# Patient Record
Sex: FEMALE | Race: WHITE | Employment: OTHER | URBAN - METROPOLITAN AREA
[De-identification: names, ages, dates, MRNs, and addresses within clinical notes are randomized per-mention and may not be internally consistent; named-entity substitution may affect disease eponyms.]

---

## 2018-06-21 ENCOUNTER — HOSPITAL ENCOUNTER (INPATIENT)
Age: 83
LOS: 3 days | Discharge: NURSING, REHAB OR LONG-TERM CARE | DRG: 556 | End: 2018-06-26
Attending: EMERGENCY MEDICINE | Admitting: HOSPITALIST
Payer: MEDICARE

## 2018-06-21 ENCOUNTER — APPOINTMENT (OUTPATIENT)
Dept: CT IMAGING | Age: 83
DRG: 556 | End: 2018-06-21
Attending: EMERGENCY MEDICINE
Payer: MEDICARE

## 2018-06-21 ENCOUNTER — APPOINTMENT (OUTPATIENT)
Dept: GENERAL RADIOLOGY | Age: 83
DRG: 556 | End: 2018-06-21
Attending: FAMILY MEDICINE
Payer: MEDICARE

## 2018-06-21 ENCOUNTER — APPOINTMENT (OUTPATIENT)
Dept: GENERAL RADIOLOGY | Age: 83
DRG: 556 | End: 2018-06-21
Attending: EMERGENCY MEDICINE
Payer: MEDICARE

## 2018-06-21 DIAGNOSIS — T84.9XXA COMPLICATION OF INTERNAL LEFT HIP PROSTHESIS, UNSPECIFIED COMPLICATION, INITIAL ENCOUNTER (HCC): Primary | ICD-10-CM
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
## 2018-06-21 DIAGNOSIS — Z96.642 COMPLICATION OF INTERNAL LEFT HIP PROSTHESIS, UNSPECIFIED COMPLICATION, INITIAL ENCOUNTER (HCC): Primary | ICD-10-CM

## 2018-06-21 DIAGNOSIS — R42 DIZZINESS: ICD-10-CM

## 2018-06-21 LAB
ALBUMIN SERPL-MCNC: 3.7 G/DL (ref 3.5–5)
ALBUMIN/GLOB SERPL: 1.1 {RATIO} (ref 1.1–2.2)
ALP SERPL-CCNC: 71 U/L (ref 45–117)
ALT SERPL-CCNC: 27 U/L (ref 12–78)
ANION GAP SERPL CALC-SCNC: 8 MMOL/L (ref 5–15)
APPEARANCE UR: ABNORMAL
AST SERPL-CCNC: 18 U/L (ref 15–37)
ATRIAL RATE: 77 BPM
BACTERIA URNS QL MICRO: ABNORMAL /HPF
BASOPHILS # BLD: 0.1 K/UL (ref 0–0.1)
BASOPHILS NFR BLD: 1 % (ref 0–1)
BILIRUB SERPL-MCNC: 0.3 MG/DL (ref 0.2–1)
BILIRUB UR QL: NEGATIVE
BNP SERPL-MCNC: 594 PG/ML (ref 0–450)
BUN SERPL-MCNC: 28 MG/DL (ref 6–20)
BUN/CREAT SERPL: 35 (ref 12–20)
CALCIUM SERPL-MCNC: 8.9 MG/DL (ref 8.5–10.1)
CALCULATED P AXIS, ECG09: 69 DEGREES
CALCULATED R AXIS, ECG10: 22 DEGREES
CALCULATED T AXIS, ECG11: 85 DEGREES
CHLORIDE SERPL-SCNC: 105 MMOL/L (ref 97–108)
CK MB CFR SERPL CALC: 2.9 % (ref 0–2.5)
CK MB SERPL-MCNC: 2.4 NG/ML (ref 5–25)
CK SERPL-CCNC: 83 U/L (ref 26–192)
CO2 SERPL-SCNC: 28 MMOL/L (ref 21–32)
COLOR UR: ABNORMAL
CREAT SERPL-MCNC: 0.79 MG/DL (ref 0.55–1.02)
DIAGNOSIS, 93000: NORMAL
DIFFERENTIAL METHOD BLD: ABNORMAL
EOSINOPHIL # BLD: 0.2 K/UL (ref 0–0.4)
EOSINOPHIL NFR BLD: 3 % (ref 0–7)
EPITH CASTS URNS QL MICRO: ABNORMAL /LPF
ERYTHROCYTE [DISTWIDTH] IN BLOOD BY AUTOMATED COUNT: 13.6 % (ref 11.5–14.5)
GLOBULIN SER CALC-MCNC: 3.4 G/DL (ref 2–4)
GLUCOSE SERPL-MCNC: 95 MG/DL (ref 65–100)
GLUCOSE UR STRIP.AUTO-MCNC: NEGATIVE MG/DL
HCT VFR BLD AUTO: 41.9 % (ref 35–47)
HGB BLD-MCNC: 13.4 G/DL (ref 11.5–16)
HGB UR QL STRIP: NEGATIVE
IMM GRANULOCYTES # BLD: 0 K/UL (ref 0–0.04)
IMM GRANULOCYTES NFR BLD AUTO: 1 % (ref 0–0.5)
INR PPP: 1 (ref 0.9–1.1)
KETONES UR QL STRIP.AUTO: NEGATIVE MG/DL
LACTATE SERPL-SCNC: 0.7 MMOL/L (ref 0.4–2)
LEUKOCYTE ESTERASE UR QL STRIP.AUTO: ABNORMAL
LYMPHOCYTES # BLD: 2.3 K/UL (ref 0.8–3.5)
LYMPHOCYTES NFR BLD: 31 % (ref 12–49)
MCH RBC QN AUTO: 31.2 PG (ref 26–34)
MCHC RBC AUTO-ENTMCNC: 32 G/DL (ref 30–36.5)
MCV RBC AUTO: 97.7 FL (ref 80–99)
MONOCYTES # BLD: 0.8 K/UL (ref 0–1)
MONOCYTES NFR BLD: 11 % (ref 5–13)
NEUTS SEG # BLD: 4 K/UL (ref 1.8–8)
NEUTS SEG NFR BLD: 54 % (ref 32–75)
NITRITE UR QL STRIP.AUTO: POSITIVE
NRBC # BLD: 0 K/UL (ref 0–0.01)
NRBC BLD-RTO: 0 PER 100 WBC
P-R INTERVAL, ECG05: 142 MS
PH UR STRIP: 8 [PH] (ref 5–8)
PLATELET # BLD AUTO: 283 K/UL (ref 150–400)
PMV BLD AUTO: 8.8 FL (ref 8.9–12.9)
POTASSIUM SERPL-SCNC: 3.9 MMOL/L (ref 3.5–5.1)
PROT SERPL-MCNC: 7.1 G/DL (ref 6.4–8.2)
PROT UR STRIP-MCNC: NEGATIVE MG/DL
PROTHROMBIN TIME: 10.1 SEC (ref 9–11.1)
Q-T INTERVAL, ECG07: 398 MS
QRS DURATION, ECG06: 68 MS
QTC CALCULATION (BEZET), ECG08: 450 MS
RBC # BLD AUTO: 4.29 M/UL (ref 3.8–5.2)
RBC #/AREA URNS HPF: ABNORMAL /HPF (ref 0–5)
SODIUM SERPL-SCNC: 141 MMOL/L (ref 136–145)
SP GR UR REFRACTOMETRY: 1.01 (ref 1–1.03)
TROPONIN I SERPL-MCNC: <0.05 NG/ML
UR CULT HOLD, URHOLD: NORMAL
UROBILINOGEN UR QL STRIP.AUTO: 0.2 EU/DL (ref 0.2–1)
VENTRICULAR RATE, ECG03: 77 BPM
WBC # BLD AUTO: 7.4 K/UL (ref 3.6–11)
WBC URNS QL MICRO: ABNORMAL /HPF (ref 0–4)

## 2018-06-21 PROCEDURE — 87186 SC STD MICRODIL/AGAR DIL: CPT | Performed by: FAMILY MEDICINE

## 2018-06-21 PROCEDURE — 84484 ASSAY OF TROPONIN QUANT: CPT | Performed by: EMERGENCY MEDICINE

## 2018-06-21 PROCEDURE — 73700 CT LOWER EXTREMITY W/O DYE: CPT

## 2018-06-21 PROCEDURE — 80053 COMPREHEN METABOLIC PANEL: CPT | Performed by: EMERGENCY MEDICINE

## 2018-06-21 PROCEDURE — 74011250637 HC RX REV CODE- 250/637: Performed by: FAMILY MEDICINE

## 2018-06-21 PROCEDURE — 87077 CULTURE AEROBIC IDENTIFY: CPT | Performed by: FAMILY MEDICINE

## 2018-06-21 PROCEDURE — 82550 ASSAY OF CK (CPK): CPT | Performed by: EMERGENCY MEDICINE

## 2018-06-21 PROCEDURE — 99285 EMERGENCY DEPT VISIT HI MDM: CPT

## 2018-06-21 PROCEDURE — 87040 BLOOD CULTURE FOR BACTERIA: CPT | Performed by: EMERGENCY MEDICINE

## 2018-06-21 PROCEDURE — 70450 CT HEAD/BRAIN W/O DYE: CPT

## 2018-06-21 PROCEDURE — 83880 ASSAY OF NATRIURETIC PEPTIDE: CPT | Performed by: EMERGENCY MEDICINE

## 2018-06-21 PROCEDURE — 73590 X-RAY EXAM OF LOWER LEG: CPT

## 2018-06-21 PROCEDURE — 81001 URINALYSIS AUTO W/SCOPE: CPT | Performed by: EMERGENCY MEDICINE

## 2018-06-21 PROCEDURE — 99218 HC RM OBSERVATION: CPT

## 2018-06-21 PROCEDURE — 96374 THER/PROPH/DIAG INJ IV PUSH: CPT

## 2018-06-21 PROCEDURE — 36415 COLL VENOUS BLD VENIPUNCTURE: CPT | Performed by: EMERGENCY MEDICINE

## 2018-06-21 PROCEDURE — 74011250636 HC RX REV CODE- 250/636: Performed by: EMERGENCY MEDICINE

## 2018-06-21 PROCEDURE — 72125 CT NECK SPINE W/O DYE: CPT

## 2018-06-21 PROCEDURE — 85025 COMPLETE CBC W/AUTO DIFF WBC: CPT | Performed by: EMERGENCY MEDICINE

## 2018-06-21 PROCEDURE — 71045 X-RAY EXAM CHEST 1 VIEW: CPT

## 2018-06-21 PROCEDURE — 85610 PROTHROMBIN TIME: CPT | Performed by: EMERGENCY MEDICINE

## 2018-06-21 PROCEDURE — 94762 N-INVAS EAR/PLS OXIMTRY CONT: CPT

## 2018-06-21 PROCEDURE — 93005 ELECTROCARDIOGRAM TRACING: CPT

## 2018-06-21 PROCEDURE — 87086 URINE CULTURE/COLONY COUNT: CPT | Performed by: FAMILY MEDICINE

## 2018-06-21 PROCEDURE — 83605 ASSAY OF LACTIC ACID: CPT | Performed by: EMERGENCY MEDICINE

## 2018-06-21 PROCEDURE — 74011250636 HC RX REV CODE- 250/636: Performed by: FAMILY MEDICINE

## 2018-06-21 PROCEDURE — 74176 CT ABD & PELVIS W/O CONTRAST: CPT

## 2018-06-21 PROCEDURE — 73502 X-RAY EXAM HIP UNI 2-3 VIEWS: CPT

## 2018-06-21 RX ORDER — SODIUM CHLORIDE 9 MG/ML
75 INJECTION, SOLUTION INTRAVENOUS CONTINUOUS
Status: DISCONTINUED | OUTPATIENT
Start: 2018-06-21 | End: 2018-06-26 | Stop reason: HOSPADM

## 2018-06-21 RX ORDER — ESTRADIOL 0.1 MG/G
2 CREAM VAGINAL
Status: ON HOLD | COMMUNITY
End: 2022-03-09

## 2018-06-21 RX ORDER — ACETAMINOPHEN 500 MG
500 TABLET ORAL 2 TIMES DAILY
Status: ON HOLD | COMMUNITY
End: 2022-03-09

## 2018-06-21 RX ORDER — ACETAMINOPHEN 325 MG/1
650 TABLET ORAL
Status: ON HOLD | COMMUNITY
End: 2022-03-12 | Stop reason: SDUPTHER

## 2018-06-21 RX ORDER — SODIUM CHLORIDE 0.9 % (FLUSH) 0.9 %
5-10 SYRINGE (ML) INJECTION EVERY 8 HOURS
Status: DISCONTINUED | OUTPATIENT
Start: 2018-06-21 | End: 2018-06-26 | Stop reason: HOSPADM

## 2018-06-21 RX ORDER — ASPIRIN 81 MG/1
81 TABLET ORAL DAILY
Status: DISCONTINUED | OUTPATIENT
Start: 2018-06-22 | End: 2018-06-26 | Stop reason: HOSPADM

## 2018-06-21 RX ORDER — HYDROCORTISONE 1 %
CREAM (GRAM) TOPICAL
Status: ON HOLD | COMMUNITY
End: 2022-03-09

## 2018-06-21 RX ORDER — MENTHOL AND ZINC OXIDE .44; 20.625 G/100G; G/100G
OINTMENT TOPICAL 3 TIMES DAILY
COMMUNITY

## 2018-06-21 RX ORDER — ASPIRIN 81 MG/1
81 TABLET ORAL DAILY
COMMUNITY
End: 2021-04-08

## 2018-06-21 RX ORDER — ONDANSETRON 2 MG/ML
4 INJECTION INTRAMUSCULAR; INTRAVENOUS
Status: DISCONTINUED | OUTPATIENT
Start: 2018-06-21 | End: 2018-06-26 | Stop reason: HOSPADM

## 2018-06-21 RX ORDER — LACTULOSE 10 G/15ML
10 SOLUTION ORAL; RECTAL
Status: DISCONTINUED | OUTPATIENT
Start: 2018-06-21 | End: 2018-06-26 | Stop reason: HOSPADM

## 2018-06-21 RX ORDER — FENTANYL CITRATE 50 UG/ML
25 INJECTION, SOLUTION INTRAMUSCULAR; INTRAVENOUS
Status: DISCONTINUED | OUTPATIENT
Start: 2018-06-21 | End: 2018-06-26 | Stop reason: HOSPADM

## 2018-06-21 RX ORDER — FUROSEMIDE 20 MG/1
20 TABLET ORAL DAILY
COMMUNITY
End: 2018-06-26

## 2018-06-21 RX ORDER — SODIUM CHLORIDE 0.9 % (FLUSH) 0.9 %
5-10 SYRINGE (ML) INJECTION AS NEEDED
Status: DISCONTINUED | OUTPATIENT
Start: 2018-06-21 | End: 2018-06-26 | Stop reason: HOSPADM

## 2018-06-21 RX ORDER — FENTANYL CITRATE 50 UG/ML
50 INJECTION, SOLUTION INTRAMUSCULAR; INTRAVENOUS
Status: COMPLETED | OUTPATIENT
Start: 2018-06-21 | End: 2018-06-21

## 2018-06-21 RX ORDER — SERTRALINE HYDROCHLORIDE 50 MG/1
50 TABLET, FILM COATED ORAL DAILY
COMMUNITY

## 2018-06-21 RX ORDER — POLYETHYLENE GLYCOL 3350 17 G/17G
17 POWDER, FOR SOLUTION ORAL
Status: ON HOLD | COMMUNITY
End: 2022-03-09

## 2018-06-21 RX ORDER — SERTRALINE HYDROCHLORIDE 50 MG/1
50 TABLET, FILM COATED ORAL DAILY
Status: DISCONTINUED | OUTPATIENT
Start: 2018-06-22 | End: 2018-06-26 | Stop reason: HOSPADM

## 2018-06-21 RX ORDER — ACETAMINOPHEN 325 MG/1
650 TABLET ORAL
Status: DISCONTINUED | OUTPATIENT
Start: 2018-06-21 | End: 2018-06-26 | Stop reason: HOSPADM

## 2018-06-21 RX ORDER — LACTULOSE 10 G/15ML
10 SOLUTION ORAL; RECTAL
COMMUNITY

## 2018-06-21 RX ORDER — FENTANYL CITRATE 50 UG/ML
10 INJECTION, SOLUTION INTRAMUSCULAR; INTRAVENOUS ONCE
Status: COMPLETED | OUTPATIENT
Start: 2018-06-21 | End: 2018-06-21

## 2018-06-21 RX ORDER — POLYVINYL ALCOHOL 14 MG/ML
1 SOLUTION/ DROPS OPHTHALMIC
COMMUNITY

## 2018-06-21 RX ORDER — MELATONIN
1000 DAILY
COMMUNITY

## 2018-06-21 RX ORDER — POLYVINYL ALCOHOL 14 MG/ML
1 SOLUTION/ DROPS OPHTHALMIC
Status: DISCONTINUED | OUTPATIENT
Start: 2018-06-21 | End: 2018-06-26 | Stop reason: HOSPADM

## 2018-06-21 RX ADMIN — Medication 10 ML: at 17:24

## 2018-06-21 RX ADMIN — SODIUM CHLORIDE 75 ML/HR: 900 INJECTION, SOLUTION INTRAVENOUS at 14:57

## 2018-06-21 RX ADMIN — FENTANYL CITRATE 50 MCG: 50 INJECTION, SOLUTION INTRAMUSCULAR; INTRAVENOUS at 12:57

## 2018-06-21 RX ADMIN — FENTANYL CITRATE 25 MCG: 50 INJECTION, SOLUTION INTRAMUSCULAR; INTRAVENOUS at 19:08

## 2018-06-21 RX ADMIN — ACETAMINOPHEN 650 MG: 325 TABLET ORAL at 17:23

## 2018-06-21 RX ADMIN — FENTANYL CITRATE 25 MCG: 50 INJECTION, SOLUTION INTRAMUSCULAR; INTRAVENOUS at 23:41

## 2018-06-21 RX ADMIN — FENTANYL CITRATE 10 MCG: 50 INJECTION, SOLUTION INTRAMUSCULAR; INTRAVENOUS at 22:14

## 2018-06-21 NOTE — ED PROVIDER NOTES
HPI Comments: 80 y.o. female with past medical history significant for macular degeneration who presents from [facility] via EMS for evaluation s/p GLF. Pt states that she was standing, not holding onto her walker, when she became dizzy and fell. She states that her walker hit her forehead at that time and she was unable to sit up off the floor. She is now c/o L-sided lower back and hip pain as well as bilateral leg pain. Pt denies neck pain or HA. There are no other acute medical concerns at this time. Social hx: no tobacco use; no EtOH use    Note written by Alessandra Hargrove, as dictated by Chano Grover MD 10:14 AM    The history is provided by the patient. No  was used. Past Medical History:   Diagnosis Date    Macular degeneration        History reviewed. No pertinent surgical history. History reviewed. No pertinent family history. Social History     Social History    Marital status: N/A     Spouse name: N/A    Number of children: N/A    Years of education: N/A     Occupational History    Not on file. Social History Main Topics    Smoking status: Not on file    Smokeless tobacco: Not on file    Alcohol use Not on file    Drug use: Not on file    Sexual activity: Not on file     Other Topics Concern    Not on file     Social History Narrative         ALLERGIES: Review of patient's allergies indicates no known allergies. Review of Systems   Constitutional: Negative for chills and fever. Respiratory: Negative for shortness of breath. Cardiovascular: Negative for chest pain. Gastrointestinal: Negative for abdominal pain. Musculoskeletal: Positive for arthralgias (L-hip), back pain and myalgias (bilateral LE). All other systems reviewed and are negative.       Vitals:    06/21/18 1014   BP: 167/55   Pulse: 77   Resp: 22   Temp: 97.7 °F (36.5 °C)   SpO2: 98%   Weight: 49.4 kg (109 lb)   Height: 5' 2\" (1.575 m)            Physical Exam Constitutional: She is oriented to person, place, and time. She appears well-developed and well-nourished. NAD, AxOx4, speaking in complete sentences, GCS = 15     HENT:   Head: Normocephalic and atraumatic. Nose: Nose normal.   Mouth/Throat: Oropharynx is clear and moist. No oropharyngeal exudate. Cn intact    Skin intact   Eyes: Conjunctivae and EOM are normal. Pupils are equal, round, and reactive to light. Right eye exhibits no discharge. Left eye exhibits no discharge. No scleral icterus. Neck: Normal range of motion. Neck supple. No JVD present. No tracheal deviation present. Cardiovascular: Normal rate, regular rhythm, normal heart sounds and intact distal pulses. Exam reveals no gallop and no friction rub. No murmur heard. Pulmonary/Chest: Effort normal and breath sounds normal. No respiratory distress. She has no wheezes. She has no rales. She exhibits no tenderness. Abdominal: Soft. Bowel sounds are normal. There is no tenderness. There is no rebound and no guarding. nttp     Genitourinary: No vaginal discharge found. Genitourinary Comments: Pt denies urinary/ vaginal complaints   Musculoskeletal: Normal range of motion. She exhibits tenderness. She exhibits no edema or deformity. L hip - diffuse ttp/ no leg shortening/ decreased ROM 2ary to pain/ pt has distal motor/ CV/ Sensation grossly intact L foot; Pt had central/ paraspinal C/T/L spines, Upper/Lower ext long bones, Abdomen,  Pelvis, hands /feet and all joints palpated and tolerated well (except as above) ; Pt has motor/ CV / Sensation grossly intact to all extremities; Neurological: She is alert and oriented to person, place, and time. No cranial nerve deficit. She exhibits normal muscle tone. Coordination normal.   Skin: Skin is warm and dry. No rash noted. No erythema. No pallor. Psychiatric: She has a normal mood and affect. Her behavior is normal. Thought content normal.   Nursing note and vitals reviewed. [facility]      ED Course       Procedures     Chief Complaint   Patient presents with   Remberto Erickson       11:47 AM  The patients presenting problems have been discussed, and they are in agreement with the care plan formulated and outlined with them. I have encouraged them to ask questions as they arise throughout their visit. MEDICATIONS GIVEN:  Medications   fentaNYL citrate (PF) injection 50 mcg (not administered)       LABS REVIEWED:  Labs Reviewed   CBC WITH AUTOMATED DIFF - Abnormal; Notable for the following:        Result Value    MPV 8.8 (*)     IMMATURE GRANULOCYTES 1 (*)     All other components within normal limits   METABOLIC PANEL, COMPREHENSIVE - Abnormal; Notable for the following:     BUN 28 (*)     BUN/Creatinine ratio 35 (*)     All other components within normal limits   URINALYSIS W/MICROSCOPIC - Abnormal; Notable for the following:     Appearance CLOUDY (*)     Nitrites POSITIVE (*)     Leukocyte Esterase SMALL (*)     Bacteria 3+ (*)     All other components within normal limits   CK W/ CKMB & INDEX - Abnormal; Notable for the following:     CK-MB Index 2.9 (*)     All other components within normal limits   NT-PRO BNP - Abnormal; Notable for the following:     NT pro- (*)     All other components within normal limits   URINE CULTURE HOLD SAMPLE   CULTURE, BLOOD   TROPONIN I   LACTIC ACID   PROTHROMBIN TIME + INR   SAMPLES BEING HELD       RADIOLOGY RESULTS:  The following have been ordered and reviewed:  _____________________________________________________________________  _____________________________________________________________________    EKG interpretation:   Rhythm: normal sinus rhythm; and regular . Rate (approx.): 76; Axis: normal; P wave: normal; QRS interval: normal ; ST/T wave: normal; Negative acute significant segmental elevations/ no old    PROCEDURES:        CONSULTATIONS:       PROGRESS NOTES:      DIAGNOSIS:    1.  Complication of internal left hip prosthesis, unspecified complication, initial encounter ([facility] [address]    2. Fall, initial encounter    3. Dizziness        PLAN:  1- L hip pain/ TTP and decreased ROM; neg xray/ CT - will have pt evaluated by ortho/ admit for pain contriol/ MRI  2 Fall  3 dizziness - chronic;       ED COURSE: The patients hospital course has been uncomplicated. PROGRESS NOTE:  11:24 AM  No fracture seen on x-ray. Pt is still in pain, will order CT scan of LE, abd, pelvis, spine, and head. PROGRESS NOTE:  11:32 AM  Updated the pt's son on results and plan. 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 He reports that if she takes hands off walker she immediately becomes dizzy and falls over. CONSULT NOTE:  1:00 PM Johnny Vela MD communicated with Dr. Leo Rothman, Consult for Hospitalist via [facility] Text. Discussed available diagnostic tests and clinical findings. He will see and admit the pt.

## 2018-06-21 NOTE — PROGRESS NOTES
Problem: Falls - Risk of  Goal: *Absence of Falls  Document Cesar Fall Risk and appropriate interventions in the flowsheet. Outcome: Progressing Towards Goal  Fall Risk Interventions:            Medication Interventions: Bed/chair exit alarm, Patient to call before getting OOB, Teach patient to arise slowly    Elimination Interventions: Bed/chair exit alarm, Call light in reach, Toileting schedule/hourly rounds    History of Falls Interventions: Bed/chair exit alarm, Door open when patient unattended        Problem: Pressure Injury - Risk of  Goal: *Prevention of pressure injury  Document Franklyn Scale and appropriate interventions in the flowsheet.    Outcome: Progressing Towards Goal  Pressure Injury Interventions:       Moisture Interventions: Absorbent underpads, Apply protective barrier, creams and emollients, Internal/External urinary devices, Limit adult briefs, Minimize layers, Moisture barrier    Activity Interventions: Pressure redistribution bed/mattress(bed type), PT/OT evaluation    Mobility Interventions: Assess need for specialty bed, Pressure redistribution bed/mattress (bed type), PT/OT evaluation    Nutrition Interventions: Document food/fluid/supplement intake    Friction and Shear Interventions: Lift sheet, Lift team/patient mobility team, Minimize layers

## 2018-06-21 NOTE — H&P
[address] [facility]  HISTORY AND PHYSICAL      Josesito Antoine  MR#: [id number]  : 08/15/1925  ACCOUNT #: [de-identified]   ADMIT DATE: 2018    CHIEF COMPLAINT:  Fall. 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 The patient apparently had a fall today. She was standing, not holding onto her walker when she became dizzy and had a fall. The patient states that her walker hit her forehead and at that point of time she was unable to get up from the floor. The patient came to the ER complaining of severe left-sided lower back pain extending down to her legs. The patient was given some fentanyl in the ER, has some history of underlying dementia and is somewhat sleepy and lethargic right now and thus history was suboptimal.  History was primarily obtained from the son over the phone. I spoke to the patient's son, Jose Martin, who reports that about 10 years back patient was diagnosed with an intracranial mass. He said the diagnosis was made that it was either an astrocytoma or a meningioma. The surgeon went in but \"could not take all of it out and told him that this will grow slowly. \"  The son reports that since then, the patient has had chronic dizziness and every time she leaves the walker she falls and gets dizzy. The son reports that the patient has not had any other complaints or problems that he is aware of and is usually able to get to her basic ADLs. Reports that every time she does not use a walker it results in a fall. Son denies any other complaints or problems that she is aware of. Son reports that the patient is a DNR and he has a living will indicating the same.   No further history could be obtained from the patient or the son. PAST MEDICAL HISTORY:  See above. MEDICATIONS:  Currently the patient is on aspirin 81 mg every day, sertraline 50 mg every day, cholecalciferol, MiraLax 17 grams every day, furosemide 20 mg every day,  acetaminophen as needed, lactulose p.r.n. for constipation, furosemide 20 mg p.o. daily, Preparation-H and estradiol. SOCIAL HISTORY:  No history of tobacco abuse, alcohol use, or IV drug abuse per son. ALLERGIES:  HYDROMORPHONE AND MORPHINE. REVIEW OF SYSTEMS:  Could not be obtained from the patient due to her underlying dementia. FAMILY HISTORY:  Could not be obtained from the patient due to her underlying dementia. PHYSICAL EXAMINATION:  VITAL SIGNS:  Temperature 97.7, pulse 72, respiratory rate 27, blood pressure 148/40, pulse ox 100% on room air. GENERAL:  Alert x 2,  awake, pleasantly confused female, appears to be stated age, slightly sleepy and lethargic. HEENT:  Pupils equal, reactive to light. Dry mucous membrane. NECK:  Supple. No meningeal signs, full range of motion. CHEST:  Decreased breath sounds. HEART:  S1, S2 heard. ABDOMEN:  Soft, nontender, nondistended. Bowel sounds are physiologic. EXTREMITIES:  The patient has significant pain in the left lower extremity all over her lower extremity, significantly decreased range of motion at the hip and the knee joint. No obvious bruising was noted. No clubbing, no cyanosis, no edema. NEUROPSYCHIATRIC:  Very limited exam.  DTRs 1+/4. The patient is able to move all 4 but strength. Cranial nerves could not be tested. MUSCULOSKELETAL:  Significant tenderness all the way from the left paraspinal area down to the left hip and down to the left shin. SKIN:  Warm. LABORATORY DATA:  White count 7.4, hemoglobin 13.4, hematocrit 41.9, platelets 834. Urine shows no signs of infection.   Sodium 141, potassium 3.9, chloride 105, bicarbonate 28, anion gap 8, glucose 95, BUN 28, creatinine 0.79, calcium 8.9, bilirubin total 0.3, ALT 27, AST 18, alkaline phosphatase 71. Lactic acid 0.7, CK-MB 2.4, troponin less than 0.05. . CT of the abdomen and pelvis shows no acute abnormality. CT of the head shows no evidence of acute intracranial injury, mild white matter hypodensity likely due to chronic small vessel ischemic changes, previous right mastoidectomy, probable meningioma in the right petrous apex. CT of the lower extremity shows chronic deformity of left sacral ala, left superior pubic ramus and left inferior pubic ramus. No acute fracture or dislocation, status post left hip bipolar hemiarthroplasty and ORIF left femur. 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 No stenosis. X-ray of the chest shows no acute process. X-ray of the hip shows no acute fracture identified. ASSESSMENT AND PLAN:  1. Left hip pain, unclear etiology. The patient had a fall today. CT of the hip does not show any acute bony injury. MRI has been ordered. Ortho consult has been ordered. We will provide pain control with oral medications as well as IV fentanyl. We will provide PT, OT consult. We will put patient on fall precautions and continue to closely monitor. Await Ortho input and further intervention will be per hospital course. Continue to monitor. The patient will ambulate with assistance. Further intervention will be per hospital course. Continue to monitor and reassess as needed. 2.  Dehydration. The patient appears to be mildly dehydrated. We will continue the patient on IV hydration. We will continue to closely monitor. Repeat labs in the morning. 3.  Dizziness appears to be chronic. The patient will be on fall precautions and PT consult has been ordered. 4.  Abnormal head CT. The patient has history of meningioma which is chronic. I suspect that is the abnormality on the CT scan.   May consider getting neurosurgery consult in the morning. If symptoms persist or if any concerns continue to closely monitor. Reassess as needed. Neurovascular checks have been ordered. 5.  Gastrointestinal and deep venous thrombosis prophylaxis. The patient will be on sequential compressive devices.       Chanda Belcher MD MM/GUILLERMO  D: 06/21/2018 14:36     T: 06/21/2018 15:29  JOB #: [id number]

## 2018-06-21 NOTE — IP AVS SNAPSHOT
[address] 
[phone] Patient: Inna Genao MRN: [id number] :8/15/1925 About your hospitalization You were admitted on:  2018 You last received care in the:  [facility] 6S NEURO-SCI TELE You were discharged on:  2018 Why you were hospitalized Your primary diagnosis was:  Dizziness Your diagnoses also included:  Back Pain Follow-up Information Follow up With Details Comments Contact Info Ione Spatz, DO In 3 weeks As needed for continued left leg pain 100 [facility] [address] 
[phone] [facility]  Snf for rehab [address] [facility] [phone] 
[phone] Discharge Orders None A check tameka indicates which time of day the medication should be taken. My Medications START taking these medications Instructions Each Dose to Equal  
 Morning Noon Evening Bedtime  
 cyclobenzaprine 10 mg tablet Commonly known as:  FLEXERIL Your last dose was: Your next dose is: Take 0.5 Tabs by mouth two (2) times daily as needed for Muscle Spasm(s). 5 mg  
    
   
   
   
  
 ibuprofen 600 mg tablet Commonly known as:  MOTRIN Your last dose was: Your next dose is: Take 1 Tab by mouth every six (6) hours as needed. 600 mg  
    
   
   
   
  
 lidocaine 5 % Commonly known as:  Daryle Garibay Start taking on:  2018 Your last dose was: Your next dose is:    
   
   
 Apply patch to the affected area for 12 hours a day and remove for 12 hours a day. nitrofurantoin (macrocrystal-monohydrate) 100 mg capsule Commonly known as:  MACROBID Your last dose was: Your next dose is: Take 1 Cap by mouth every twelve (12) hours for 5 days.   
 100 mg  
    
   
   
   
  
  
 CONTINUE taking these medications Instructions Each Dose to Equal  
 Morning Noon Evening Bedtime * acetaminophen 500 mg tablet Commonly known as:  TYLENOL Your last dose was: Your next dose is: Take 500 mg by mouth two (2) times a day. 500 mg  
    
   
   
   
  
 * acetaminophen 325 mg tablet Commonly known as:  TYLENOL Your last dose was: Your next dose is: Take 650 mg by mouth every four (4) hours as needed for Pain. 650 mg  
    
   
   
   
  
 aspirin delayed-release 81 mg tablet Your last dose was: Your next dose is: Take 81 mg by mouth daily. 81 mg  
    
   
   
   
  
 cholecalciferol 1,000 unit tablet Commonly known as:  VITAMIN D3 Your last dose was: Your next dose is: Take 1,000 Units by mouth daily. 1000 Units ENULOSE 10 gram/15 mL solution Generic drug:  lactulose Your last dose was: Your next dose is: Take 10 g by mouth daily as needed. Indications: constipation 10 g ESTRACE 0.01 % (0.1 mg/gram) vaginal cream  
Generic drug:  estradiol Your last dose was: Your next dose is: Insert 2 g into vagina every Monday, Wednesday, Friday. UTI ppx 2 g Menthol-Zinc Oxide 0.44-20.6 % Oint Commonly known as:  Calmoseptine Your last dose was: Your next dose is:    
   
   
 Apply  to affected area two (2) times daily as needed (red buttocks/barrier cream). MIRALAX 17 gram packet Generic drug:  polyethylene glycol Your last dose was: Your next dose is: Take 17 g by mouth daily as needed. Indications: constipation 17 g  
    
   
   
   
  
 neomycin-bacitracnZn-polymyxnB 3.5-400-5,000 mg-unit-unit Oipk ointment Commonly known as:  NEOSPORIN Your last dose was: Your next dose is:    
   
   
 Apply 1 Packet to affected area as needed (in the event of skin tears or abrasions). 1 Packet  
    
   
   
   
  
 polyvinyl alcohol 1.4 % ophthalmic solution Commonly known as:  Greg Hull Your last dose was: Your next dose is:    
   
   
 Administer 1 Drop to both eyes every four (4) hours as needed. Indications: Dry Eye  
 1 Drop PREPARATION H HYDROCORTISONE 1 % topical cream  
Generic drug:  hydrocortisone Your last dose was: Your next dose is:    
   
   
 Apply  to affected area daily as needed for Skin Irritation or Itching. use thin layer  
     
   
   
   
  
 sertraline 50 mg tablet Commonly known as:  ZOLOFT Your last dose was: Your next dose is: Take 50 mg by mouth daily. 50 mg  
    
   
   
   
  
 * Notice: This list has 2 medication(s) that are the same as other medications prescribed for you. Read the directions carefully, and ask your doctor or other care provider to review them with you. STOP taking these medications LASIX 20 mg tablet Generic drug:  furosemide Where to Get Your Medications Information on where to get these meds will be given to you by the nurse or doctor. ! Ask your nurse or doctor about these medications  
  cyclobenzaprine 10 mg tablet  
 ibuprofen 600 mg tablet  
 lidocaine 5 %  
 nitrofurantoin (macrocrystal-monohydrate) 100 mg capsule Discharge Instructions Discharge Instructions PATIENT ID: Nii Argueta MRN: [id number] YOB: 1925 DATE OF ADMISSION: 6/21/2018 10:04 AM   
DATE OF DISCHARGE: 6/26/2018 PRIMARY CARE PROVIDER: None ATTENDING PHYSICIAN: Florentino Carballo MD 
DISCHARGING PROVIDER: Florentino Carballo MD   
To contact this individual call [phone] and ask the  to page. If unavailable ask to be transferred the Adult Hospitalist Department. DISCHARGE DIAGNOSES left back and flank pain due to fall CONSULTATIONS: IP CONSULT TO ORTHOPEDIC SURGERY 
IP CONSULT TO HOSPITALIST 
ED CONSULT TO SENIOR SERVICES CASE MANAGEMENT 
 
PROCEDURES/SURGERIES: * No surgery found * PENDING TEST RESULTS:  
At the time of discharge the following test results are still pending: n/a FOLLOW UP APPOINTMENTS:  
Follow-up Information Follow up With Details Comments Contact Lizzy Madison, DO In 3 weeks As needed for continued left leg pain [address] [facility] [address] 
[phone] [facility]  Snf for rehab [address] [facility] 7 [phone] 
[phone] ADDITIONAL CARE RECOMMENDATIONS:  
Pain control as needed, mobilize as much as possible with physical therapy. Complete the antibiotic treatment course of urinary tract infection. Furosemide has been stopped as the patient was dehydrated on presentation. The need for restarting this should be reassessed on follow-up. DIET: Regular Diet ACTIVITY: Activity as tolerated WOUND CARE: n/a 
 
EQUIPMENT needed: n/a DISCHARGE MEDICATIONS: 
 See Medication Reconciliation Form · It is important that you take the medication exactly as they are prescribed. · Keep your medication in the bottles provided by the pharmacist and keep a list of the medication names, dosages, and times to be taken in your wallet. · Do not take other medications without consulting your doctor. NOTIFY YOUR PHYSICIAN FOR ANY OF THE FOLLOWING:  
Fever over 101 degrees for 24 hours. Chest pain, shortness of breath, fever, chills, nausea, vomiting, diarrhea, change in mentation, falling, weakness, bleeding. Severe pain or pain not relieved by medications. Or, any other signs or symptoms that you may have questions about. DISPOSITION: 
  Home With: 
 OT  PT  HH  RN  
  
x SNF/Inpatient Rehab/LTAC Independent/assisted living Hospice Other: CDMP Checked:  
Yes x PROBLEM LIST Updated: 
Yes x Signed:  
Savannah Elizabeth MD 
6/26/2018 10:34 AM 
 
 
  
  
  
Snip.ly Announcement We are excited to announce that we are making your provider's discharge notes available to you in Snip.ly. You will see these notes when they are completed and signed by the physician that discharged you from your recent hospital stay. If you have any questions or concerns about any information you see in [employer] System[employer], please call the Health Information Department where you were seen or reach out to your Primary Care Provider for more information about your plan of care. Introducing [facility]! [facility] introduces Snip.ly patient portal. Now you can access parts of your medical record, email your doctor's office, and request medication refills online. 1. In your internet browser, go to [email or web address] 2. Click on the First Time User? Click Here link in the Sign In box. You will see the New Member Sign Up page. 3. Enter your Snip.ly Access Code exactly as it appears below. You will not need to use this code after youve completed the sign-up process. If you do not sign up before the expiration date, you must request a new code. · Snip.ly Access Code: [email or web address] Expires: 9/24/2018 10:53 AM 
 
4. Enter the last four digits of your Social Security Number (xxxx) and Date of Birth (mm/dd/yyyy) as indicated and click Submit. You will be taken to the next sign-up page. 5. Create a Snip.ly ID. This will be your Snip.ly login ID and cannot be changed, so think of one that is secure and easy to remember. 6. Create a Snip.ly password. You can change your password at any time. 7. Enter your Password Reset Question and Answer. This can be used at a later time if you forget your password. 8. Enter your e-mail address. You will receive e-mail notification when new information is available in [address] 9. Click Sign Up. You can now view and download portions of your medical record. 10. Click the Download Summary menu link to download a portable copy of your medical information. If you have questions, please visit the Frequently Asked Questions section of the [employer] website. Remember, [employer] is NOT to be used for urgent needs. For medical emergencies, dial 911. Now available from your iPhone and Android! Introducing Renato Senior As a [employer] patient, I wanted to make you aware of our electronic visit tool called Renato Senior. [employer] 24/7 allows you to connect within minutes with a medical provider 24 hours a day, seven days a week via a mobile device or tablet or logging into a secure website from your computer. You can access Renato Senior from anywhere in the [place]. A virtual visit might be right for you when you have a simple condition and feel like you just dont want to get out of bed, or cant get away from work for an appointment, when your regular [employer] provider is not available (evenings, weekends or holidays), or when youre out of town and need minor care. Electronic visits cost only $49 and if the [employer] 24/7 provider determines a prescription is needed to treat your condition, one can be electronically transmitted to a nearby pharmacy*. Please take a moment to enroll today if you have not already done so. The enrollment process is free and takes just a few minutes. To enroll, please download the [employer] 24/7 seun to your tablet or phone, or visit [email or web address] to enroll on your computer. And, as an [address] [place] [address] patient with a skyrockit account, the results of your visits will be scanned into your electronic medical record and your primary care provider will be able to view the scanned results.    
We urge you to continue to see your regular [employer] provider for your ongoing medical care. And while your primary care provider may not be the one available when you seek a Renato Senior virtual visit, the peace of mind you get from getting a real diagnosis real time can be priceless. For more information on Renato Senior, view our Frequently Asked Questions (FAQs) at [email or web address]. Sincerely, 
 
Brenton Farrell MD 
Chief Medical Officer Iliana Agudelo *:  certain medications cannot be prescribed via Renato Senior Unresulted tests-please follow up with your PCP on these results Procedure/Test Authorizing Provider CBC W/O LUANNE Cat MD  
 CBC W/O DIFF Viridiana Cat MD  
 CBC W/O DIFF Marla Godoy MD  
 CBC WITH AUTOMATED DIFF Nina Mejía MD  
 CK W/ CKMB & INDEX Nina Mejía MD  
 CT ABD PELV WO CONT Nina Mejía MD  
 CT HEAD WO CONT Nina Mejía MD  
 CT LOW EXT LT WO CONT Nina Mejía MD  
 CT SPINE Karen Carranza MD  
 CULTURE, BLOOD Nina Mejía MD  
 CULTURE, Perla Hairston MD  
 EKG, 12 LEAD, INITIAL Nina Mejía MD  
 EKG, 12 LEAD, INITIAL Nina Mejía MD  
 LACTIC ACID MD Yordan Yu MD Macky Pickup, MD  
 METABOLIC PANEL, Hayden Yee MD  
 METABOLIC PANEL, Hayden Yee MD  
 METABOLIC PANEL, Angelia Hankins MD  
 METABOLIC PANEL, Jaylyn Marks MD  
 NT-PRO BNP Nina Mejía MD  
 PROTHROMBIN TIME + INR Nina Mejía MD  
 SAMPLES BEING HELD Nina Mejía MD  
 TROPONIN I Nina Mejía MD  
 URINALYSIS W/MICROSCOPIC Nina Mejía MD  
 URINE CULTURE HOLD SAMPLE Nina Mejía MD  
 XR Ford Hyde MD  
 XR HIP LT W OR WO PELV 2-3 VWS Nina Mejía MD  
 XR TIB/FIB LT Marla Godoy MD  
  
Providers Seen During Your Hospitalization Provider Specialty Primary office phone Nina Mejía MD Emergency Medicine [phone] Latonia Villalta MD Hospitalist [phone] Isabel Malin MD Internal Medicine [phone] General Record, MD Hospitalist [phone] Your Primary Care Physician (PCP) Primary Care Physician Office Phone Office Fax NONE ** None ** ** None ** You are allergic to the following Allergen Reactions Hydromorphone Unknown (comments) Morphine-Naltrexone Unknown (comments) Recent Documentation Height Weight BMI Smoking Status 1.575 m 56.1 kg 22.63 kg/m2 Never Smoker Emergency Contacts Name Discharge Info Relation Home Work Mobile 1 [facility] CAREGIVER [3] Child [2] [phone] Patient Belongings The following personal items are in your possession at time of discharge: 
  Dental Appliances: None  Visual Aid: None      Home Medications: None   Jewelry: None  Clothing: At bedside    Other Valuables: None Please provide this summary of care documentation to your next provider. Signatures-by signing, you are acknowledging that this After Visit Summary has been reviewed with you and you have received a copy. Patient Signature:  ____________________________________________________________ Date:  ____________________________________________________________  
  
Amee Shields Provider Signature:  ____________________________________________________________ Date:  ____________________________________________________________

## 2018-06-21 NOTE — ED TRIAGE NOTES
Pt arrives from [facility] via EMS for a GLF and left hip pain. Pt stated that she was going to the bathroom and became dizzy and fell. Pt hit head on walker, but complains of left hip pain from \"staying on the floor for so long. \"     1450: Pt remains alert and oriented x4. Pt continues to have left hip pain during movement, left pedal pulses +2. VSS. 1549: TRANSFER - OUT REPORT:    Verbal report given to KENNY Hooper (name) on Zollie Soda  being transferred to NSTU (unit) for routine progression of care       Report consisted of patients Situation, Background, Assessment and   Recommendations(SBAR). Information from the following report(s) SBAR, Kardex, ED Summary, MAR, Recent Results and Cardiac Rhythm NSR was reviewed with the receiving nurse. Lines:   Peripheral IV 06/21/18 Left Antecubital (Active)   Site Assessment Clean, dry, & intact 6/21/2018 10:20 AM   Phlebitis Assessment 0 6/21/2018 10:20 AM   Infiltration Assessment 0 6/21/2018 10:20 AM   Dressing Status Clean, dry, & intact 6/21/2018 10:20 AM   Dressing Type Tape;Transparent 6/21/2018 10:20 AM   Hub Color/Line Status Pink;Capped;Flushed;Patent 6/21/2018 10:20 AM   Action Taken Blood drawn 6/21/2018 10:20 AM        Opportunity for questions and clarification was provided.       Patient transported with:  Hospital transport

## 2018-06-22 ENCOUNTER — APPOINTMENT (OUTPATIENT)
Dept: MRI IMAGING | Age: 83
DRG: 556 | End: 2018-06-22
Attending: EMERGENCY MEDICINE
Payer: MEDICARE

## 2018-06-22 LAB
ANION GAP SERPL CALC-SCNC: 10 MMOL/L (ref 5–15)
BUN SERPL-MCNC: 17 MG/DL (ref 6–20)
BUN/CREAT SERPL: 25 (ref 12–20)
CALCIUM SERPL-MCNC: 8.4 MG/DL (ref 8.5–10.1)
CHLORIDE SERPL-SCNC: 108 MMOL/L (ref 97–108)
CO2 SERPL-SCNC: 27 MMOL/L (ref 21–32)
CREAT SERPL-MCNC: 0.68 MG/DL (ref 0.55–1.02)
ERYTHROCYTE [DISTWIDTH] IN BLOOD BY AUTOMATED COUNT: 13.3 % (ref 11.5–14.5)
GLUCOSE SERPL-MCNC: 110 MG/DL (ref 65–100)
HCT VFR BLD AUTO: 46.4 % (ref 35–47)
HGB BLD-MCNC: 14.8 G/DL (ref 11.5–16)
MCH RBC QN AUTO: 30.8 PG (ref 26–34)
MCHC RBC AUTO-ENTMCNC: 31.9 G/DL (ref 30–36.5)
MCV RBC AUTO: 96.7 FL (ref 80–99)
NRBC # BLD: 0 K/UL (ref 0–0.01)
NRBC BLD-RTO: 0 PER 100 WBC
PLATELET # BLD AUTO: 297 K/UL (ref 150–400)
PMV BLD AUTO: 8.8 FL (ref 8.9–12.9)
POTASSIUM SERPL-SCNC: 3.6 MMOL/L (ref 3.5–5.1)
RBC # BLD AUTO: 4.8 M/UL (ref 3.8–5.2)
SODIUM SERPL-SCNC: 145 MMOL/L (ref 136–145)
WBC # BLD AUTO: 10.2 K/UL (ref 3.6–11)

## 2018-06-22 PROCEDURE — 36415 COLL VENOUS BLD VENIPUNCTURE: CPT | Performed by: FAMILY MEDICINE

## 2018-06-22 PROCEDURE — 74011250636 HC RX REV CODE- 250/636: Performed by: FAMILY MEDICINE

## 2018-06-22 PROCEDURE — 74011000258 HC RX REV CODE- 258: Performed by: HOSPITALIST

## 2018-06-22 PROCEDURE — 85027 COMPLETE CBC AUTOMATED: CPT | Performed by: FAMILY MEDICINE

## 2018-06-22 PROCEDURE — 99218 HC RM OBSERVATION: CPT

## 2018-06-22 PROCEDURE — G8979 MOBILITY GOAL STATUS: HCPCS

## 2018-06-22 PROCEDURE — 74011250636 HC RX REV CODE- 250/636: Performed by: HOSPITALIST

## 2018-06-22 PROCEDURE — 80048 BASIC METABOLIC PNL TOTAL CA: CPT | Performed by: FAMILY MEDICINE

## 2018-06-22 PROCEDURE — 74011250637 HC RX REV CODE- 250/637: Performed by: FAMILY MEDICINE

## 2018-06-22 PROCEDURE — G8978 MOBILITY CURRENT STATUS: HCPCS

## 2018-06-22 PROCEDURE — 97530 THERAPEUTIC ACTIVITIES: CPT

## 2018-06-22 PROCEDURE — 97161 PT EVAL LOW COMPLEX 20 MIN: CPT

## 2018-06-22 PROCEDURE — 74011000250 HC RX REV CODE- 250: Performed by: FAMILY MEDICINE

## 2018-06-22 PROCEDURE — 74011250637 HC RX REV CODE- 250/637: Performed by: HOSPITALIST

## 2018-06-22 RX ORDER — LIDOCAINE 50 MG/G
1 PATCH TOPICAL EVERY 24 HOURS
Status: DISCONTINUED | OUTPATIENT
Start: 2018-06-22 | End: 2018-06-26 | Stop reason: HOSPADM

## 2018-06-22 RX ORDER — CYCLOBENZAPRINE HCL 10 MG
10 TABLET ORAL
Status: DISCONTINUED | OUTPATIENT
Start: 2018-06-22 | End: 2018-06-25

## 2018-06-22 RX ORDER — ENOXAPARIN SODIUM 100 MG/ML
40 INJECTION SUBCUTANEOUS EVERY 24 HOURS
Status: DISCONTINUED | OUTPATIENT
Start: 2018-06-22 | End: 2018-06-26 | Stop reason: HOSPADM

## 2018-06-22 RX ADMIN — CYCLOBENZAPRINE HYDROCHLORIDE 10 MG: 10 TABLET, FILM COATED ORAL at 14:44

## 2018-06-22 RX ADMIN — Medication 10 ML: at 14:41

## 2018-06-22 RX ADMIN — POLYVINYL ALCOHOL 1 DROP: 14 SOLUTION/ DROPS OPHTHALMIC at 11:51

## 2018-06-22 RX ADMIN — IBUPROFEN 600 MG: 400 TABLET ORAL at 14:44

## 2018-06-22 RX ADMIN — Medication 10 ML: at 22:00

## 2018-06-22 RX ADMIN — ACETAMINOPHEN 650 MG: 325 TABLET ORAL at 20:30

## 2018-06-22 RX ADMIN — ASPIRIN 81 MG: 81 TABLET, COATED ORAL at 11:44

## 2018-06-22 RX ADMIN — SERTRALINE HYDROCHLORIDE 50 MG: 50 TABLET ORAL at 11:44

## 2018-06-22 RX ADMIN — ENOXAPARIN SODIUM 40 MG: 100 INJECTION SUBCUTANEOUS at 11:49

## 2018-06-22 RX ADMIN — ACETAMINOPHEN 650 MG: 325 TABLET ORAL at 11:44

## 2018-06-22 RX ADMIN — CEFTRIAXONE SODIUM 2 G: 2 INJECTION, POWDER, FOR SOLUTION INTRAMUSCULAR; INTRAVENOUS at 16:36

## 2018-06-22 NOTE — PROGRESS NOTES
Physical Therapy Note  6/22/18    Order acknowledged and chart reviewed. S: Received pt completely flat in bed, soundly sleeping - family present and able to provide PLOF information. Pt lives in [facility] apartment alone and was indep with BADLs and mobility using a RW. Staff assists with showers. Hx of 4+ falls recently. O:  Pt admitted with GLF and L hip pain - all imaging thus far negative for acute process. Pt unable to tolerate slightest movement at this time - including elevating HOB >5degrees before she begins to scream and cry out in pain. Further mobility attempt aborted at this time. Ortho consult pending and pt scheduled for hip MRI this AM.     A/P: Will continue to follow for PT mobility assessment, pt may need SNF level rehab pending further work up and improvements in pain levels.      Bee Calles, PT, DPT

## 2018-06-22 NOTE — PROGRESS NOTES
Problem: Falls - Risk of  Goal: *Absence of Falls  Document Cesar Fall Risk and appropriate interventions in the flowsheet. Outcome: Progressing Towards Goal  Fall Risk Interventions:            Medication Interventions: Assess postural VS orthostatic hypotension, Evaluate medications/consider consulting pharmacy, Patient to call before getting OOB, Teach patient to arise slowly, Utilize gait belt for transfers/ambulation    Elimination Interventions: Call light in reach, Patient to call for help with toileting needs, Toilet paper/wipes in reach, Toileting schedule/hourly rounds    History of Falls Interventions: Consult care management for discharge planning, Door open when patient unattended, Evaluate medications/consider consulting pharmacy, Investigate reason for fall, Room close to nurse's station, Utilize gait belt for transfer/ambulation        Problem: Pressure Injury - Risk of  Goal: *Prevention of pressure injury  Document Franklyn Scale and appropriate interventions in the flowsheet. Outcome: Progressing Towards Goal  Pressure Injury Interventions:       Moisture Interventions: Apply protective barrier, creams and emollients, Assess need for specialty bed, Check for incontinence Q2 hours and as needed, Internal/External urinary devices, Limit adult briefs, Maintain skin hydration (lotion/cream), Minimize layers, Moisture barrier, Offer toileting Q_hr    Activity Interventions: Assess need for specialty bed, Increase time out of bed, Pressure redistribution bed/mattress(bed type), PT/OT evaluation    Mobility Interventions: Assess need for specialty bed, Float heels, HOB 30 degrees or less, Pressure redistribution bed/mattress (bed type), PT/OT evaluation, Turn and reposition approx.  every two hours(pillow and wedges)    Nutrition Interventions: Document food/fluid/supplement intake, Discuss nutritional consult with provider, Offer support with meals,snacks and hydration    Friction and Shear Interventions: Apply protective barrier, creams and emollients, HOB 30 degrees or less, Lift sheet, Minimize layers

## 2018-06-22 NOTE — INTERDISCIPLINARY ROUNDS
IDR Summary          Patient: Haley Moncada MRN: [id number]    Age: 80 y.o. YOB: 1925 Room/Bed: [facility]   Admit Diagnosis: Dizziness  Principal Diagnosis: Dizziness   Triad: Attending: Archie Fountain   Care Manager: Isis Palm  RN: Shanel Lopez  PCP: None    Readmission: NO          Testing due for pt today?  YES    Discharge plan for the day: Pain Control       Discharge plan for the stay: Pain Control; PT eval    Discharge plan for the way: [facility] [place]    Signed:     Shanel Lopez  6/22/2018  2:21 PM

## 2018-06-22 NOTE — PROGRESS NOTES
Problem: Mobility Impaired (Adult and Pediatric)  Goal: *Acute Goals and Plan of Care (Insert Text)  Physical Therapy Goals  Initiated 6/22/2018  1. Patient will move from supine to sit and sit to supine  and scoot up and down in bed with modified independence within 7 day(s). 2.  Patient will transfer from bed to chair and chair to bed with modified independence using the least restrictive device within 7 day(s). 3.  Patient will perform sit to stand with modified independence within 7 day(s). 4.  Patient will ambulate with modified independence for 100 feet with the least restrictive device within 7 day(s). physical Therapy EVALUATION  Patient: Ashkan Palma (80 y.o. female)  Date: 6/22/2018  Primary Diagnosis: Dizziness        Precautions:   DNR, Fall    ASSESSMENT :  Based on the objective data described below, the patient presents with impaired functional mobility independence as compared to baseline level 2* c/o dizziness and severe L sided hip/lumbar pain s/p GLF at home. All imaging thus far has been negative for acute process. Cleared for WBAT by ortho. Prior to admission, pt's family reports that she lived alone in [facility] apartment and was mod I with BADLs and limited distance ambulation (to/from dining vallecillo and within apartment) using RW. Report 4+ falls recently. Received pt supine in bed on bed pan, agreeable to participation in session. Able to perform R LE AROM independently and without pain - requires assist for L LE flexion with marked pain noted. With much encouragement, increased time, and gentle coaxing, pt able to complete rolling L<>R with min A and use of bed rails. Attempted to initiate supine>sit once in sidelying position, however pt tearfully stating that she \"absolutely cannot stand the pain\" and adamantly declining further attempts -even with total A.  Provided education on potential benefits of increasing mobility to reduce pain/stiffness however stating \"maybe I can try again tomorrow, I just can't bear it\". At this time, given pt's limited mobility, high pain levels, limited assist/SUP within apartment, and hx of 4+ falls recently, anticipate that she would benefit from discharge to SNF/healthcare setting prior to returning to apartment alone. Will continue to follow for ongoing mobility assessment/progression as pain allows. Patient will benefit from skilled intervention to address the above impairments. Patients rehabilitation potential is considered to be Good  Factors which may influence rehabilitation potential include:   []         None noted  []         Mental ability/status  []         Medical condition  []         Home/family situation and support systems  []         Safety awareness  []         Pain tolerance/management  []         Other:      PLAN :  Recommendations and Planned Interventions:  [x]           Bed Mobility Training             [x]    Neuromuscular Re-Education  [x]           Transfer Training                   []    Orthotic/Prosthetic Training  [x]           Gait Training                         []    Modalities  [x]           Therapeutic Exercises           []    Edema Management/Control  [x]           Therapeutic Activities            [x]    Patient and Family Training/Education  []           Other (comment):    Frequency/Duration: Patient will be followed by physical therapy  5 times a week to address goals. Discharge Recommendations: Skilled Nursing Facility  Further Equipment Recommendations for Discharge: TBD     SUBJECTIVE:   Patient stated I just stand it please don't make me.     OBJECTIVE DATA SUMMARY:   HISTORY:    Past Medical History:   Diagnosis Date    Alzheimer disease     Depressive episode     Dry eye syndrome     Hyperlipidemia     Macular degeneration     Muscle spasm     Primary hypertension     Spinal stenosis    History reviewed. No pertinent surgical history.   Prior Level of Function/Home Situation: lives in [facility] alone; mod I with BADLs and mobility using RW for limited distances   Personal factors and/or comorbidities impacting plan of care:     Home Situation  Home Environment: Assisted living  One/Two Story Residence: One story  Living Alone: Yes  Support Systems: Assisted living, Family member(s)  Patient Expects to be Discharged to[de-identified] Unknown  Current DME Used/Available at Home: Walker, rolling    EXAMINATION/PRESENTATION/DECISION MAKING:   Critical Behavior:  Neurologic State: Alert, Confused, Drowsy  Orientation Level: Oriented to person, Oriented to place, Oriented to situation, Disoriented to time  Cognition: Follows commands     Hearing: Auditory  Auditory Impairment: Hard of hearing, bilateral  Skin:  frail  Edema: none noted  Range Of Motion:  AROM: Generally decreased, functional  PROM: Generally decreased, functional      Strength:    Strength: Generally decreased, functional    Functional Mobility:  Bed Mobility:  Rolling: Minimum assistance; Additional time  Supine to Sit:  (unable to tolerate)    Balance:   Sitting:  (unable to tolerate)    Functional Measure:  Tinetti test:    Sitting Balance: 0  Arises: 0  Attempts to Rise: 0  Immediate Standing Balance: 0  Standing Balance: 0  Nudged: 0  Eyes Closed: 0  Turn 360 Degrees - Continuous/Discontinuous: 0  Turn 360 Degrees - Steady/Unsteady: 0  Sitting Down: 0  Balance Score: 0  Indication of Gait: 0  R Step Length/Height: 0  L Step Length/Height: 0  R Foot Clearance: 0  L Foot Clearance: 0  Step Symmetry: 0  Step Continuity: 0  Path: 0  Trunk: 0  Walking Time: 0  Gait Score: 0  Total Score: 0       Tinetti Test and G-code impairment scale:  Percentage of Impairment CH    0%   CI    1-19% CJ    20-39% CK    40-59% CL    60-79% CM    80-99% CN     100%   Tinetti  Score 0-28 28 23-27 17-22 12-16 6-11 1-5 0       Tinetti Tool Score Risk of Falls  <19 = High Fall Risk  19-24 = Moderate Fall Risk  25-28 = Low Fall Risk  Rubioetti ME.  Performance-Oriented Assessment of Mobility Problems in Elderly Patients. [facility] 66; [id number]. (Scoring Description: PT Bulletin Feb. 10, 1993)    Older adults: Dahlia Hashimoto et al, 2009; n = 1000 [facility] elderly evaluated with ABC, RENETTA, ADL, and IADL)  · Mean RENETTA score for males aged 69-68 years = 26.21(3.40)  · Mean RENETTA score for females age 69-68 years = 25.16(4.30)  · Mean RENETTA score for males over 80 years = 23.29(6.02)  · Mean RENETTA score for females over 80 years = 17.20(8.32)     G codes: In compliance with CMSs Claims Based Outcome Reporting, the following G-code set was chosen for this patient based on their primary functional limitation being treated: The outcome measure chosen to determine the severity of the functional limitation was the Tinetti with a score of 0/28 which was correlated with the impairment scale. ? Mobility - Walking and Moving Around:     - CURRENT STATUS: CN - 100% impaired, limited or restricted    - GOAL STATUS: CM - 80%-99% impaired, limited or restricted    - D/C STATUS:  ---------------To be determined---------------      Physical Therapy Evaluation Charge Determination   History Examination Presentation Decision-Making   HIGH Complexity :3+ comorbidities / personal factors will impact the outcome/ POC  MEDIUM Complexity : 3 Standardized tests and measures addressing body structure, function, activity limitation and / or participation in recreation  LOW Complexity : Stable, uncomplicated  HIGH Complexity : FOTO score of 1- 25       Based on the above components, the patient evaluation is determined to be of the following complexity level: LOW     Pain:  Pain Scale 1: Visual  Pain Intensity 1: 0  Pain Location 1: Back; Hip  Pain Orientation 1: Left  Pain Description 1: Aching; Sharp  Pain Intervention(s) 1: Medication (see MAR); Rest;Repositioned;Relaxation technique  Activity Tolerance:   VSS - limited by pain    Please refer to the flowsheet for vital signs taken during this treatment.   After treatment:   []         Patient left in no apparent distress sitting up in chair  [x]         Patient left in no apparent distress in bed  [x]         Call bell left within reach  [x]         Nursing notified  [x]         Caregiver present  []         Bed alarm activated    COMMUNICATION/EDUCATION:   The patients plan of care was discussed with: Registered Nurse. [x]         Fall prevention education was provided and the patient/caregiver indicated understanding. [x]         Patient/family have participated as able in goal setting and plan of care. [x]         Patient/family agree to work toward stated goals and plan of care. []         Patient understands intent and goals of therapy, but is neutral about his/her participation. []         Patient is unable to participate in goal setting and plan of care.     Thank you for this referral.  Maco Salguero, PT, DPT   Time Calculation: 22 mins

## 2018-06-22 NOTE — CONSULTS
ORTHOPAEDIC CONSULT NOTE    Subjective:     Date of Consultation:  June 22, 2018  Referring Physician:  Valeri Gray is a 80 y.o. female with PMH significant for HTN, spinal stenosis and dementia with distant history of left hip and distal femur fracture repairs (per patient done in [place]) is seen for left lower back and buttock pain. She states that she feel on Thurday while \"at work\" but on further questioning corrects herself and states that she does not work but was in [employer]. Patient complains of pain in her left posterior low back abd buttock region. She states that it makes her left leg feel very heavy and she is unable to move the leg on her own as a result. She describes the pain as a deep tightness without radiation. She denies groin, knee or ankle pain. She denies tingling or numbness in her leg. She states that she was able to walk with a walker prior to this episode. She further reports that she has recently been moved back here to be with her son after living with a daughter in [place]. No other family is present at this interview. Patient Active Problem List    Diagnosis Date Noted    Dizziness 06/21/2018     History reviewed. No pertinent family history. Social History   Substance Use Topics    Smoking status: Never Smoker    Smokeless tobacco: Never Used    Alcohol use No     Past Medical History:   Diagnosis Date    Alzheimer disease     Depressive episode     Dry eye syndrome     Hyperlipidemia     Macular degeneration     Muscle spasm     Primary hypertension     Spinal stenosis       History reviewed. No pertinent surgical history. Prior to Admission medications    Medication Sig Start Date End Date Taking? Authorizing Provider   aspirin delayed-release 81 mg tablet Take 81 mg by mouth daily. Yes Historical Provider   sertraline (ZOLOFT) 50 mg tablet Take 50 mg by mouth daily.    Yes Historical Provider   cholecalciferol (VITAMIN D3) 1,000 unit tablet Take 1,000 Units by mouth daily. Yes Historical Provider   polyethylene glycol (MIRALAX) 17 gram packet Take 17 g by mouth daily as needed. Indications: constipation   Yes Historical Provider   furosemide (LASIX) 20 mg tablet Take 20 mg by mouth daily. Yes Historical Provider   acetaminophen (TYLENOL) 500 mg tablet Take 500 mg by mouth two (2) times a day. Yes Historical Provider   Menthol-Zinc Oxide (CALMOSEPTINE) 0.44-20.6 % oint Apply  to affected area two (2) times daily as needed (red buttocks/barrier cream). Yes Historical Provider   acetaminophen (TYLENOL) 325 mg tablet Take 650 mg by mouth every four (4) hours as needed for Pain. Yes Historical Provider   lactulose (ENULOSE) 10 gram/15 mL solution Take 10 g by mouth daily as needed. Indications: constipation   Yes Historical Provider   polyvinyl alcohol (LIQUIFILM TEARS) 1.4 % ophthalmic solution Administer 1 Drop to both eyes every four (4) hours as needed. Indications: Dry Eye   Yes Historical Provider   neomycin-bacitracnZn-polymyxnB (NEOSPORIN) 3.5-400-5,000 mg-unit-unit oipk ointment Apply 1 Packet to affected area as needed (in the event of skin tears or abrasions). Yes Historical Provider   hydrocortisone (PREPARATION H HYDROCORTISONE) 1 % topical cream Apply  to affected area daily as needed for Skin Irritation or Itching. use thin layer   Yes Historical Provider   estradiol (ESTRACE) 0.01 % (0.1 mg/gram) vaginal cream Insert 2 g into vagina every Monday, Wednesday, Friday.  UTI ppx   Yes Historical Provider     Current Facility-Administered Medications   Medication Dose Route Frequency    enoxaparin (LOVENOX) injection 40 mg  40 mg SubCUTAneous Q24H    lidocaine (LIDODERM) 5 % patch 1 Patch  1 Patch TransDERmal Q24H    ibuprofen (MOTRIN) tablet 600 mg  600 mg Oral Q6H PRN    aspirin delayed-release tablet 81 mg  81 mg Oral DAILY    lactulose (CHRONULAC) 10 gram/15 mL solution 10 g  10 g Oral DAILY PRN    polyvinyl alcohol (LIQUIFILM TEARS) 1.4 % ophthalmic solution 1 Drop  1 Drop Both Eyes Q4H PRN    sertraline (ZOLOFT) tablet 50 mg  50 mg Oral DAILY    sodium chloride (NS) flush 5-10 mL  5-10 mL IntraVENous Q8H    sodium chloride (NS) flush 5-10 mL  5-10 mL IntraVENous PRN    0.9% sodium chloride infusion  75 mL/hr IntraVENous CONTINUOUS    acetaminophen (TYLENOL) tablet 650 mg  650 mg Oral Q4H PRN    ondansetron (ZOFRAN) injection 4 mg  4 mg IntraVENous Q4H PRN    fentaNYL citrate (PF) injection 25 mcg  25 mcg IntraVENous Q6H PRN      Allergies   Allergen Reactions    Hydromorphone Unknown (comments)    Morphine-Naltrexone Unknown (comments)        Review of Systems:  Review of systems not obtained due to patient factors. Mental Status: mild dementia    Objective:     Patient Vitals for the past 8 hrs:   BP Temp Pulse Resp SpO2   18 0653 173/56 97.9 °F (36.6 °C) 69 18 98 %     Temp (24hrs), Av.8 °F (36.6 °C), Min:97.5 °F (36.4 °C), Max:97.9 °F (36.6 °C)      EXAM: Asleep but arouses easily to voice; NAD but becomes somewhat anxious when talking about pain; agreeable to exam  Leg lengths equal  Left leg with multiple well healed surgical incisions without erythema, ecchymosis or effusion  TTP over the left lower back superior to the pelvis and into superior buttock region  Greater trochanter, knee and ankle are NTTP  Log roll of left leg produces no pain  Active flexion of left and knee hip increases low back pain and is weakened due to this  5/5 strength for ankle plantar and dorsiflexion  Distal sensory function grossly intact  Distal pulses palpable    Imaging Review: EXAM:  CT LOW EXT LT WO CONT     INDICATION:  pain/ neg xray/ hardware     COMPARISON: Radiographs 2018.     CONTRAST:  None.     TECHNIQUE:   Multislice helical CT was performed of the pelvis and both lower extremities to  the knees. No intravenous contrast administration. Oral contrast was not  administered.   Coronal and sagittal reformations were generated. CT dose  reduction was achieved through use of a standardized protocol tailored for this  examination and automatic exposure control for dose modulation.     FINDING:  Multiple diverticula are seen in the sigmoid colon. No evidence of  diverticulitis. The patient is status post hysterectomy. The bladder is  unremarkable. There is a small fat-containing umbilical hernia. There is a small  right fat-containing inguinal hernia.     The bones are osteopenic. There is moderate spondylosis in the lower lumbar  spine. The sacroiliac joints show mild degenerative change. There is buckling of  the anterior left sacral ala which appears chronic. There is chronic post right  deformity of the left inferior and superior pubic rami. The pubic symphysis is  intact. Moderate degenerative changes are present in the right hip. The patient  is status post left hip bipolar hemiarthroplasty. Heterotopic ossifications are  seen along the superior left hip joint capsule. The patient is status post ORIF  of the femur using a lateral sideplate and multiple screws. There is a healed  fracture of the mid distal left femur. No acute fracture or dislocation.     Scarring is seen posterior lateral to the left hip. Atherosclerotic  calcifications are present.     IMPRESSION  IMPRESSION:   1. Chronic deformity of the left sacral ala, left superior pubic ramus, and left  inferior pubic ramus. No acute fracture or dislocation. 2. Status post left hip bipolar hemiarthroplasty and ORIF of the left femur.     Labs:   Recent Results (from the past 24 hour(s))   CBC W/O DIFF    Collection Time: 06/22/18  5:05 AM   Result Value Ref Range    WBC 10.2 3.6 - 11.0 K/uL    RBC 4.80 3.80 - 5.20 M/uL    HGB 14.8 11.5 - 16.0 g/dL    HCT 46.4 35.0 - 47.0 %    MCV 96.7 80.0 - 99.0 FL    MCH 30.8 26.0 - 34.0 PG    MCHC 31.9 30.0 - 36.5 g/dL    RDW 13.3 11.5 - 14.5 %    PLATELET 488 099 - 747 K/uL    MPV 8.8 (L) 8.9 - 12.9 FL    NRBC 0.0 0  WBC ABSOLUTE NRBC 0.00 0.00 - 9.79 K/uL   METABOLIC PANEL, BASIC    Collection Time: 06/22/18  5:05 AM   Result Value Ref Range    Sodium 145 136 - 145 mmol/L    Potassium 3.6 3.5 - 5.1 mmol/L    Chloride 108 97 - 108 mmol/L    CO2 27 21 - 32 mmol/L    Anion gap 10 5 - 15 mmol/L    Glucose 110 (H) 65 - 100 mg/dL    BUN 17 6 - 20 MG/DL    Creatinine 0.68 0.55 - 1.02 MG/DL    BUN/Creatinine ratio 25 (H) 12 - 20      GFR est AA >60 >60 ml/min/1.73m2    GFR est non-AA >60 >60 ml/min/1.73m2    Calcium 8.4 (L) 8.5 - 10.1 MG/DL         Impression:     Principal Problem:    Dizziness (6/21/2018)        Plan:     Acute left lower back pain - left femur hardware from previous fractures unaffected and no new fractures visualized; she has some old left sided pubic ramus fractures with no evidence of acute changes;  Lumbar DDD - patient with known degeneration and fall likely has exacerbated her condition; no acute Orthopedic surgical needs at this time; needs symptomatic assistance with muscle relaxants, and anti-inflammatories (NSAIDS vs steroids) as well as mobilization with PT/OT  PT/OT - WBAT through bilateral legs with walker  MRI of hip not needed and would not be helpful due to amount of metal in leg - can be cancelled  If continues to have pain despite medications would consider CT of lumbar spine to evaluate for any significant foraminal stenosis but patient does not have significant radicular symptoms at this time  Medical management per primary team  Case Management for disposition planning  May follow-up with Orthopedics (Dr Dexter Willett) as needed going forward     Dr Dexter Willett aware of patient and agrees with current plan of care    Kiarra Hutchins PA-C  Orthopedic Trauma Service  [facility]

## 2018-06-22 NOTE — PROGRESS NOTES
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 The patient apparently had a fall today. She was standing, not holding onto her walker when she became dizzy and had a fall. The patient states that her walker hit her forehead and at that point of time she was unable to get up from the floor. The patient came to the ER complaining of severe left-sided lower back pain extending down to her legs. The patient was given some fentanyl in the ER, has some history of underlying dementia and is somewhat sleepy and lethargic right now and thus history was suboptimal.  History was primarily obtained from the son over the phone. I spoke to the patient's son, Jose Martin, who reports that about 10 years back patient was diagnosed with an intracranial mass. He said the diagnosis was made that it was either an astrocytoma or a meningioma. The surgeon went in but \"could not take all of it out and told him that this will grow slowly. \"  The son reports that since then, the patient has had chronic dizziness and every time she leaves the walker she falls and gets dizzy. The son reports that the patient has not had any other complaints or problems that he is aware of and is usually able to get to her basic ADLs. Reports that every time she does not use a walker it results in a fall. Son denies any other complaints or problems that she is aware of.   Son reports that the patient is a DNR and he has a living will indicating the same. No further history could be obtained from the patient or the son    Interval history / Subjective:    Rt. Flank and hip pain (f/u)     Assessment & Plan:     .  Left hip pain, unclear etiology started after the fall, most likely musculoskeleatl. CT of the hip does not show any acute bony injury. MRI has been ordered. We will provide pain control start lidoderm patch. We will provide PT, OT consult. We will put patient on fall precautions and continue to closely monitor. Await Ortho input. .   Dehydration. The patient appears to be mildly dehydrated. We will continue the patient on IV hydration. Improved. .   Dizziness appears to be chronic. The patient will be on fall precautions and PT consult has been ordered. 4.  Abnormal head CT. The patient has history of meningioma which is  chronic. Neurovascular checks have been ordered. Code status: Full  DVT prophylaxis: Lovenox    Care Plan discussed with: Patient/Family and Nurse  Disposition: Home w/Family and TBD     Hospital Problems  Date Reviewed: 6/21/2018          Codes Class Noted POA    * (Principal)Dizziness ICD-10-CM: R42  ICD-9-CM: 780.4  6/21/2018 Unknown                Review of Systems:   A comprehensive review of systems was negative except for that written in the HPI. Vital Signs:    Last 24hrs VS reviewed since prior progress note. Most recent are:  Visit Vitals    /56 (BP 1 Location: Right arm, BP Patient Position: At rest)    Pulse 69    Temp 97.9 °F (36.6 °C)    Resp 18    Ht 5' 2\" (1.575 m)    Wt 49.4 kg (109 lb)    SpO2 98%    BMI 19.94 kg/m2         Intake/Output Summary (Last 24 hours) at 06/22/18 0920  Last data filed at 06/22/18 0544   Gross per 24 hour   Intake              911 ml   Output              975 ml   Net              -64 ml        Physical Examination:             Constitutional:  No acute distress, cooperative, pleasant    ENT:  Oral mucous moist, oropharynx benign.  Neck supple,    Resp:  CTA bilaterally. No wheezing/rhonchi/rales. No accessory muscle use   CV:  Regular rhythm, normal rate, no murmurs, gallops, rubs    GI:  Soft, non distended, non tender. normoactive bowel sounds, no hepatosplenomegaly     Musculoskeletal:  No edema, warm, 2+ pulses throughout. Left hip and flank tenderness. Neurologic:  Moves all extremities. AAOx3, CN II-XII reviewed     Psych:  Good insight, Not anxious nor agitated. Skin:  Good turgor, no rashes or ulcers  Hematologic/Lymphatic/Immunlogic:  No jaundice nor lymph node swelling  Eyes:  EOMI. Anicteric sclerae, PERRL. Data Review:    I personally reviewed  Image and EKG/Monitor Tracing      Labs:     Recent Labs      06/22/18   0505  06/21/18   1021   WBC  10.2  7.4   HGB  14.8  13.4   HCT  46.4  41.9   PLT  297  283     Recent Labs      06/22/18   0505  06/21/18   1021   NA  145  141   K  3.6  3.9   CL  108  105   CO2  27  28   BUN  17  28*   CREA  0.68  0.79   GLU  110*  95   CA  8.4*  8.9     Recent Labs      06/21/18   1021   SGOT  18   ALT  27   AP  71   TBILI  0.3   TP  7.1   ALB  3.7   GLOB  3.4     Recent Labs      06/21/18   1021   INR  1.0   PTP  10.1      No results for input(s): FE, TIBC, PSAT, FERR in the last 72 hours. No results found for: FOL, RBCF   No results for input(s): PH, PCO2, PO2 in the last 72 hours.   Recent Labs      06/21/18   1021   CPK  83   CKNDX  2.9*   TROIQ  <0.05     No results found for: CHOL, CHOLX, CHLST, CHOLV, HDL, LDL, LDLC, DLDLP, TGLX, TRIGL, TRIGP, CHHD, CHHDX  No results found for: [facility]  Lab Results   Component Value Date/Time    Color YELLOW/STRAW 06/21/2018 10:35 AM    Appearance CLOUDY (A) 06/21/2018 10:35 AM    Specific gravity 1.012 06/21/2018 10:35 AM    pH (UA) 8.0 06/21/2018 10:35 AM    Protein NEGATIVE  06/21/2018 10:35 AM    Glucose NEGATIVE  06/21/2018 10:35 AM    Ketone NEGATIVE  06/21/2018 10:35 AM    Bilirubin NEGATIVE  06/21/2018 10:35 AM    Urobilinogen 0.2 06/21/2018 10:35 AM    Nitrites POSITIVE (A) 06/21/2018 10:35 AM    Leukocyte Esterase SMALL (A) 06/21/2018 10:35 AM    Epithelial cells FEW 06/21/2018 10:35 AM    Bacteria 3+ (A) 06/21/2018 10:35 AM    WBC 0-4 06/21/2018 10:35 AM    RBC 0-5 06/21/2018 10:35 AM         Medications Reviewed:     Current Facility-Administered Medications   Medication Dose Route Frequency    aspirin delayed-release tablet 81 mg  81 mg Oral DAILY    lactulose (CHRONULAC) 10 gram/15 mL solution 10 g  10 g Oral DAILY PRN    polyvinyl alcohol (LIQUIFILM TEARS) 1.4 % ophthalmic solution 1 Drop  1 Drop Both Eyes Q4H PRN    sertraline (ZOLOFT) tablet 50 mg  50 mg Oral DAILY    sodium chloride (NS) flush 5-10 mL  5-10 mL IntraVENous Q8H    sodium chloride (NS) flush 5-10 mL  5-10 mL IntraVENous PRN    0.9% sodium chloride infusion  75 mL/hr IntraVENous CONTINUOUS    acetaminophen (TYLENOL) tablet 650 mg  650 mg Oral Q4H PRN    ondansetron (ZOFRAN) injection 4 mg  4 mg IntraVENous Q4H PRN    fentaNYL citrate (PF) injection 25 mcg  25 mcg IntraVENous Q6H PRN     ______________________________________________________________________  EXPECTED LENGTH OF STAY: - - -  ACTUAL LENGTH OF STAY:          Jose Bryant MD

## 2018-06-22 NOTE — PROGRESS NOTES
Bedside and Verbal shift change report given to KENNY Blair  (oncoming nurse) by Rayo Long  (offgoing nurse). Report included the following information SBAR, Kardex, Intake/Output, MAR, Recent Results and Cardiac Rhythm NSR.

## 2018-06-22 NOTE — PROGRESS NOTES
Senior Services Consult    Reason for Admission:   Dizziness with ground level fall                   RRAT Score:          5/0/0           Plan for utilizing home health:      [address] used at [facility] Place of Readmission:  Low                         Transition of Care Plan: Brenna Camejo is a 80year old female to [facility] ED from [address] with complaints of dizziness and ground level fall. ED workup:  CT of Spine, Head, and Abdomen, CXR, urine, labs. Ortho Consult, Consulted Hospitalist.  Continued dizziness, son relates it to brain surgery years ago. She uses walker in the [facility]. Verified face sheet and demographics. Eron [phone]    NOK: Alvarado Santos [phone]    Payor:  Medicare A & B    DME:  Rolling walker, wheelchair    Care Management Interventions  PCP Verified by CM: Yes (Dr. Kimani Bautista)  Last Visit to PCP: 06/18/18  Palliative Care Criteria Met (RRAT>21 & CHF Dx)?: No  Transition of Care Consult (CM Consult): Discharge Planning, Other (Assisted Living/[facility] [phone])  MyChart Signup: No  Discharge Durable Medical Equipment: No Selestine Cargo at [facility])  Health Maintenance Reviewed: Yes  Physical Therapy Consult: Yes (PT consult pending )  Occupational Therapy Consult: No  Speech Therapy Consult: No  Current Support Network: Nursing Facility ([facility] - son visits often, has been at facility for approximately 1 month)  Plan discussed with Pt/Family/Caregiver: Yes (Met with patient, she was alert and oriented.  )    Care Management will continue to follow for discharge planning.     EMPERATRIZ Negron

## 2018-06-22 NOTE — INTERDISCIPLINARY ROUNDS
IDR/SLIDR Summary          Patient: Salinas Lee MRN: [id number]    Age: 80 y.o. YOB: 1925 Room/Bed: [facility]   Admit Diagnosis: Dizziness  Principal Diagnosis: Dizziness   Goals: Discharge Planning  Readmission: NO  Quality Measure: Not applicable  VTE Prophylaxis: Mechanical  Influenza Vaccine screening completed? YES  Pneumococcal Vaccine screening completed? NO  Mobility needs: Yes   Nutrition plan:Yes  Consults:P.T, O.T. and Case Management    Financial concerns:No  Escalated to CM? YES  RRAT Score: 8   Interventions:TBD; From [facility]  Testing due for pt today? YES  LOS: 2 days Expected length of stay 2-3 days  Discharge plan: TBD; From [facility]          PCP: None  Transportation needs: Yes    Days before discharge:two or more days before discharge   Discharge disposition: TBD;  From [facility]    Signed:     Mendel Hemp  6/22/2018  9:31 AM

## 2018-06-22 NOTE — PROGRESS NOTES
Bedside and Verbal shift change report given to Danny Honeycutt RN (oncoming nurse) by Jasson Peralta RN (offgoing nurse). Report included the following information SBAR, Kardex, Intake/Output, MAR, Recent Results and Cardiac Rhythm NSR.

## 2018-06-22 NOTE — PROGRESS NOTES
MRI staff called and informed RN that MRI is unable to be obtained due to the patient having metal clips.      MD notified; MRI cancelled

## 2018-06-22 NOTE — SENIOR SERVICES NOTE
Senior Services consult received, met with patient, son, and spoke with [facility] [phone].

## 2018-06-22 NOTE — PROGRESS NOTES
Chart reviewed for transitions of care, discussed patient during rounds. Patient is from assisted living at [facility], therapy is recommending SNF placement. Patient is currently under observation status so this will not be able to occur at this time unless patient qualifies to be upgraded to inpatient status. Cm will discuss with MD as able.   [employer] [place]

## 2018-06-23 PROBLEM — M54.9 BACK PAIN: Status: ACTIVE | Noted: 2018-06-23

## 2018-06-23 LAB
ANION GAP SERPL CALC-SCNC: 7 MMOL/L (ref 5–15)
BUN SERPL-MCNC: 19 MG/DL (ref 6–20)
BUN/CREAT SERPL: 29 (ref 12–20)
CALCIUM SERPL-MCNC: 8 MG/DL (ref 8.5–10.1)
CHLORIDE SERPL-SCNC: 111 MMOL/L (ref 97–108)
CO2 SERPL-SCNC: 26 MMOL/L (ref 21–32)
CREAT SERPL-MCNC: 0.65 MG/DL (ref 0.55–1.02)
ERYTHROCYTE [DISTWIDTH] IN BLOOD BY AUTOMATED COUNT: 13.3 % (ref 11.5–14.5)
GLUCOSE SERPL-MCNC: 84 MG/DL (ref 65–100)
HCT VFR BLD AUTO: 41.6 % (ref 35–47)
HGB BLD-MCNC: 13.3 G/DL (ref 11.5–16)
MAGNESIUM SERPL-MCNC: 2 MG/DL (ref 1.6–2.4)
MCH RBC QN AUTO: 30.6 PG (ref 26–34)
MCHC RBC AUTO-ENTMCNC: 32 G/DL (ref 30–36.5)
MCV RBC AUTO: 95.6 FL (ref 80–99)
NRBC # BLD: 0 K/UL (ref 0–0.01)
NRBC BLD-RTO: 0 PER 100 WBC
PLATELET # BLD AUTO: 271 K/UL (ref 150–400)
PMV BLD AUTO: 8.9 FL (ref 8.9–12.9)
POTASSIUM SERPL-SCNC: 3.4 MMOL/L (ref 3.5–5.1)
RBC # BLD AUTO: 4.35 M/UL (ref 3.8–5.2)
SODIUM SERPL-SCNC: 144 MMOL/L (ref 136–145)
WBC # BLD AUTO: 6.9 K/UL (ref 3.6–11)

## 2018-06-23 PROCEDURE — 99218 HC RM OBSERVATION: CPT

## 2018-06-23 PROCEDURE — 36415 COLL VENOUS BLD VENIPUNCTURE: CPT | Performed by: HOSPITALIST

## 2018-06-23 PROCEDURE — 80048 BASIC METABOLIC PNL TOTAL CA: CPT | Performed by: HOSPITALIST

## 2018-06-23 PROCEDURE — 65660000000 HC RM CCU STEPDOWN

## 2018-06-23 PROCEDURE — 83735 ASSAY OF MAGNESIUM: CPT | Performed by: HOSPITALIST

## 2018-06-23 PROCEDURE — 74011250637 HC RX REV CODE- 250/637: Performed by: HOSPITALIST

## 2018-06-23 PROCEDURE — 85027 COMPLETE CBC AUTOMATED: CPT | Performed by: HOSPITALIST

## 2018-06-23 PROCEDURE — 74011250636 HC RX REV CODE- 250/636: Performed by: HOSPITALIST

## 2018-06-23 PROCEDURE — 74011250636 HC RX REV CODE- 250/636: Performed by: FAMILY MEDICINE

## 2018-06-23 PROCEDURE — 74011250637 HC RX REV CODE- 250/637: Performed by: FAMILY MEDICINE

## 2018-06-23 PROCEDURE — 74011000258 HC RX REV CODE- 258: Performed by: HOSPITALIST

## 2018-06-23 RX ADMIN — Medication 10 ML: at 21:07

## 2018-06-23 RX ADMIN — CEFTRIAXONE SODIUM 2 G: 2 INJECTION, POWDER, FOR SOLUTION INTRAMUSCULAR; INTRAVENOUS at 15:37

## 2018-06-23 RX ADMIN — ACETAMINOPHEN 650 MG: 325 TABLET ORAL at 18:01

## 2018-06-23 RX ADMIN — IBUPROFEN 600 MG: 400 TABLET ORAL at 09:06

## 2018-06-23 RX ADMIN — CYCLOBENZAPRINE HYDROCHLORIDE 10 MG: 10 TABLET, FILM COATED ORAL at 18:01

## 2018-06-23 RX ADMIN — IBUPROFEN 600 MG: 400 TABLET ORAL at 21:07

## 2018-06-23 RX ADMIN — IBUPROFEN 600 MG: 400 TABLET ORAL at 03:34

## 2018-06-23 RX ADMIN — ENOXAPARIN SODIUM 40 MG: 100 INJECTION SUBCUTANEOUS at 11:36

## 2018-06-23 RX ADMIN — SODIUM CHLORIDE 75 ML/HR: 900 INJECTION, SOLUTION INTRAVENOUS at 03:34

## 2018-06-23 RX ADMIN — SERTRALINE HYDROCHLORIDE 50 MG: 50 TABLET ORAL at 08:36

## 2018-06-23 RX ADMIN — CYCLOBENZAPRINE HYDROCHLORIDE 10 MG: 10 TABLET, FILM COATED ORAL at 11:37

## 2018-06-23 RX ADMIN — Medication 5 ML: at 07:15

## 2018-06-23 RX ADMIN — ASPIRIN 81 MG: 81 TABLET, COATED ORAL at 08:36

## 2018-06-23 NOTE — PROGRESS NOTES
Bedside and Verbal shift change report given to KENNY Welch (oncoming nurse) by Mario Chandler (offgoing nurse). Report included the following information SBAR, Kardex, Intake/Output, MAR, Recent Results and Cardiac Rhythm NSR.

## 2018-06-23 NOTE — PROGRESS NOTES
Bedside and Verbal shift change report given to Kaylen Hardin RN (oncoming nurse) by SUHAIL Gomez RN (offgoing nurse). Report given with SBAR, Kardex, Intake/Output, MAR and Recent Results.

## 2018-06-23 NOTE — ROUTINE PROCESS
Bedside shift change report given to [facility] (oncoming nurse) by Andre Kasper (offgoing nurse). Report included the following information SBAR, Kardex, Recent Results and Cardiac Rhythm NSR.

## 2018-06-23 NOTE — INTERDISCIPLINARY ROUNDS
IDR/SLIDR Summary          Patient: Madeline Johnson MRN: [id number]    Age: 80 y.o. YOB: 1925 Room/Bed: [facility]/   Admit Diagnosis: Dizziness  Principal Diagnosis: Dizziness   Goals: safety, abx  Readmission: NO  Quality Measure: Not applicable  VTE Prophylaxis: Mechanical and Chemical  Influenza Vaccine screening completed? YES  Pneumococcal Vaccine screening completed? NO  Mobility needs: Yes   Nutrition plan:Yes  Consults:P.T, O.T. and Case Management    Financial concerns:No  Escalated to CM? YES  RRAT Score: 10   Interventions:  Testing due for pt today?  NO  LOS: 0 days Expected length of stay 0 days  Discharge plan: [facility]   PCP: None  Transportation needs: Yes    Days before discharge:two or more days before discharge   Discharge disposition: Nursing Home    Signed:     Yusuf Marcelo  6/23/2018  7:55 AM

## 2018-06-23 NOTE — PROGRESS NOTES
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 The patient apparently had a fall today. She was standing, not holding onto her walker when she became dizzy and had a fall. The patient states that her walker hit her forehead and at that point of time she was unable to get up from the floor. The patient came to the ER complaining of severe left-sided lower back pain extending down to her legs. The patient was given some fentanyl in the ER, has some history of underlying dementia and is somewhat sleepy and lethargic right now and thus history was suboptimal.  History was primarily obtained from the son over the phone. I spoke to the patient's son, Nataliya Rodas, who reports that about 10 years back patient was diagnosed with an intracranial mass. He said the diagnosis was made that it was either an astrocytoma or a meningioma. The surgeon went in but \"could not take all of it out and told him that this will grow slowly. \"  The son reports that since then, the patient has had chronic dizziness and every time she leaves the walker she falls and gets dizzy. The son reports that the patient has not had any other complaints or problems that he is aware of and is usually able to get to her basic ADLs. Reports that every time she does not use a walker it results in a fall. Son denies any other complaints or problems that she is aware of.   Son reports that the patient is a DNR and he has a living will indicating the same. No further history could be obtained from the patient or the son    Interval history / Subjective:    Rt. Flank and hip pain (f/u)     Assessment & Plan:     .  Leftt hip and flank intractable pain due to trauma. Patient is unable to ambulate due to pain. Seen in consultation with orthopaedic surgery. No MRI indicated. Started the patient on lidoderm patch. We will provide PT, OT consult. Kept the patient on fall precautions and continue to closely monitor. Await Ortho input. .   Dehydration. The patient appears to be mildly dehydrated. We will continue the patient on IV hydration. Improved. .   Dizziness appears to be chronic. The patient will be on fall precautions and PT consult has been ordered. 4.  Abnormal head CT. The patient has history of meningioma which is  chronic. Code status: Full  DVT prophylaxis: Lovenox    Care Plan discussed with: Patient/Family and Nurse  Disposition: Home w/Family and TBD     Hospital Problems  Date Reviewed: 6/21/2018          Codes Class Noted POA    * (Principal)Dizziness ICD-10-CM: R42  ICD-9-CM: 780.4  6/21/2018 Unknown                Review of Systems:   A comprehensive review of systems was negative except for that written in the HPI. Vital Signs:    Last 24hrs VS reviewed since prior progress note. Most recent are:  Visit Vitals    /52 (BP 1 Location: Right arm, BP Patient Position: At rest)    Pulse 70    Temp 97.8 °F (36.6 °C)    Resp 15    Ht 5' 2\" (1.575 m)    Wt 56 kg (123 lb 6.4 oz)    SpO2 96%    BMI 22.57 kg/m2         Intake/Output Summary (Last 24 hours) at 06/23/18 1052  Last data filed at 06/23/18 0400   Gross per 24 hour   Intake          2908.75 ml   Output              750 ml   Net          2158.75 ml        Physical Examination:             Constitutional:  No acute distress, cooperative, pleasant    ENT:  Oral mucous moist, oropharynx benign. Neck supple,    Resp:  CTA bilaterally.  No wheezing/rhonchi/rales. No accessory muscle use   CV:  Regular rhythm, normal rate, no murmurs, gallops, rubs    GI:  Soft, non distended, non tender. normoactive bowel sounds, no hepatosplenomegaly     Musculoskeletal:  No edema, warm, 2+ pulses throughout. Left hip and flank tenderness. Neurologic:  Moves all extremities. AAOx3, CN II-XII reviewed     Psych:  Good insight, Not anxious nor agitated. Skin:  Good turgor, no rashes or ulcers  Hematologic/Lymphatic/Immunlogic:  No jaundice nor lymph node swelling  Eyes:  EOMI. Anicteric sclerae, PERRL. Data Review:    I personally reviewed  Image and EKG/Monitor Tracing      Labs:     Recent Labs      06/23/18   0314  06/22/18   0505   WBC  6.9  10.2   HGB  13.3  14.8   HCT  41.6  46.4   PLT  271  297     Recent Labs      06/23/18   0314  06/22/18   0505  06/21/18   1021   NA  144  145  141   K  3.4*  3.6  3.9   CL  111*  108  105   CO2  26  27  28   BUN  19  17  28*   CREA  0.65  0.68  0.79   GLU  84  110*  95   CA  8.0*  8.4*  8.9   MG  2.0   --    --      Recent Labs      06/21/18   1021   SGOT  18   ALT  27   AP  71   TBILI  0.3   TP  7.1   ALB  3.7   GLOB  3.4     Recent Labs      06/21/18   1021   INR  1.0   PTP  10.1      No results for input(s): FE, TIBC, PSAT, FERR in the last 72 hours. No results found for: FOL, RBCF   No results for input(s): PH, PCO2, PO2 in the last 72 hours.   Recent Labs      06/21/18   1021   CPK  83   CKNDX  2.9*   TROIQ  <0.05     No results found for: CHOL, CHOLX, CHLST, CHOLV, HDL, LDL, LDLC, DLDLP, TGLX, TRIGL, TRIGP, CHHD, CHHDX  No results found for: [facility]  Lab Results   Component Value Date/Time    Color YELLOW/STRAW 06/21/2018 10:35 AM    Appearance CLOUDY (A) 06/21/2018 10:35 AM    Specific gravity 1.012 06/21/2018 10:35 AM    pH (UA) 8.0 06/21/2018 10:35 AM    Protein NEGATIVE  06/21/2018 10:35 AM    Glucose NEGATIVE  06/21/2018 10:35 AM    Ketone NEGATIVE  06/21/2018 10:35 AM    Bilirubin NEGATIVE  06/21/2018 10:35 AM    Urobilinogen 0.2 06/21/2018 10:35 AM    Nitrites POSITIVE (A) 06/21/2018 10:35 AM    Leukocyte Esterase SMALL (A) 06/21/2018 10:35 AM    Epithelial cells FEW 06/21/2018 10:35 AM    Bacteria 3+ (A) 06/21/2018 10:35 AM    WBC 0-4 06/21/2018 10:35 AM    RBC 0-5 06/21/2018 10:35 AM         Medications Reviewed:     Current Facility-Administered Medications   Medication Dose Route Frequency    enoxaparin (LOVENOX) injection 40 mg  40 mg SubCUTAneous Q24H    lidocaine (LIDODERM) 5 % patch 1 Patch  1 Patch TransDERmal Q24H    ibuprofen (MOTRIN) tablet 600 mg  600 mg Oral Q6H PRN    cyclobenzaprine (FLEXERIL) tablet 10 mg  10 mg Oral Q8H PRN    cefTRIAXone (ROCEPHIN) 2 g in 0.9% sodium chloride (MBP/ADV) 50 mL  2 g IntraVENous Q24H    aspirin delayed-release tablet 81 mg  81 mg Oral DAILY    lactulose (CHRONULAC) 10 gram/15 mL solution 10 g  10 g Oral DAILY PRN    polyvinyl alcohol (LIQUIFILM TEARS) 1.4 % ophthalmic solution 1 Drop  1 Drop Both Eyes Q4H PRN    sertraline (ZOLOFT) tablet 50 mg  50 mg Oral DAILY    sodium chloride (NS) flush 5-10 mL  5-10 mL IntraVENous Q8H    sodium chloride (NS) flush 5-10 mL  5-10 mL IntraVENous PRN    0.9% sodium chloride infusion  75 mL/hr IntraVENous CONTINUOUS    acetaminophen (TYLENOL) tablet 650 mg  650 mg Oral Q4H PRN    ondansetron (ZOFRAN) injection 4 mg  4 mg IntraVENous Q4H PRN    fentaNYL citrate (PF) injection 25 mcg  25 mcg IntraVENous Q6H PRN     ______________________________________________________________________  EXPECTED LENGTH OF STAY: - - -  ACTUAL LENGTH OF STAY:          Lisette Castellon MD

## 2018-06-23 NOTE — ROUTINE PROCESS
Bedside shift change report given to Caitlyn Mckee (oncoming nurse) by Cortney Rivera (offgoing nurse). Report included the following information SBAR, Kardex, Intake/Output, MAR, Accordion, Recent Results and Cardiac Rhythm NSR.

## 2018-06-24 LAB
BACTERIA SPEC CULT: ABNORMAL
BACTERIA SPEC CULT: ABNORMAL
CC UR VC: ABNORMAL
SERVICE CMNT-IMP: ABNORMAL

## 2018-06-24 PROCEDURE — 74011000258 HC RX REV CODE- 258: Performed by: HOSPITALIST

## 2018-06-24 PROCEDURE — 65660000000 HC RM CCU STEPDOWN

## 2018-06-24 PROCEDURE — 74011250637 HC RX REV CODE- 250/637: Performed by: FAMILY MEDICINE

## 2018-06-24 PROCEDURE — 74011250637 HC RX REV CODE- 250/637: Performed by: HOSPITALIST

## 2018-06-24 PROCEDURE — 74011250636 HC RX REV CODE- 250/636: Performed by: HOSPITALIST

## 2018-06-24 PROCEDURE — 74011250636 HC RX REV CODE- 250/636: Performed by: FAMILY MEDICINE

## 2018-06-24 RX ADMIN — SERTRALINE HYDROCHLORIDE 50 MG: 50 TABLET ORAL at 09:02

## 2018-06-24 RX ADMIN — Medication 10 ML: at 06:00

## 2018-06-24 RX ADMIN — IBUPROFEN 600 MG: 400 TABLET ORAL at 09:02

## 2018-06-24 RX ADMIN — CEFTRIAXONE SODIUM 2 G: 2 INJECTION, POWDER, FOR SOLUTION INTRAMUSCULAR; INTRAVENOUS at 16:28

## 2018-06-24 RX ADMIN — ENOXAPARIN SODIUM 40 MG: 100 INJECTION SUBCUTANEOUS at 11:05

## 2018-06-24 RX ADMIN — SODIUM CHLORIDE 75 ML/HR: 900 INJECTION, SOLUTION INTRAVENOUS at 16:29

## 2018-06-24 RX ADMIN — ASPIRIN 81 MG: 81 TABLET, COATED ORAL at 09:02

## 2018-06-24 RX ADMIN — Medication 10 ML: at 20:54

## 2018-06-24 RX ADMIN — CYCLOBENZAPRINE HYDROCHLORIDE 10 MG: 10 TABLET, FILM COATED ORAL at 09:02

## 2018-06-24 NOTE — PROGRESS NOTES
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 The patient apparently had a fall today. She was standing, not holding onto her walker when she became dizzy and had a fall. The patient states that her walker hit her forehead and at that point of time she was unable to get up from the floor. The patient came to the ER complaining of severe left-sided lower back pain extending down to her legs. The patient was given some fentanyl in the ER, has some history of underlying dementia and is somewhat sleepy and lethargic right now and thus history was suboptimal.  History was primarily obtained from the son over the phone. I spoke to the patient's son, Yolanda Sweeney, who reports that about 10 years back patient was diagnosed with an intracranial mass. He said the diagnosis was made that it was either an astrocytoma or a meningioma. The surgeon went in but \"could not take all of it out and told him that this will grow slowly. \"  The son reports that since then, the patient has had chronic dizziness and every time she leaves the walker she falls and gets dizzy. The son reports that the patient has not had any other complaints or problems that he is aware of and is usually able to get to her basic ADLs. Reports that every time she does not use a walker it results in a fall. Son denies any other complaints or problems that she is aware of.   Son reports that the patient is a DNR and he has a living will indicating the same. No further history could be obtained from the patient or the son    Interval history / Subjective:    Rt. Flank and hip pain (f/u)     Assessment & Plan:     .  Leftt hip and flank intractable pain due to trauma. Patient is unable to ambulate due to pain. Seen in consultation with orthopaedic surgery. No MRI indicated. Started the patient on lidoderm patch. PT, OT consult. Kept the patient on fall precautions and continue to closely monitor. Ambulate. Plan to dc snf. .   Dehydration. The patient appears to be mildly dehydrated. We will continue the patient on IV hydration. Improved. .   Dizziness appears to be chronic. The patient will be on fall precautions and PT consult has been ordered. 4.  Abnormal head CT. The patient has history of meningioma which is  chronic. Code status: Full  DVT prophylaxis: Lovenox    Care Plan discussed with: Patient/Family and Nurse  Disposition: Home w/Family and TBD     Hospital Problems  Date Reviewed: 6/21/2018          Codes Class Noted POA    Back pain ICD-10-CM: M54.9  ICD-9-CM: 724.5  6/23/2018 Unknown        * (Principal)Dizziness ICD-10-CM: R42  ICD-9-CM: 780.4  6/21/2018 Unknown                Review of Systems:   A comprehensive review of systems was negative except for that written in the HPI. Vital Signs:    Last 24hrs VS reviewed since prior progress note. Most recent are:  Visit Vitals    /43    Pulse 67    Temp 98 °F (36.7 °C)    Resp 16    Ht 5' 2\" (1.575 m)    Wt 56 kg (123 lb 6.4 oz)    SpO2 97%    BMI 22.57 kg/m2         Intake/Output Summary (Last 24 hours) at 06/24/18 0737  Last data filed at 06/24/18 3535   Gross per 24 hour   Intake             2085 ml   Output             1200 ml   Net              885 ml        Physical Examination:             Constitutional:  No acute distress, cooperative, pleasant    ENT:  Oral mucous moist, oropharynx benign. Neck supple,    Resp:  CTA bilaterally.  No wheezing/rhonchi/rales. No accessory muscle use   CV:  Regular rhythm, normal rate, no murmurs, gallops, rubs    GI:  Soft, non distended, non tender. normoactive bowel sounds, no hepatosplenomegaly     Musculoskeletal:  No edema, warm, 2+ pulses throughout. Left hip and flank tenderness. Neurologic:  Moves all extremities. AAOx3, CN II-XII reviewed     Psych:  Good insight, Not anxious nor agitated. Skin:  Good turgor, no rashes or ulcers  Hematologic/Lymphatic/Immunlogic:  No jaundice nor lymph node swelling  Eyes:  EOMI. Anicteric sclerae, PERRL. Data Review:    I personally reviewed  Image and EKG/Monitor Tracing      Labs:     Recent Labs      06/23/18   0314  06/22/18   0505   WBC  6.9  10.2   HGB  13.3  14.8   HCT  41.6  46.4   PLT  271  297     Recent Labs      06/23/18   0314  06/22/18   0505  06/21/18   1021   NA  144  145  141   K  3.4*  3.6  3.9   CL  111*  108  105   CO2  26  27  28   BUN  19  17  28*   CREA  0.65  0.68  0.79   GLU  84  110*  95   CA  8.0*  8.4*  8.9   MG  2.0   --    --      Recent Labs      06/21/18   1021   SGOT  18   ALT  27   AP  71   TBILI  0.3   TP  7.1   ALB  3.7   GLOB  3.4     Recent Labs      06/21/18   1021   INR  1.0   PTP  10.1      No results for input(s): FE, TIBC, PSAT, FERR in the last 72 hours. No results found for: FOL, RBCF   No results for input(s): PH, PCO2, PO2 in the last 72 hours.   Recent Labs      06/21/18   1021   CPK  83   CKNDX  2.9*   TROIQ  <0.05     No results found for: CHOL, CHOLX, CHLST, CHOLV, HDL, LDL, LDLC, DLDLP, TGLX, TRIGL, TRIGP, CHHD, CHHDX  No results found for: [facility]  Lab Results   Component Value Date/Time    Color YELLOW/STRAW 06/21/2018 10:35 AM    Appearance CLOUDY (A) 06/21/2018 10:35 AM    Specific gravity 1.012 06/21/2018 10:35 AM    pH (UA) 8.0 06/21/2018 10:35 AM    Protein NEGATIVE  06/21/2018 10:35 AM    Glucose NEGATIVE  06/21/2018 10:35 AM    Ketone NEGATIVE  06/21/2018 10:35 AM    Bilirubin NEGATIVE  06/21/2018 10:35 AM    Urobilinogen 0.2 06/21/2018 10:35 AM    Nitrites POSITIVE (A) 06/21/2018 10:35 AM    Leukocyte Esterase SMALL (A) 06/21/2018 10:35 AM    Epithelial cells FEW 06/21/2018 10:35 AM    Bacteria 3+ (A) 06/21/2018 10:35 AM    WBC 0-4 06/21/2018 10:35 AM    RBC 0-5 06/21/2018 10:35 AM         Medications Reviewed:     Current Facility-Administered Medications   Medication Dose Route Frequency    enoxaparin (LOVENOX) injection 40 mg  40 mg SubCUTAneous Q24H    lidocaine (LIDODERM) 5 % patch 1 Patch  1 Patch TransDERmal Q24H    ibuprofen (MOTRIN) tablet 600 mg  600 mg Oral Q6H PRN    cyclobenzaprine (FLEXERIL) tablet 10 mg  10 mg Oral Q8H PRN    cefTRIAXone (ROCEPHIN) 2 g in 0.9% sodium chloride (MBP/ADV) 50 mL  2 g IntraVENous Q24H    aspirin delayed-release tablet 81 mg  81 mg Oral DAILY    lactulose (CHRONULAC) 10 gram/15 mL solution 10 g  10 g Oral DAILY PRN    polyvinyl alcohol (LIQUIFILM TEARS) 1.4 % ophthalmic solution 1 Drop  1 Drop Both Eyes Q4H PRN    sertraline (ZOLOFT) tablet 50 mg  50 mg Oral DAILY    sodium chloride (NS) flush 5-10 mL  5-10 mL IntraVENous Q8H    sodium chloride (NS) flush 5-10 mL  5-10 mL IntraVENous PRN    0.9% sodium chloride infusion  75 mL/hr IntraVENous CONTINUOUS    acetaminophen (TYLENOL) tablet 650 mg  650 mg Oral Q4H PRN    ondansetron (ZOFRAN) injection 4 mg  4 mg IntraVENous Q4H PRN    fentaNYL citrate (PF) injection 25 mcg  25 mcg IntraVENous Q6H PRN     ______________________________________________________________________  EXPECTED LENGTH OF STAY: - - -  ACTUAL LENGTH OF STAY:          1                 Hilda Munson MD

## 2018-06-24 NOTE — PROGRESS NOTES
Problem: Falls - Risk of  Goal: *Absence of Falls  Document Cesar Fall Risk and appropriate interventions in the flowsheet.    Outcome: Progressing Towards Goal  Fall Risk Interventions:            Medication Interventions: Assess postural VS orthostatic hypotension, Patient to call before getting OOB, Teach patient to arise slowly    Elimination Interventions: Call light in reach, Patient to call for help with toileting needs, Toileting schedule/hourly rounds    History of Falls Interventions: Consult care management for discharge planning, Room close to nurse's station

## 2018-06-24 NOTE — INTERDISCIPLINARY ROUNDS
IDR/SLIDR Summary          Patient: Cyndie Gomez MRN: [id number]    Age: 80 y.o. YOB: 1925 Room/Bed: [facility]/   Admit Diagnosis: Dizziness  Back pain  Principal Diagnosis: Dizziness   Goals: safety, pain management  Readmission: NO  Quality Measure: Not applicable  VTE Prophylaxis: Chemical  Influenza Vaccine screening completed? YES  Pneumococcal Vaccine screening completed? NO  Mobility needs: Yes   Nutrition plan:Yes  Consults:P.T, O.T. and Case Management    Financial concerns:No  Escalated to CM? YES  RRAT Score: 10   Interventions:  Testing due for pt today?  NO  LOS: 1 days Expected length of stay 2 days  Discharge plan: tbd   PCP: None  Transportation needs: Yes    Days before discharge:two or more days before discharge   Discharge disposition: SNF    Signed:     Suellen Monk  6/24/2018  8:22 AM

## 2018-06-24 NOTE — PROGRESS NOTES
CM was informed that patient is inpatient as of Saturday June 23,18. Patient lives in [place] [id number] in the [facility]. Therapy evaluated patient and SNF being recommended. CM talked with patient and she was in agreement with [facility]- but asked CM to call her son, Alberto Cabral [phone]. Patient moved into [facility] end of April 18. She said she has been happy there. Patient has hx of dementia and ambulates with walker (see CM assessment 6/21/18)    CM talked with patient's son who is currently out of town with his wife about discharge plan for patient. He informed CM that patient came to [place] from [place], where she was living alone in a nursing home community,  in March 2018. She was placed at [facility] for rehab from March 28,18--April 18,18. From [facility] patient moved into [facility] in the [facility]. .  She has been very satisfied there. He is in agreement with [facility]  [phone]  for Snf-- Referral sent via CC link to the facility. Son said if [facility] cannot accept patient, [facility] is the second choice. CM will continue to follow for transition of care planning. Son will be back in [place] 6/29/18. He said he is available by cell at any time.

## 2018-06-25 PROCEDURE — 97530 THERAPEUTIC ACTIVITIES: CPT

## 2018-06-25 PROCEDURE — 74011250637 HC RX REV CODE- 250/637: Performed by: HOSPITALIST

## 2018-06-25 PROCEDURE — 74011250636 HC RX REV CODE- 250/636: Performed by: HOSPITALIST

## 2018-06-25 PROCEDURE — 65660000000 HC RM CCU STEPDOWN

## 2018-06-25 PROCEDURE — 74011250637 HC RX REV CODE- 250/637: Performed by: FAMILY MEDICINE

## 2018-06-25 RX ORDER — HALOPERIDOL 5 MG/ML
INJECTION INTRAMUSCULAR
Status: DISCONTINUED
Start: 2018-06-25 | End: 2018-06-25

## 2018-06-25 RX ORDER — HALOPERIDOL 5 MG/ML
1 INJECTION INTRAMUSCULAR ONCE
Status: COMPLETED | OUTPATIENT
Start: 2018-06-25 | End: 2018-06-25

## 2018-06-25 RX ORDER — NITROFURANTOIN 25; 75 MG/1; MG/1
100 CAPSULE ORAL EVERY 12 HOURS
Status: DISCONTINUED | OUTPATIENT
Start: 2018-06-25 | End: 2018-06-26 | Stop reason: HOSPADM

## 2018-06-25 RX ORDER — HALOPERIDOL 5 MG/ML
1 INJECTION INTRAMUSCULAR ONCE
Status: ACTIVE | OUTPATIENT
Start: 2018-06-25 | End: 2018-06-26

## 2018-06-25 RX ORDER — CYCLOBENZAPRINE HCL 10 MG
10 TABLET ORAL 3 TIMES DAILY
Status: DISCONTINUED | OUTPATIENT
Start: 2018-06-25 | End: 2018-06-26 | Stop reason: HOSPADM

## 2018-06-25 RX ADMIN — Medication 10 ML: at 22:00

## 2018-06-25 RX ADMIN — Medication 10 ML: at 05:34

## 2018-06-25 RX ADMIN — CYCLOBENZAPRINE HYDROCHLORIDE 10 MG: 10 TABLET, FILM COATED ORAL at 17:36

## 2018-06-25 RX ADMIN — HALOPERIDOL LACTATE 1 MG: 5 INJECTION, SOLUTION INTRAMUSCULAR at 17:41

## 2018-06-25 RX ADMIN — ENOXAPARIN SODIUM 40 MG: 100 INJECTION SUBCUTANEOUS at 09:07

## 2018-06-25 RX ADMIN — IBUPROFEN 600 MG: 400 TABLET ORAL at 05:34

## 2018-06-25 RX ADMIN — ASPIRIN 81 MG: 81 TABLET, COATED ORAL at 09:07

## 2018-06-25 RX ADMIN — CYCLOBENZAPRINE HYDROCHLORIDE 10 MG: 10 TABLET, FILM COATED ORAL at 05:34

## 2018-06-25 RX ADMIN — NITROFURANTOIN MONOHYDRATE AND NITROFURANTOIN MACROCRYSTALLINE 100 MG: 75; 25 CAPSULE ORAL at 21:00

## 2018-06-25 RX ADMIN — CYCLOBENZAPRINE HYDROCHLORIDE 10 MG: 10 TABLET, FILM COATED ORAL at 22:00

## 2018-06-25 RX ADMIN — NITROFURANTOIN MONOHYDRATE AND NITROFURANTOIN MACROCRYSTALLINE 100 MG: 75; 25 CAPSULE ORAL at 09:07

## 2018-06-25 RX ADMIN — SERTRALINE HYDROCHLORIDE 50 MG: 50 TABLET ORAL at 09:07

## 2018-06-25 NOTE — PROGRESS NOTES
Problem: Discharge Planning  Goal: *Discharge to safe environment  Outcome: Progressing Towards Goal  Discharge to skilled nursing for rehab

## 2018-06-25 NOTE — PROGRESS NOTES
Bedside and Verbal shift change report given to Betsy Grubbs RN (oncoming nurse) by Newton Brooks RN (offgoing nurse). Report included the following information SBAR, Kardex, Intake/Output, MAR and Recent Results.

## 2018-06-25 NOTE — PROGRESS NOTES
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 She was standing, not holding onto her walker when she became dizzy and had a fall. Her walker hit her forehead and at that point of time she was unable to get up from the floor. The patient came to the ER complaining of severe left-sided lower back pain extending down to her legs. The patient was given some fentanyl in the ER, has some history of underlying dementia and that made her sleepy and lethargic. Spoke to the patient's son, Mary He, who reported that about 10 years back patient was diagnosed with an intracranial mass. He said the diagnosis was made that it was either an astrocytoma or a meningioma. The surgeon went in but \"could not take all of it out and told him that this will grow slowly. \"  The son reported that since then, the patient has had chronic dizziness and every time she leaves the walker she falls and gets dizzy. She is usually able to get to her basic ADLs. Interval history / Subjective:    Left Flank and hip pain (f/u)     Assessment & Plan:     .  Leftt hip and flank intractable pain due to trauma. Patient is unable to ambulate due to pain. Seen in consultation with orthopaedic surgery. No MRI indicated. Started the patient on lidoderm patch, flexeril and ibuprofen, avoid narcotics. PT, OT consult. Kept the patient on fall precautions and continue to closely monitor. Ambulate. Plan to dc snf.   .   ESBL UTI, Dc rocephin and start macrobid for 5 days. .   Dehydration. Improved after getting IV hydration. .   Dizziness appears to be chronic. The patient will be on fall precautions and PT consulted. 4.  Abnormal head CT. The patient has history of meningioma which is  chronic. Code status: Full  DVT prophylaxis: Lovenox    Care Plan discussed with: Patient/Family and Nurse  Disposition: Home w/Family and TBD     Hospital Problems  Date Reviewed: 6/21/2018          Codes Class Noted POA    Back pain ICD-10-CM: M54.9  ICD-9-CM: 724.5  6/23/2018 Unknown        * (Principal)Dizziness ICD-10-CM: R42  ICD-9-CM: 780.4  6/21/2018 Unknown                Review of Systems:   A comprehensive review of systems was negative except for that written in the HPI. Vital Signs:    Last 24hrs VS reviewed since prior progress note. Most recent are:  Visit Vitals    /49 (BP 1 Location: Right arm, BP Patient Position: At rest)    Pulse 63    Temp 97.8 °F (36.6 °C)    Resp 16    Ht 5' 2\" (1.575 m)    Wt 56.7 kg (125 lb 1.6 oz)    SpO2 99%    BMI 22.88 kg/m2         Intake/Output Summary (Last 24 hours) at 06/25/18 0851  Last data filed at 06/25/18 0413   Gross per 24 hour   Intake          2111.25 ml   Output             1400 ml   Net           711.25 ml        Physical Examination:             Constitutional:  No acute distress, cooperative, pleasant    ENT:  Oral mucous moist, oropharynx benign. Neck supple,    Resp:  CTA bilaterally. No wheezing/rhonchi/rales. No accessory muscle use   CV:  Regular rhythm, normal rate, no murmurs, gallops, rubs    GI:  Soft, non distended, non tender. normoactive bowel sounds, no hepatosplenomegaly     Musculoskeletal:  No edema, warm, 2+ pulses throughout. Left hip and flank tenderness. Neurologic:  Moves all extremities. AAOx3, CN II-XII reviewed     Psych:  Good insight, Not anxious nor agitated.   Skin:  Good turgor, no rashes or ulcers  Hematologic/Lymphatic/Immunlogic:  No jaundice nor lymph node swelling  Eyes:  EOMI. Anicteric sclerae, PERRL. Data Review:         Labs:     Recent Labs      06/23/18   0314   WBC  6.9   HGB  13.3   HCT  41.6   PLT  271     Recent Labs      06/23/18 0314   NA  144   K  3.4*   CL  111*   CO2  26   BUN  19   CREA  0.65   GLU  84   CA  8.0*   MG  2.0     No results for input(s): SGOT, GPT, ALT, AP, TBIL, TBILI, TP, ALB, GLOB, GGT, AML, LPSE in the last 72 hours. No lab exists for component: AMYP, HLPSE  No results for input(s): INR, PTP, APTT in the last 72 hours. No lab exists for component: INREXT, INREXT   No results for input(s): FE, TIBC, PSAT, FERR in the last 72 hours. No results found for: FOL, RBCF   No results for input(s): PH, PCO2, PO2 in the last 72 hours. No results for input(s): CPK, CKNDX, TROIQ in the last 72 hours.     No lab exists for component: CPKMB  No results found for: CHOL, CHOLX, CHLST, CHOLV, HDL, LDL, LDLC, DLDLP, TGLX, TRIGL, TRIGP, CHHD, CHHDX  No results found for: [facility]  Lab Results   Component Value Date/Time    Color YELLOW/STRAW 06/21/2018 10:35 AM    Appearance CLOUDY (A) 06/21/2018 10:35 AM    Specific gravity 1.012 06/21/2018 10:35 AM    pH (UA) 8.0 06/21/2018 10:35 AM    Protein NEGATIVE  06/21/2018 10:35 AM    Glucose NEGATIVE  06/21/2018 10:35 AM    Ketone NEGATIVE  06/21/2018 10:35 AM    Bilirubin NEGATIVE  06/21/2018 10:35 AM    Urobilinogen 0.2 06/21/2018 10:35 AM    Nitrites POSITIVE (A) 06/21/2018 10:35 AM    Leukocyte Esterase SMALL (A) 06/21/2018 10:35 AM    Epithelial cells FEW 06/21/2018 10:35 AM    Bacteria 3+ (A) 06/21/2018 10:35 AM    WBC 0-4 06/21/2018 10:35 AM    RBC 0-5 06/21/2018 10:35 AM         Medications Reviewed:     Current Facility-Administered Medications   Medication Dose Route Frequency    nitrofurantoin (macrocrystal-monohydrate) (MACROBID) capsule 100 mg  100 mg Oral Q12H    enoxaparin (LOVENOX) injection 40 mg  40 mg SubCUTAneous Q24H    lidocaine (LIDODERM) 5 % patch 1 Patch 1 Patch TransDERmal Q24H    ibuprofen (MOTRIN) tablet 600 mg  600 mg Oral Q6H PRN    cyclobenzaprine (FLEXERIL) tablet 10 mg  10 mg Oral Q8H PRN    aspirin delayed-release tablet 81 mg  81 mg Oral DAILY    lactulose (CHRONULAC) 10 gram/15 mL solution 10 g  10 g Oral DAILY PRN    polyvinyl alcohol (LIQUIFILM TEARS) 1.4 % ophthalmic solution 1 Drop  1 Drop Both Eyes Q4H PRN    sertraline (ZOLOFT) tablet 50 mg  50 mg Oral DAILY    sodium chloride (NS) flush 5-10 mL  5-10 mL IntraVENous Q8H    sodium chloride (NS) flush 5-10 mL  5-10 mL IntraVENous PRN    0.9% sodium chloride infusion  75 mL/hr IntraVENous CONTINUOUS    acetaminophen (TYLENOL) tablet 650 mg  650 mg Oral Q4H PRN    ondansetron (ZOFRAN) injection 4 mg  4 mg IntraVENous Q4H PRN    fentaNYL citrate (PF) injection 25 mcg  25 mcg IntraVENous Q6H PRN     ______________________________________________________________________  EXPECTED LENGTH OF STAY: - - -  ACTUAL LENGTH OF STAY:          2                 Kenia Pillai MD

## 2018-06-25 NOTE — PROGRESS NOTES
Problem: Mobility Impaired (Adult and Pediatric)  Goal: *Acute Goals and Plan of Care (Insert Text)  Physical Therapy Goals  Initiated 6/22/2018  1. Patient will move from supine to sit and sit to supine  and scoot up and down in bed with modified independence within 7 day(s). 2.  Patient will transfer from bed to chair and chair to bed with modified independence using the least restrictive device within 7 day(s). 3.  Patient will perform sit to stand with modified independence within 7 day(s). 4.  Patient will ambulate with modified independence for 100 feet with the least restrictive device within 7 day(s). physical Therapy TREATMENT  Patient: Nory Mann (25 y.o. female)  Date: 6/25/2018  Diagnosis: Dizziness  Back pain Dizziness       Precautions: DNR, Fall  Chart, physical therapy assessment, plan of care and goals were reviewed. ASSESSMENT:  Cleared for mobility by RN. Received semi out of bed- resting assist to use the bathroom. With encouragement, pt agreeable to attempting OOB to [facility]. Continues to report high pain levels, however note marked improvement in ability to tolerate progressive mobilization. She was able to achieve sitting EOB with increased time and Min A - cues for sequencing and hand placement to increase indep with task. Completed sit<>stands x3 reps from various surfaces with mod A - cues to achieve full weight bearing through L LE and facilitation to maintain fwd weight shift of COG over MAT. Strong posterior lean in stance w/o physical assist. Able to take 2-3 small shuffled steps with B UE support of RW and constant mod/max A 2* quick fatigue, pain, and fear of falling. Remained up in chair at end of session, NAD. Continue to recommend discharge to SNF once medically cleared, will follow.     Progression toward goals:  []    Improving appropriately and progressing toward goals  [x]    Improving slowly and progressing toward goals  []    Not making progress toward goals and plan of care will be adjusted     PLAN:  Patient continues to benefit from skilled intervention to address the above impairments. Continue treatment per established plan of care. Discharge Recommendations:  Skilled Nursing Facility  Further Equipment Recommendations for Discharge:  TBD     SUBJECTIVE:   Patient stated Don't let me fall.     OBJECTIVE DATA SUMMARY:   Critical Behavior:  Neurologic State: Alert  Orientation Level: Oriented X4  Cognition: Follows commands     Functional Mobility Training:  Bed Mobility:     Supine to Sit: Additional time;Minimum assistance     Transfers:  Sit to Stand: Moderate assistance  Stand to Sit: Moderate assistance      Bed to Chair: Moderate assistance        Balance:  Sitting: Impaired; Without support  Sitting - Static: Fair (occasional)  Sitting - Dynamic: Poor (constant support)  Standing: With support; Impaired  Standing - Static: Poor;Constant support  Standing - Dynamic : Poor    Pain:  Pain Scale 1: Visual  Pain Intensity 1: 0  Pain Location 1: Leg  Pain Orientation 1: Left  Pain Description 1: Aching  Pain Intervention(s) 1: Medication (see MAR)  Activity Tolerance:   NAD    Please refer to the flowsheet for vital signs taken during this treatment.   After treatment:   [x]    Patient left in no apparent distress sitting up in chair  []    Patient left in no apparent distress in bed  [x]    Call bell left within reach  [x]    Nursing notified  []    Caregiver present  [x]    Chair alarm activated    COMMUNICATION/COLLABORATION:   The patients plan of care was discussed with: Registered Nurse    Jamal Ferrer PT , DPT   Time Calculation: 25 mins

## 2018-06-25 NOTE — PROGRESS NOTES
CM received call from [facility] in admissions informing CM that the [facility],  is reviewing medicals as there is a concern about the ESBL UTI. Cm talked with physician and was told that the infection is being treated orally and is not contagious. and this information was passed on to [facility]. CM waiting for final decision by the facility. UPDATE 12 noon   Patient accepted for SNF at [facility] [phone]. She can be admitted to Unit 1  Room 111 tomorrow. Patient will need ambulance transport-- Envelope and ambulance form on chart- CM will need to arrange transport with [facility]    The facility can secure medicals from [facility]. .     Nurse to call report to [phone]    CM will call patient's son, Chasity Beltrán-- [phone] when discharge time is determined. He is out of town this week and asked CM to call him.

## 2018-06-26 VITALS
SYSTOLIC BLOOD PRESSURE: 169 MMHG | HEIGHT: 62 IN | DIASTOLIC BLOOD PRESSURE: 50 MMHG | BODY MASS INDEX: 22.76 KG/M2 | TEMPERATURE: 97.8 F | OXYGEN SATURATION: 97 % | HEART RATE: 76 BPM | RESPIRATION RATE: 16 BRPM | WEIGHT: 123.7 LBS

## 2018-06-26 PROBLEM — R42 DIZZINESS: Status: RESOLVED | Noted: 2018-06-21 | Resolved: 2018-06-26

## 2018-06-26 LAB
ANION GAP SERPL CALC-SCNC: 10 MMOL/L (ref 5–15)
BACTERIA SPEC CULT: NORMAL
BUN SERPL-MCNC: 14 MG/DL (ref 6–20)
BUN/CREAT SERPL: 22 (ref 12–20)
CALCIUM SERPL-MCNC: 8.4 MG/DL (ref 8.5–10.1)
CHLORIDE SERPL-SCNC: 110 MMOL/L (ref 97–108)
CO2 SERPL-SCNC: 25 MMOL/L (ref 21–32)
CREAT SERPL-MCNC: 0.63 MG/DL (ref 0.55–1.02)
ERYTHROCYTE [DISTWIDTH] IN BLOOD BY AUTOMATED COUNT: 13.2 % (ref 11.5–14.5)
GLUCOSE SERPL-MCNC: 88 MG/DL (ref 65–100)
HCT VFR BLD AUTO: 41.1 % (ref 35–47)
HGB BLD-MCNC: 13.3 G/DL (ref 11.5–16)
MAGNESIUM SERPL-MCNC: 2.2 MG/DL (ref 1.6–2.4)
MCH RBC QN AUTO: 31.3 PG (ref 26–34)
MCHC RBC AUTO-ENTMCNC: 32.4 G/DL (ref 30–36.5)
MCV RBC AUTO: 96.7 FL (ref 80–99)
NRBC # BLD: 0 K/UL (ref 0–0.01)
NRBC BLD-RTO: 0 PER 100 WBC
PLATELET # BLD AUTO: 247 K/UL (ref 150–400)
PMV BLD AUTO: 8.7 FL (ref 8.9–12.9)
POTASSIUM SERPL-SCNC: 3.7 MMOL/L (ref 3.5–5.1)
RBC # BLD AUTO: 4.25 M/UL (ref 3.8–5.2)
SERVICE CMNT-IMP: NORMAL
SODIUM SERPL-SCNC: 145 MMOL/L (ref 136–145)
WBC # BLD AUTO: 6.6 K/UL (ref 3.6–11)

## 2018-06-26 PROCEDURE — 74011250636 HC RX REV CODE- 250/636: Performed by: FAMILY MEDICINE

## 2018-06-26 PROCEDURE — 74011250637 HC RX REV CODE- 250/637: Performed by: FAMILY MEDICINE

## 2018-06-26 PROCEDURE — 85027 COMPLETE CBC AUTOMATED: CPT | Performed by: HOSPITALIST

## 2018-06-26 PROCEDURE — 74011250636 HC RX REV CODE- 250/636: Performed by: HOSPITALIST

## 2018-06-26 PROCEDURE — 83735 ASSAY OF MAGNESIUM: CPT | Performed by: HOSPITALIST

## 2018-06-26 PROCEDURE — 74011250637 HC RX REV CODE- 250/637: Performed by: HOSPITALIST

## 2018-06-26 PROCEDURE — 36415 COLL VENOUS BLD VENIPUNCTURE: CPT | Performed by: HOSPITALIST

## 2018-06-26 PROCEDURE — 80048 BASIC METABOLIC PNL TOTAL CA: CPT | Performed by: HOSPITALIST

## 2018-06-26 RX ORDER — LIDOCAINE 50 MG/G
PATCH TOPICAL
Qty: 1 EACH | Refills: 0 | Status: ON HOLD
Start: 2018-06-27 | End: 2022-03-09

## 2018-06-26 RX ORDER — ACETAMINOPHEN 325 MG/1
650 TABLET ORAL
Qty: 30 TAB | Refills: 0 | Status: SHIPPED
Start: 2018-06-26 | End: 2018-06-26

## 2018-06-26 RX ORDER — CYCLOBENZAPRINE HCL 10 MG
5 TABLET ORAL
Qty: 20 TAB | Refills: 0 | Status: ON HOLD
Start: 2018-06-26 | End: 2022-03-09

## 2018-06-26 RX ORDER — IBUPROFEN 600 MG/1
600 TABLET ORAL
Qty: 20 TAB | Refills: 0 | Status: SHIPPED
Start: 2018-06-26 | End: 2021-04-08

## 2018-06-26 RX ORDER — NITROFURANTOIN 25; 75 MG/1; MG/1
100 CAPSULE ORAL EVERY 12 HOURS
Qty: 10 CAP | Refills: 0 | Status: SHIPPED
Start: 2018-06-26 | End: 2018-07-01

## 2018-06-26 RX ADMIN — ASPIRIN 81 MG: 81 TABLET, COATED ORAL at 09:10

## 2018-06-26 RX ADMIN — SERTRALINE HYDROCHLORIDE 50 MG: 50 TABLET ORAL at 09:09

## 2018-06-26 RX ADMIN — ENOXAPARIN SODIUM 40 MG: 100 INJECTION SUBCUTANEOUS at 09:09

## 2018-06-26 RX ADMIN — NITROFURANTOIN MONOHYDRATE AND NITROFURANTOIN MACROCRYSTALLINE 100 MG: 75; 25 CAPSULE ORAL at 09:09

## 2018-06-26 RX ADMIN — Medication 10 ML: at 06:33

## 2018-06-26 RX ADMIN — CYCLOBENZAPRINE HYDROCHLORIDE 10 MG: 10 TABLET, FILM COATED ORAL at 09:09

## 2018-06-26 RX ADMIN — SODIUM CHLORIDE 75 ML/HR: 900 INJECTION, SOLUTION INTRAVENOUS at 06:33

## 2018-06-26 NOTE — DISCHARGE SUMMARY
Discharge Summary     Patient: Christina Abreu MRN: [id number]  SSN: [id number]    YOB: 1925  Age: 80 y.o. Sex: female       Admit Date: 6/21/2018    Discharge Date: 6/26/2018      Admission Diagnoses: Dizziness  Back pain    Discharge Diagnoses:   Left hip and flank pain  Fall  UTI (POA)  Dehydration  Dizziness (chronic)  Head meningioma     Discharge Condition: Stable    Hospital Course: 80 y.o lady w/ a hx of macular degeneration, history of meningioma s/p craniotomy, chronic dizziness, depression, who presented with a fall. She developed left flank and hip pain. There were no fractures found. Ortho was consulted and did not recommend MRI. Pain control was started and the patient's symptoms improved though she is still in pain. She was found to have an ESBL e coli UTI and started on Macrobid. Furosemide was stopped because the patient was dehydrated. The need for diuretics should be reassessed on follow-up. Consults: Orthopedics    Significant Diagnostic Studies: see above    Xr Chest Sngl V    Result Date: 6/21/2018  IMPRESSION: No acute process is identified. Geno Kaba Hip Lt W Or Wo Pelv 2-3 Vws    Result Date: 6/21/2018  IMPRESSION:  No acute fracture identified. Geno Kaba Tib/fib Lt    Result Date: 6/21/2018  IMPRESSION: No fracture identified. .     Ct Head Wo Cont    Result Date: 6/21/2018  IMPRESSION:  1. No evidence of acute intracranial injury. 2. Mild white matter hypodensity likely due to chronic small vessel ischemic change. 3. Previous right mastoidectomy. 4. Probable meningioma in the right petrous apex. Ct Spine Cerv Wo Cont    Result Date: 6/21/2018  IMPRESSION: 1. No fracture or subluxation. 2. 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 3. Multilevel degenerative changes with mild-to-moderate central stenosis at C5-6. No stenosis elsewhere. Ct Low Ext Lt Wo Cont    Result Date: 6/21/2018  IMPRESSION: 1.  Chronic deformity of the left sacral ala, left superior pubic ramus, and left inferior pubic ramus. No acute fracture or dislocation. 2. Status post left hip bipolar hemiarthroplasty and ORIF of the left femur. Ct Abd Pelv Wo Cont    Result Date: 6/21/2018  IMPRESSION: No acute process in the abdomen and pelvis. Disposition: SNF    S: feels better today, pain is improved though still present, no dizziness, no n/v/d. Visit Vitals    /45 (BP 1 Location: Right arm, BP Patient Position: At rest)    Pulse 69    Temp 97.4 °F (36.3 °C)    Resp 17    Ht 5' 2\" (1.575 m)    Wt 56.1 kg (123 lb 11.2 oz)    SpO2 98%    BMI 22.63 kg/m2     NAD  Anicteric sclera  Heart RRR no MRG  Lungs CTAB normal WOB  Abd soft NT ND mild L flank tenderness      Discharge Medications:   Current Discharge Medication List      START taking these medications    Details   nitrofurantoin, macrocrystal-monohydrate, (MACROBID) 100 mg capsule Take 1 Cap by mouth every twelve (12) hours for 5 days. Qty: 10 Cap, Refills: 0      lidocaine (LIDODERM) 5 % Apply patch to the affected area for 12 hours a day and remove for 12 hours a day. Qty: 1 Each, Refills: 0      ibuprofen (MOTRIN) 600 mg tablet Take 1 Tab by mouth every six (6) hours as needed. Qty: 20 Tab, Refills: 0      cyclobenzaprine (FLEXERIL) 10 mg tablet Take 0.5 Tabs by mouth two (2) times daily as needed for Muscle Spasm(s). Qty: 20 Tab, Refills: 0         CONTINUE these medications which have NOT CHANGED    Details   aspirin delayed-release 81 mg tablet Take 81 mg by mouth daily. sertraline (ZOLOFT) 50 mg tablet Take 50 mg by mouth daily. cholecalciferol (VITAMIN D3) 1,000 unit tablet Take 1,000 Units by mouth daily. polyethylene glycol (MIRALAX) 17 gram packet Take 17 g by mouth daily as needed. Indications: constipation      !! acetaminophen (TYLENOL) 500 mg tablet Take 500 mg by mouth two (2) times a day.       Menthol-Zinc Oxide (CALMOSEPTINE) 0.44-20.6 % oint Apply  to affected area two (2) times daily as needed (red buttocks/barrier cream). !! acetaminophen (TYLENOL) 325 mg tablet Take 650 mg by mouth every four (4) hours as needed for Pain. lactulose (ENULOSE) 10 gram/15 mL solution Take 10 g by mouth daily as needed. Indications: constipation      polyvinyl alcohol (LIQUIFILM TEARS) 1.4 % ophthalmic solution Administer 1 Drop to both eyes every four (4) hours as needed. Indications: Dry Eye      neomycin-bacitracnZn-polymyxnB (NEOSPORIN) 3.5-400-5,000 mg-unit-unit oipk ointment Apply 1 Packet to affected area as needed (in the event of skin tears or abrasions). hydrocortisone (PREPARATION H HYDROCORTISONE) 1 % topical cream Apply  to affected area daily as needed for Skin Irritation or Itching. use thin layer      estradiol (ESTRACE) 0.01 % (0.1 mg/gram) vaginal cream Insert 2 g into vagina every Monday, Wednesday, Friday. UTI ppx       !! - Potential duplicate medications found. Please discuss with provider. STOP taking these medications       furosemide (LASIX) 20 mg tablet Comments:   Reason for Stopping: Follow-up Information     Follow up With Details Comments [address], DO In 3 weeks As needed for continued left leg pain [address]  [address] [place] [address] for rehab [address] [place] [address] Drive  [phone]            Signed By: Mary Thorpe MD     June 26, 2018      Greater than 30 minutes spent on discharge management.

## 2018-06-26 NOTE — DISCHARGE INSTRUCTIONS
Discharge Instructions       PATIENT ID: Inna Genao  MRN: [id number]   YOB: 1925    DATE OF ADMISSION: 6/21/2018 10:04 AM    DATE OF DISCHARGE: 6/26/2018    PRIMARY CARE PROVIDER: None     ATTENDING PHYSICIAN: General Record, MD  DISCHARGING PROVIDER: General Uziel MD    To contact this individual call [phone] and ask the  to page. If unavailable ask to be transferred the Adult Hospitalist Department. DISCHARGE DIAGNOSES left back and flank pain due to fall    CONSULTATIONS: IP CONSULT TO ORTHOPEDIC SURGERY  IP CONSULT TO HOSPITALIST  ED CONSULT TO SENIOR SERVICES CASE MANAGEMENT    PROCEDURES/SURGERIES: * No surgery found *    PENDING TEST RESULTS:   At the time of discharge the following test results are still pending: n/a    FOLLOW UP APPOINTMENTS:   Follow-up Information     Follow up With Details Comments [address], DO In 3 weeks As needed for continued left leg pain [address] [place] [address]  Snf for rehab [address]  [phone]             ADDITIONAL CARE RECOMMENDATIONS:   Pain control as needed, mobilize as much as possible with physical therapy. Complete the antibiotic treatment course of urinary tract infection. Furosemide has been stopped as the patient was dehydrated on presentation. The need for restarting this should be reassessed on follow-up. DIET: Regular Diet    ACTIVITY: Activity as tolerated    WOUND CARE: n/a    EQUIPMENT needed: n/a      DISCHARGE MEDICATIONS:   See Medication Reconciliation Form    · It is important that you take the medication exactly as they are prescribed. · Keep your medication in the bottles provided by the pharmacist and keep a list of the medication names, dosages, and times to be taken in your wallet. · Do not take other medications without consulting your doctor.        NOTIFY [address] OF THE FOLLOWING:   Fever over 101 degrees for 24 hours. Chest pain, shortness of breath, fever, chills, nausea, vomiting, diarrhea, change in mentation, falling, weakness, bleeding. Severe pain or pain not relieved by medications. Or, any other signs or symptoms that you may have questions about.       DISPOSITION:    Home With:   OT  PT  HH  RN      x SNF/Inpatient Rehab/LTAC    Independent/assisted living    Hospice    Other:     CDMP Checked:   Yes x     PROBLEM LIST Updated:  Yes x       Signed:   Chio Felton MD  6/26/2018  10:34 AM

## 2018-06-26 NOTE — INTERDISCIPLINARY ROUNDS
IDR/SLIDR Summary          Patient: Valentino Corporal MRN: [id number]    Age: 80 y.o. YOB: 1925 Room/Bed: [facility]   Admit Diagnosis: Dizziness  Back pain  Principal Diagnosis: Dizziness   Goals: ***  Readmission: {YES/NO:90932}  Quality Measure: {Quality Measures:50525}  VTE Prophylaxis: {VTE Prophylaxis:21798}  Influenza Vaccine screening completed? {YES/NO:38100}  Pneumococcal Vaccine screening completed? {YES/NO:57058}  Mobility needs: {Yes/No Caps:99852}   Nutrition plan:{Yes/No Caps:87576}  Consults:{Consult needed:21347}    Financial concerns:{Yes/No Caps:22126}  Escalated to CM? {YES/NO:94136}  RRAT Score: ***   Interventions:{Intervention:21348}  Testing due for pt today?  {YES/NO:88646}  LOS: 3 days Expected length of stay *** days  Discharge plan: ***   PCP: None  Transportation needs: {Yes/No Caps:22126}    Days before discharge:{Days before Discharge:21344}  Discharge disposition: {Discharge Destination:70777::\"Home\"}    Signed:     Fanny Mello RN  6/26/2018  1:34 AM

## 2018-06-26 NOTE — PROGRESS NOTES
Bedside and Verbal shift change report given to Kathryn Obrien (oncoming nurse) by Katie Herron (offgoing nurse). Report included the following information SBAR, Kardex and Recent Results.

## 2018-06-26 NOTE — PROGRESS NOTES
This pt is being discharged to [facility] today. RANDI spoke with Ajay Denis in admissions at [facility] and she can accept this pt today. RANDI set up ambulance transport with [facility] for 1pm today. RANDI also spoke with pt's son, Betty Henry, to inform him of d/c. He is in agreement with this plan. An envelope containing pt's kardex, MARs and AVS will be sent with pt to [facility]. Also, pt's nurse will call report.  Luis Armando Haynes

## 2018-07-10 ENCOUNTER — HOSPITAL ENCOUNTER (OUTPATIENT)
Dept: CT IMAGING | Age: 83
Discharge: HOME | End: 2018-07-10
Attending: INTERNAL MEDICINE
Payer: MEDICARE

## 2018-07-10 DIAGNOSIS — M54.50 LOW BACK ACHE: ICD-10-CM

## 2018-07-10 PROCEDURE — 72131 CT LUMBAR SPINE W/O DYE: CPT

## 2020-06-04 RX ORDER — ACETAMINOPHEN 325 MG/1
650 TABLET ORAL ONCE
Status: ACTIVE | OUTPATIENT
Start: 2020-06-05 | End: 2020-06-05

## 2020-06-04 RX ORDER — DIPHENHYDRAMINE HCL 25 MG
25 CAPSULE ORAL ONCE
Status: ACTIVE | OUTPATIENT
Start: 2020-06-05 | End: 2020-06-05

## 2020-06-05 ENCOUNTER — HOSPITAL ENCOUNTER (OUTPATIENT)
Dept: INFUSION THERAPY | Age: 85
Discharge: HOME | End: 2020-06-05
Payer: MEDICARE

## 2020-06-05 VITALS
HEART RATE: 77 BPM | DIASTOLIC BLOOD PRESSURE: 77 MMHG | TEMPERATURE: 97.8 F | SYSTOLIC BLOOD PRESSURE: 125 MMHG | RESPIRATION RATE: 18 BRPM | OXYGEN SATURATION: 97 %

## 2020-06-05 LAB
ABO + RH BLD: NORMAL
BLOOD GROUP ANTIBODIES SERPL: NORMAL
SPECIMEN EXP DATE BLD: NORMAL

## 2020-06-05 PROCEDURE — 86900 BLOOD TYPING SEROLOGIC ABO: CPT

## 2020-06-05 PROCEDURE — 36415 COLL VENOUS BLD VENIPUNCTURE: CPT

## 2020-06-05 RX ORDER — SODIUM CHLORIDE 0.9 % (FLUSH) 0.9 %
5-10 SYRINGE (ML) INJECTION AS NEEDED
Status: DISCONTINUED | OUTPATIENT
Start: 2020-06-05 | End: 2020-06-06 | Stop reason: HOSPADM

## 2020-06-05 RX ORDER — SODIUM CHLORIDE 9 MG/ML
250 INJECTION, SOLUTION INTRAVENOUS AS NEEDED
Status: DISPENSED | OUTPATIENT
Start: 2020-06-05 | End: 2020-06-05

## 2020-06-05 NOTE — PROGRESS NOTES
[facility] Lab Draw Note:    Arrived - 0815 for transfusion of 1 unit PRBCs    Able to draw type and crossmatch, however patient adamantly refuses blood transfusion. Involved family with patient discussion and patient continues to refuse. Contacted Melissa Rodriguez NP, and she agreed to discontinue transfusion at this time. Called transport for patient to go back to facility. Family also notified of plan. Assessment completed, patient has 3+ pitting edema bilateral lower extremities. No other complaints. Visit Vitals  /77   Pulse 77   Temp 97.8 °F (36.6 °C)   Resp 18   SpO2 97%       Labs drawn peripherally from left arm. Tolerated well. Pt denies any acute problems/changes. Discharged from [facility] via wheelchair with medical transportation. No distress.

## 2020-09-25 ENCOUNTER — APPOINTMENT (OUTPATIENT)
Dept: CT IMAGING | Age: 85
End: 2020-09-25
Attending: STUDENT IN AN ORGANIZED HEALTH CARE EDUCATION/TRAINING PROGRAM
Payer: MEDICARE

## 2020-09-25 ENCOUNTER — HOSPITAL ENCOUNTER (EMERGENCY)
Age: 85
Discharge: HOME | End: 2020-09-25
Attending: STUDENT IN AN ORGANIZED HEALTH CARE EDUCATION/TRAINING PROGRAM
Payer: MEDICARE

## 2020-09-25 VITALS
DIASTOLIC BLOOD PRESSURE: 42 MMHG | HEART RATE: 67 BPM | TEMPERATURE: 98 F | OXYGEN SATURATION: 100 % | RESPIRATION RATE: 16 BRPM | BODY MASS INDEX: 22.63 KG/M2 | HEIGHT: 62 IN | SYSTOLIC BLOOD PRESSURE: 148 MMHG

## 2020-09-25 DIAGNOSIS — S01.311A LACERATION OF RIGHT EAR, INITIAL ENCOUNTER: ICD-10-CM
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
## 2020-09-25 DIAGNOSIS — S09.90XA MINOR HEAD INJURY, INITIAL ENCOUNTER: ICD-10-CM

## 2020-09-25 PROCEDURE — 75810000293 HC SIMP/SUPERF WND  RPR

## 2020-09-25 PROCEDURE — 99284 EMERGENCY DEPT VISIT MOD MDM: CPT

## 2020-09-25 PROCEDURE — 90471 IMMUNIZATION ADMIN: CPT

## 2020-09-25 PROCEDURE — 90715 TDAP VACCINE 7 YRS/> IM: CPT | Performed by: STUDENT IN AN ORGANIZED HEALTH CARE EDUCATION/TRAINING PROGRAM

## 2020-09-25 PROCEDURE — 70450 CT HEAD/BRAIN W/O DYE: CPT

## 2020-09-25 PROCEDURE — 74011000250 HC RX REV CODE- 250: Performed by: STUDENT IN AN ORGANIZED HEALTH CARE EDUCATION/TRAINING PROGRAM

## 2020-09-25 PROCEDURE — 74011250636 HC RX REV CODE- 250/636: Performed by: STUDENT IN AN ORGANIZED HEALTH CARE EDUCATION/TRAINING PROGRAM

## 2020-09-25 PROCEDURE — 72125 CT NECK SPINE W/O DYE: CPT

## 2020-09-25 RX ORDER — BACITRACIN 500 [USP'U]/G
OINTMENT TOPICAL 3 TIMES DAILY
Qty: 1 TUBE | Refills: 0 | Status: SHIPPED | OUTPATIENT
Start: 2020-09-25 | End: 2020-10-05

## 2020-09-25 RX ORDER — AMOXICILLIN AND CLAVULANATE POTASSIUM 875; 125 MG/1; MG/1
1 TABLET, FILM COATED ORAL
Status: DISCONTINUED | OUTPATIENT
Start: 2020-09-25 | End: 2020-09-26 | Stop reason: HOSPADM

## 2020-09-25 RX ORDER — BACITRACIN 500 UNIT/G
1 PACKET (EA) TOPICAL
Status: DISCONTINUED | OUTPATIENT
Start: 2020-09-25 | End: 2020-09-26 | Stop reason: HOSPADM

## 2020-09-25 RX ORDER — AMOXICILLIN AND CLAVULANATE POTASSIUM 875; 125 MG/1; MG/1
1 TABLET, FILM COATED ORAL 2 TIMES DAILY
Qty: 14 TAB | Refills: 0 | Status: SHIPPED | OUTPATIENT
Start: 2020-09-25 | End: 2020-10-02

## 2020-09-25 RX ORDER — LIDOCAINE HYDROCHLORIDE 10 MG/ML
5 INJECTION INFILTRATION; PERINEURAL ONCE
Status: COMPLETED | OUTPATIENT
Start: 2020-09-25 | End: 2020-09-25

## 2020-09-25 RX ADMIN — LIDOCAINE HYDROCHLORIDE 5 ML: 10 INJECTION, SOLUTION INFILTRATION; PERINEURAL at 15:25

## 2020-09-25 RX ADMIN — TETANUS TOXOID, REDUCED DIPHTHERIA TOXOID AND ACELLULAR PERTUSSIS VACCINE, ADSORBED 0.5 ML: 5; 2.5; 8; 8; 2.5 SUSPENSION INTRAMUSCULAR at 15:25

## 2020-09-25 NOTE — ED TRIAGE NOTES
TRIAGE NOTE: Patient arrived from [facility] with c/o witnessed fall. No LOC. Laceration to the RIGHT ear. Bleeding controlled in triage.

## 2020-09-25 NOTE — DISCHARGE INSTRUCTIONS
Sutures should come out in 5 days    ENT follow up in 24-48 hours    PLEASE KEEP A CLOSE EYE ON YOUR WOUND(S). IF YOU NOTICE RED STREAKING, INCREASING DRAINAGE, WORSENING REDNESS, OR FEVER THEN PLEASE RETURN IMMEDIATELY. Seek immediate care for increased sleepiness, irritability, focal deficits (not moving an arm or leg, face looks different, numbness) and vomiting more than once.

## 2020-09-25 NOTE — ED PROVIDER NOTES
The patient is a 40-year-old female presenting today with a witnessed ground-level fall at the nursing home. Patient has dementia so is unable to provide history. She has no complaints other than the laceration to her right ear. She denies any pain. Unsure of last tetanus shot. Per record review unable to determine when last tetanus shot. .    Full history, physical exam, and ROS unable to be obtained due to:  Dementia    I spoke to patient's son over the phone and discussed plan including laceration repair with him. .             Past Medical History:   Diagnosis Date    Alzheimer disease ([facility] [address]     Depressive episode     Dry eye syndrome     Hyperlipidemia     Macular degeneration     Muscle spasm     Primary hypertension     Spinal stenosis        History reviewed. No pertinent surgical history. History reviewed. No pertinent family history.     Social History     Socioeconomic History    Marital status:      Spouse name: Not on file    Number of children: Not on file    Years of education: Not on file    Highest education level: Not on file   Occupational History    Not on file   Social Needs    Financial resource strain: Not on file    Food insecurity     Worry: Not on file     Inability: Not on file    Transportation needs     Medical: Not on file     Non-medical: Not on file   Tobacco Use    Smoking status: Never Smoker    Smokeless tobacco: Never Used   Substance and Sexual Activity    Alcohol use: No    Drug use: No    Sexual activity: Not on file   Lifestyle    Physical activity     Days per week: Not on file     Minutes per session: Not on file    Stress: Not on file   Relationships    Social connections     Talks on phone: Not on file     Gets together: Not on file     Attends Yazidi service: Not on file     Active member of club or organization: Not on file     Attends meetings of clubs or organizations: Not on file     Relationship status: Not on file   Brenda Alexandre Intimate partner violence     Fear of current or ex partner: Not on file     Emotionally abused: Not on file     Physically abused: Not on file     Forced sexual activity: Not on file   Other Topics Concern    Not on file   Social History Narrative    Not on file         ALLERGIES: Hydromorphone and Morphine-naltrexone    Review of Systems   Unable to perform ROS: Dementia       Vitals:    09/25/20 1502   BP: (!) 194/59   Pulse: 71   Resp: 18   Temp: 98 °F (36.7 °C)   SpO2: 99%   Height: 5' 2\" (1.575 m)            Physical Exam  Vitals signs and nursing note reviewed. Constitutional:       General: She is not in acute distress. Appearance: She is well-developed. She is not diaphoretic. HENT:      Head: Normocephalic. Comments: No cephalohematoma  No whatley sign or raccoon eyes  No hemotympanum  Midface is stable  No epistaxis/nasal septal hematoma  No dental malocclusion       Ears:      Comments: There is a laceration along the helix of her right ear which completely exposes the cartilage. No auricular hematoma. No hemotympanum     Mouth/Throat:      Pharynx: No oropharyngeal exudate. Eyes:      General:         Right eye: No discharge. Left eye: No discharge. Pupils: Pupils are equal, round, and reactive to light. Neck:      Musculoskeletal: Normal range of motion and neck supple. Cardiovascular:      Rate and Rhythm: Normal rate and regular rhythm. Heart sounds: Normal heart sounds. No murmur. No friction rub. No gallop. Pulmonary:      Effort: Pulmonary effort is normal. No respiratory distress. Breath sounds: Normal breath sounds. No stridor. No wheezing or rales. Abdominal:      General: Bowel sounds are normal. There is no distension. Palpations: Abdomen is soft. Tenderness: There is no abdominal tenderness. There is no guarding or rebound. Musculoskeletal: Normal range of motion. General: No deformity.    Skin:     General: Skin is warm and dry. Capillary Refill: Capillary refill takes less than 2 seconds. Findings: No rash. Neurological:      Mental Status: She is alert. Comments: Awake and alert  Moves all extremities equally  Speech is clear but confused   Psychiatric:         Behavior: Behavior normal.        Imaging Reviewed:   CT head shows no acute process  CT C-spine shows no acute process    Course: Tdap updated  First dose of Augmentin given    I discussed with ENT, Dr. Fabiana Crenshaw, who will follow patient up in the office    7:18 PM  Procedure Note - COMPLEX LACERATION SUTURE:    Wound Location:R ear  Wound Size: 3cm  Debridement: No  Nerve Involvement: No  Tendon Involvement: No  Foreign Bodies Found:  None    Superior auricular block performed with 1% lidocaine (3 mL total) wound irrigated using 200cc of saline under high pressure. Wound explored for foreign bodies and none found. Wound edges reapproximated and closed using 5-0 chromic gut. During this process the cartilage was placed back in anatomical position. Special care was taken to not so through the cartilage tissue itself. Technique: Simple Interrupted  Number of sutures: 8    Procedure was well tolerated with no complications. Clean dressing placed. MDM:  77-year-old female here with fall and head injury. CT head and neck negative. Patient seems to be at her baseline mental status. This was a witnessed slip and fall. Patient with significant right-sided ear laceration which was repaired as above. Return precautions provided in her discharge instructions for nursing home staff. She was sent home with Augmentin after being given her first dose as it is unclear if she will be able to get this at the nursing home tonight. Plan was discussed with her son. She was discharged home. Clinical Impression:     ICD-10-CM ICD-9-CM    1. Fall, initial encounter  [address] XXXA E888.9    2. Minor head injury, initial encounter  S09.90XA 959.01    3. Laceration of right ear, initial encounter  S01.311A 872.8            Disposition: RAUL Yancey, DO

## 2020-10-25 NOTE — PROGRESS NOTES
Admission Medication Reconciliation:    Information obtained from: medication list from [address]/Disease States:   Past Medical History:   Diagnosis Date    Alzheimer disease     Depressive episode     Dry eye syndrome     Hyperlipidemia     Macular degeneration     Muscle spasm     Primary hypertension     Spinal stenosis        Chief Complaint for this Admission:  fall    Allergies:  Hydromorphone and Morphine-naltrexone    Prior to Admission Medications:   Prior to Admission Medications   Prescriptions Last Dose Informant Patient Reported? Taking? Menthol-Zinc Oxide (CALMOSEPTINE) 0.44-20.6 % oint   Yes Yes   Sig: Apply  to affected area two (2) times daily as needed (red buttocks/barrier cream). acetaminophen (TYLENOL) 325 mg tablet   Yes Yes   Sig: Take 650 mg by mouth every four (4) hours as needed for Pain. acetaminophen (TYLENOL) 500 mg tablet   Yes Yes   Sig: Take 500 mg by mouth two (2) times a day. aspirin delayed-release 81 mg tablet   Yes Yes   Sig: Take 81 mg by mouth daily. cholecalciferol (VITAMIN D3) 1,000 unit tablet   Yes Yes   Sig: Take 1,000 Units by mouth daily. estradiol (ESTRACE) 0.01 % (0.1 mg/gram) vaginal cream   Yes Yes   Sig: Insert 2 g into vagina every Monday, Wednesday, Friday. UTI ppx   furosemide (LASIX) 20 mg tablet   Yes Yes   Sig: Take 20 mg by mouth daily. hydrocortisone (PREPARATION H HYDROCORTISONE) 1 % topical cream   Yes Yes   Sig: Apply  to affected area daily as needed for Skin Irritation or Itching. use thin layer   lactulose (ENULOSE) 10 gram/15 mL solution   Yes Yes   Sig: Take 10 g by mouth daily as needed. Indications: constipation   neomycin-bacitracnZn-polymyxnB (NEOSPORIN) 3.5-400-5,000 mg-unit-unit oipk ointment   Yes Yes   Sig: Apply 1 Packet to affected area as needed (in the event of skin tears or abrasions). polyethylene glycol (MIRALAX) 17 gram packet   Yes Yes   Sig: Take 17 g by mouth daily as needed. 23-Oct-2020 02:28 Indications: constipation   polyvinyl alcohol (LIQUIFILM TEARS) 1.4 % ophthalmic solution   Yes Yes   Sig: Administer 1 Drop to both eyes every four (4) hours as needed. Indications: Dry Eye   sertraline (ZOLOFT) 50 mg tablet   Yes Yes   Sig: Take 50 mg by mouth daily. Facility-Administered Medications: None         Comments/Recommendations: Added all of the above medications from medication list provided by [facility].

## 2021-04-06 ENCOUNTER — APPOINTMENT (OUTPATIENT)
Dept: GENERAL RADIOLOGY | Age: 86
DRG: 605 | End: 2021-04-06
Attending: EMERGENCY MEDICINE
Payer: MEDICARE

## 2021-04-06 ENCOUNTER — HOSPITAL ENCOUNTER (INPATIENT)
Age: 86
LOS: 2 days | Discharge: NURSING, REHAB OR LONG-TERM CARE | DRG: 605 | End: 2021-04-08
Attending: EMERGENCY MEDICINE | Admitting: FAMILY MEDICINE
Payer: MEDICARE

## 2021-04-06 ENCOUNTER — APPOINTMENT (OUTPATIENT)
Dept: CT IMAGING | Age: 86
DRG: 605 | End: 2021-04-06
Attending: EMERGENCY MEDICINE
Payer: MEDICARE
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
## 2021-04-06 DIAGNOSIS — S70.01XA CONTUSION OF RIGHT HIP, INITIAL ENCOUNTER: ICD-10-CM

## 2021-04-06 DIAGNOSIS — S30.1XXA ABDOMINAL WALL HEMATOMA, INITIAL ENCOUNTER: Primary | ICD-10-CM

## 2021-04-06 LAB
ALBUMIN SERPL-MCNC: 3 G/DL (ref 3.5–5)
ALBUMIN/GLOB SERPL: 0.9 {RATIO} (ref 1.1–2.2)
ALP SERPL-CCNC: 74 U/L (ref 45–117)
ALT SERPL-CCNC: 16 U/L (ref 12–78)
ANION GAP SERPL CALC-SCNC: 5 MMOL/L (ref 5–15)
APTT PPP: 23.8 SEC (ref 22.1–31)
AST SERPL-CCNC: 13 U/L (ref 15–37)
BASOPHILS # BLD: 0.1 K/UL (ref 0–0.1)
BASOPHILS NFR BLD: 1 % (ref 0–1)
BILIRUB SERPL-MCNC: 0.1 MG/DL (ref 0.2–1)
BUN SERPL-MCNC: 28 MG/DL (ref 6–20)
BUN/CREAT SERPL: 33 (ref 12–20)
CALCIUM SERPL-MCNC: 8.7 MG/DL (ref 8.5–10.1)
CHLORIDE SERPL-SCNC: 107 MMOL/L (ref 97–108)
CO2 SERPL-SCNC: 29 MMOL/L (ref 21–32)
CREAT SERPL-MCNC: 0.86 MG/DL (ref 0.55–1.02)
DIFFERENTIAL METHOD BLD: ABNORMAL
EOSINOPHIL # BLD: 0.2 K/UL (ref 0–0.4)
EOSINOPHIL NFR BLD: 3 % (ref 0–7)
ERYTHROCYTE [DISTWIDTH] IN BLOOD BY AUTOMATED COUNT: 15.7 % (ref 11.5–14.5)
GLOBULIN SER CALC-MCNC: 3.4 G/DL (ref 2–4)
GLUCOSE SERPL-MCNC: 159 MG/DL (ref 65–100)
HCT VFR BLD AUTO: 27.5 % (ref 35–47)
HGB BLD-MCNC: 8.4 G/DL (ref 11.5–16)
IMM GRANULOCYTES # BLD AUTO: 0 K/UL (ref 0–0.04)
IMM GRANULOCYTES NFR BLD AUTO: 0 % (ref 0–0.5)
INR PPP: 1 (ref 0.9–1.1)
LYMPHOCYTES # BLD: 1.7 K/UL (ref 0.8–3.5)
LYMPHOCYTES NFR BLD: 26 % (ref 12–49)
MCH RBC QN AUTO: 27 PG (ref 26–34)
MCHC RBC AUTO-ENTMCNC: 30.5 G/DL (ref 30–36.5)
MCV RBC AUTO: 88.4 FL (ref 80–99)
MONOCYTES # BLD: 0.8 K/UL (ref 0–1)
MONOCYTES NFR BLD: 12 % (ref 5–13)
NEUTS SEG # BLD: 3.9 K/UL (ref 1.8–8)
NEUTS SEG NFR BLD: 58 % (ref 32–75)
NRBC # BLD: 0 K/UL (ref 0–0.01)
NRBC BLD-RTO: 0 PER 100 WBC
PLATELET # BLD AUTO: 354 K/UL (ref 150–400)
PMV BLD AUTO: 9 FL (ref 8.9–12.9)
POTASSIUM SERPL-SCNC: 4.1 MMOL/L (ref 3.5–5.1)
PROT SERPL-MCNC: 6.4 G/DL (ref 6.4–8.2)
PROTHROMBIN TIME: 10.7 SEC (ref 9–11.1)
RBC # BLD AUTO: 3.11 M/UL (ref 3.8–5.2)
SODIUM SERPL-SCNC: 141 MMOL/L (ref 136–145)
THERAPEUTIC RANGE,PTTT: NORMAL SECS (ref 58–77)
WBC # BLD AUTO: 6.7 K/UL (ref 3.6–11)

## 2021-04-06 PROCEDURE — 36415 COLL VENOUS BLD VENIPUNCTURE: CPT

## 2021-04-06 PROCEDURE — 85025 COMPLETE CBC W/AUTO DIFF WBC: CPT

## 2021-04-06 PROCEDURE — 80053 COMPREHEN METABOLIC PANEL: CPT

## 2021-04-06 PROCEDURE — 73502 X-RAY EXAM HIP UNI 2-3 VIEWS: CPT

## 2021-04-06 PROCEDURE — U0005 INFEC AGEN DETEC AMPLI PROBE: HCPCS

## 2021-04-06 PROCEDURE — 85730 THROMBOPLASTIN TIME PARTIAL: CPT

## 2021-04-06 PROCEDURE — 99221 1ST HOSP IP/OBS SF/LOW 40: CPT | Performed by: SURGERY

## 2021-04-06 PROCEDURE — 71045 X-RAY EXAM CHEST 1 VIEW: CPT

## 2021-04-06 PROCEDURE — 99285 EMERGENCY DEPT VISIT HI MDM: CPT

## 2021-04-06 PROCEDURE — 85610 PROTHROMBIN TIME: CPT

## 2021-04-06 PROCEDURE — 96374 THER/PROPH/DIAG INJ IV PUSH: CPT

## 2021-04-06 PROCEDURE — 65270000029 HC RM PRIVATE

## 2021-04-06 PROCEDURE — 74011000636 HC RX REV CODE- 636: Performed by: RADIOLOGY

## 2021-04-06 PROCEDURE — 96375 TX/PRO/DX INJ NEW DRUG ADDON: CPT

## 2021-04-06 PROCEDURE — 74177 CT ABD & PELVIS W/CONTRAST: CPT

## 2021-04-06 PROCEDURE — 74011250636 HC RX REV CODE- 250/636: Performed by: EMERGENCY MEDICINE

## 2021-04-06 RX ORDER — SODIUM CHLORIDE 0.9 % (FLUSH) 0.9 %
5-40 SYRINGE (ML) INJECTION EVERY 8 HOURS
Status: DISCONTINUED | OUTPATIENT
Start: 2021-04-07 | End: 2021-04-08 | Stop reason: HOSPADM

## 2021-04-06 RX ORDER — PROMETHAZINE HYDROCHLORIDE 25 MG/1
12.5 TABLET ORAL
Status: DISCONTINUED | OUTPATIENT
Start: 2021-04-06 | End: 2021-04-08 | Stop reason: HOSPADM

## 2021-04-06 RX ORDER — ONDANSETRON 2 MG/ML
4 INJECTION INTRAMUSCULAR; INTRAVENOUS
Status: DISCONTINUED | OUTPATIENT
Start: 2021-04-06 | End: 2021-04-08 | Stop reason: HOSPADM

## 2021-04-06 RX ORDER — SERTRALINE HYDROCHLORIDE 50 MG/1
50 TABLET, FILM COATED ORAL DAILY
Status: DISCONTINUED | OUTPATIENT
Start: 2021-04-07 | End: 2021-04-08 | Stop reason: HOSPADM

## 2021-04-06 RX ORDER — FENTANYL CITRATE 50 UG/ML
25 INJECTION, SOLUTION INTRAMUSCULAR; INTRAVENOUS
Status: DISCONTINUED | OUTPATIENT
Start: 2021-04-06 | End: 2021-04-07

## 2021-04-06 RX ORDER — ONDANSETRON 2 MG/ML
4 INJECTION INTRAMUSCULAR; INTRAVENOUS
Status: COMPLETED | OUTPATIENT
Start: 2021-04-06 | End: 2021-04-06

## 2021-04-06 RX ORDER — ACETAMINOPHEN 650 MG/1
650 SUPPOSITORY RECTAL
Status: DISCONTINUED | OUTPATIENT
Start: 2021-04-06 | End: 2021-04-08 | Stop reason: HOSPADM

## 2021-04-06 RX ORDER — POLYETHYLENE GLYCOL 3350 17 G/17G
17 POWDER, FOR SOLUTION ORAL DAILY PRN
Status: DISCONTINUED | OUTPATIENT
Start: 2021-04-06 | End: 2021-04-08 | Stop reason: HOSPADM

## 2021-04-06 RX ORDER — SODIUM CHLORIDE 0.9 % (FLUSH) 0.9 %
5-40 SYRINGE (ML) INJECTION AS NEEDED
Status: DISCONTINUED | OUTPATIENT
Start: 2021-04-06 | End: 2021-04-08 | Stop reason: HOSPADM

## 2021-04-06 RX ORDER — ACETAMINOPHEN 325 MG/1
650 TABLET ORAL
Status: DISCONTINUED | OUTPATIENT
Start: 2021-04-06 | End: 2021-04-08 | Stop reason: HOSPADM

## 2021-04-06 RX ORDER — MELATONIN
1000 DAILY
Status: DISCONTINUED | OUTPATIENT
Start: 2021-04-07 | End: 2021-04-08 | Stop reason: HOSPADM

## 2021-04-06 RX ORDER — FENTANYL CITRATE 50 UG/ML
25 INJECTION, SOLUTION INTRAMUSCULAR; INTRAVENOUS ONCE
Status: COMPLETED | OUTPATIENT
Start: 2021-04-06 | End: 2021-04-06

## 2021-04-06 RX ORDER — ACETAMINOPHEN 325 MG/1
650 TABLET ORAL
Status: DISCONTINUED | OUTPATIENT
Start: 2021-04-06 | End: 2021-04-06 | Stop reason: SDUPTHER

## 2021-04-06 RX ADMIN — IOPAMIDOL 100 ML: 755 INJECTION, SOLUTION INTRAVENOUS at 19:25

## 2021-04-06 RX ADMIN — Medication 10 ML: at 23:55

## 2021-04-06 RX ADMIN — ONDANSETRON 4 MG: 2 INJECTION INTRAMUSCULAR; INTRAVENOUS at 19:52

## 2021-04-06 RX ADMIN — FENTANYL CITRATE 25 MCG: 50 INJECTION, SOLUTION INTRAMUSCULAR; INTRAVENOUS at 19:52

## 2021-04-06 NOTE — ED PROVIDER NOTES
This is a 43-year-old female who states that she sleeps in a high bed. She was attempting to get out but the bed was too high and she fell against the wheelchair and fell onto the floor. She did not hit her head. Her only complaint is some pain in the right hip and she is got a burning sensation in her abdomen. There is no chest pain or shoulder pain no arm or leg pain otherwise noted. She has no neck or back pain. She is alert and oriented. He did not lose any consciousness. She did not have any fever or chills, shortness of breath or other acute symptoms leading up to this event. No complaint of back pain or arm/leg pain except for the right hip. Past Medical History:   Diagnosis Date    Alzheimer disease ([facility] [address]     Depressive episode     Dry eye syndrome     Hyperlipidemia     Macular degeneration     Muscle spasm     Primary hypertension     Spinal stenosis        No past surgical history on file. History reviewed. No pertinent family history.     Social History     Socioeconomic History    Marital status:      Spouse name: Not on file    Number of children: Not on file    Years of education: Not on file    Highest education level: Not on file   Occupational History    Not on file   Social Needs    Financial resource strain: Not on file    Food insecurity     Worry: Not on file     Inability: Not on file    Transportation needs     Medical: Not on file     Non-medical: Not on file   Tobacco Use    Smoking status: Never Smoker    Smokeless tobacco: Never Used   Substance and Sexual Activity    Alcohol use: No    Drug use: No    Sexual activity: Not on file   Lifestyle    Physical activity     Days per week: Not on file     Minutes per session: Not on file    Stress: Not on file   Relationships    Social connections     Talks on phone: Not on file     Gets together: Not on file     Attends L[employer] service: Not on file     Active member of club or organization: Not on file     Attends meetings of clubs or organizations: Not on file     Relationship status: Not on file    Intimate partner violence     Fear of current or ex partner: Not on file     Emotionally abused: Not on file     Physically abused: Not on file     Forced sexual activity: Not on file   Other Topics Concern    Not on file   Social History Narrative    Not on file         ALLERGIES: Hydromorphone and Morphine-naltrexone    Review of Systems   Constitutional: Negative for activity change, appetite change and fatigue. HENT: Negative for ear pain, facial swelling, sore throat and trouble swallowing. Eyes: Negative for pain, discharge and visual disturbance. Respiratory: Negative for chest tightness, shortness of breath and wheezing. Cardiovascular: Negative for chest pain and palpitations. Gastrointestinal: Positive for abdominal pain. Negative for blood in stool, nausea and vomiting. Burning sensation in the abdomen   Genitourinary: Negative for difficulty urinating, flank pain and hematuria. Musculoskeletal: Negative for arthralgias, joint swelling, myalgias and neck pain. Pain in the right hip   Skin: Negative for color change and rash. Neurological: Negative for dizziness, weakness, numbness and headaches. Hematological: Negative for adenopathy. Does not bruise/bleed easily. Psychiatric/Behavioral: Negative for behavioral problems, confusion and sleep disturbance. All other systems reviewed and are negative. Vitals:    04/06/21 1718   BP: (!) 188/63   Pulse: 77   Resp: 16   Temp: 97.7 °F (36.5 °C)   SpO2: 98%            Physical Exam  Vitals signs and nursing note reviewed. Constitutional:       General: She is in acute distress ( Patient in some discomfort with pain in her right hip and abdominal wall. ). Appearance: She is well-developed. She is not ill-appearing.       Comments: Patient is an elderly 80-year-old lady with normal mentation and no focal neurologic deficit. Vital signs as noted. HENT:      Head: Normocephalic and atraumatic. Nose: Nose normal.   Eyes:      General: No scleral icterus. Conjunctiva/sclera: Conjunctivae normal.      Pupils: Pupils are equal, round, and reactive to light. Neck:      Musculoskeletal: Normal range of motion and neck supple. Thyroid: No thyromegaly. Vascular: No JVD. Trachea: No tracheal deviation. Comments: No carotid bruits noted. Cardiovascular:      Rate and Rhythm: Normal rate and regular rhythm. Heart sounds: Normal heart sounds. No murmur. No friction rub. No gallop. Pulmonary:      Effort: Pulmonary effort is normal. No respiratory distress. Breath sounds: Normal breath sounds. No wheezing or rales. Chest:      Chest wall: No tenderness. Abdominal:      General: Bowel sounds are normal. There is no distension. Palpations: Abdomen is soft. There is no mass. Tenderness: There is no abdominal tenderness. There is no guarding or rebound. Comments: The abdomen is remarkable for large hematoma larger than the size of an orange. It is firm and abraded. It is quite tender to palpation. Her abdomen is moderately soft. Musculoskeletal: Normal range of motion. General: Tenderness and signs of injury present. No swelling or deformity. Comments: There is some tenderness to palpation over the right hip although no deformity is noted. Lymphadenopathy:      Cervical: No cervical adenopathy. Skin:     General: Skin is warm and dry. Findings: No erythema or rash. Neurological:      Mental Status: She is alert and oriented to person, place, and time. Cranial Nerves: No cranial nerve deficit. Coordination: Coordination normal.      Deep Tendon Reflexes: Reflexes are normal and symmetric. Psychiatric:         Behavior: Behavior normal.         Thought Content:  Thought content normal.         Judgment: Judgment normal. MDM  Number of Diagnoses or Management Options  Abdominal wall hematoma, initial encounter: new and requires workup  Contusion of right hip, initial encounter: new and requires workup  Fall, initial encounter: new and requires workup     Amount and/or Complexity of Data Reviewed  Clinical lab tests: ordered and reviewed  Tests in the radiology section of CPT®: ordered and reviewed  Decide to obtain previous medical records or to obtain history from someone other than the patient: yes  Review and summarize past medical records: yes  Discuss the patient with other providers: yes  Independent visualization of images, tracings, or specimens: yes    Risk of Complications, Morbidity, and/or Mortality  Presenting problems: high  Diagnostic procedures: high  Management options: high    Patient Progress  Patient progress: stable         Procedures    The x-rays of her hip and CT of her abdomen given the size of the hematoma. It does not appear that she is on any blood thinners. 7:18 PM patient is complaining of severe pain in the abdominal wall where she is got the large hematoma. She is allergic to Dilaudid and morphine. We will give her an extremely low dose of fentanyl and Zofran. We will also have an ice compress placed on the abdominal wall. It is noted by CT that there is a large hematoma the anterior abdominal wall and there is also evidence of extravasation actively either from arterial or venous sources. The patient is complaining of pain. She will require admission for observation and any acute intervention that may be necessary. We will consult general surgery and the hospitalist. 8:24 PM    Perfect Serve Consult for Admission  8:25 PM    ED Room Number: ER17/17  Patient Name and age: Dominik Jane 80 y.o.  female  Working Diagnosis:   1. Abdominal wall hematoma, initial encounter    2. Contusion right hip  3.         fall  COVID-19 Suspicion:  no  Sepsis present:  no  Reassessment needed: no  Code Status:  Full Code  Readmission: no  Isolation Requirements:  no  Recommended Level of Care:  telemetry  Department:[facility] Adult ED - 21   Other:  Surgery consulted     General surgery has seen in the ED and agrees with conservative management and admission.

## 2021-04-07 LAB
AMORPH CRY URNS QL MICRO: ABNORMAL
APPEARANCE UR: ABNORMAL
BACTERIA URNS QL MICRO: NEGATIVE /HPF
BILIRUB UR QL: NEGATIVE
COLOR UR: ABNORMAL
EPITH CASTS URNS QL MICRO: ABNORMAL /LPF
GLUCOSE UR STRIP.AUTO-MCNC: NEGATIVE MG/DL
HCT VFR BLD AUTO: 25.2 % (ref 35–47)
HCT VFR BLD AUTO: 25.2 % (ref 35–47)
HCT VFR BLD AUTO: 26.3 % (ref 35–47)
HCT VFR BLD AUTO: 28.4 % (ref 35–47)
HGB BLD-MCNC: 7.6 G/DL (ref 11.5–16)
HGB BLD-MCNC: 7.7 G/DL (ref 11.5–16)
HGB BLD-MCNC: 7.8 G/DL (ref 11.5–16)
HGB BLD-MCNC: 8.4 G/DL (ref 11.5–16)
HGB UR QL STRIP: NEGATIVE
KETONES UR QL STRIP.AUTO: NEGATIVE MG/DL
LEUKOCYTE ESTERASE UR QL STRIP.AUTO: ABNORMAL
NITRITE UR QL STRIP.AUTO: NEGATIVE
PH UR STRIP: 8.5 [PH] (ref 5–8)
PROT UR STRIP-MCNC: NEGATIVE MG/DL
RBC #/AREA URNS HPF: ABNORMAL /HPF (ref 0–5)
SARS-COV-2, COV2: NORMAL
SARS-COV-2, XPLCVT: NOT DETECTED
SOURCE, COVRS: NORMAL
SP GR UR REFRACTOMETRY: >1.03
UROBILINOGEN UR QL STRIP.AUTO: 0.2 EU/DL (ref 0.2–1)
WBC URNS QL MICRO: ABNORMAL /HPF (ref 0–4)

## 2021-04-07 PROCEDURE — 36415 COLL VENOUS BLD VENIPUNCTURE: CPT

## 2021-04-07 PROCEDURE — 65270000029 HC RM PRIVATE

## 2021-04-07 PROCEDURE — 74011250636 HC RX REV CODE- 250/636: Performed by: FAMILY MEDICINE

## 2021-04-07 PROCEDURE — 81001 URINALYSIS AUTO W/SCOPE: CPT

## 2021-04-07 PROCEDURE — 85014 HEMATOCRIT: CPT

## 2021-04-07 PROCEDURE — 74011250637 HC RX REV CODE- 250/637: Performed by: FAMILY MEDICINE

## 2021-04-07 PROCEDURE — 99231 SBSQ HOSP IP/OBS SF/LOW 25: CPT | Performed by: SURGERY

## 2021-04-07 RX ORDER — HYDRALAZINE HYDROCHLORIDE 20 MG/ML
10 INJECTION INTRAMUSCULAR; INTRAVENOUS
Status: DISCONTINUED | OUTPATIENT
Start: 2021-04-07 | End: 2021-04-08 | Stop reason: HOSPADM

## 2021-04-07 RX ADMIN — Medication 1000 UNITS: at 09:31

## 2021-04-07 RX ADMIN — Medication 10 ML: at 06:47

## 2021-04-07 RX ADMIN — SERTRALINE 50 MG: 50 TABLET, FILM COATED ORAL at 09:31

## 2021-04-07 RX ADMIN — Medication 10 ML: at 12:45

## 2021-04-07 RX ADMIN — Medication 10 ML: at 22:27

## 2021-04-07 RX ADMIN — FENTANYL CITRATE 25 MCG: 50 INJECTION, SOLUTION INTRAMUSCULAR; INTRAVENOUS at 02:54

## 2021-04-07 NOTE — PROGRESS NOTES
Problem: Falls - Risk of  Goal: *Absence of Falls  Description: Document [place] Fall Risk and appropriate interventions in the flowsheet. Outcome: Progressing Towards Goal  Note: Fall Risk Interventions:                                Problem: Patient Education: Go to Patient Education Activity  Goal: Patient/Family Education  Outcome: Progressing Towards Goal     Problem: Risk for Spread of Infection  Goal: Prevent transmission of infectious organism to others  Description: Prevent the transmission of infectious organisms to other patients, staff members, and visitors.   Outcome: Progressing Towards Goal     Problem: Patient Education:  Go to Education Activity  Goal: Patient/Family Education  Outcome: Progressing Towards Goal

## 2021-04-07 NOTE — PROGRESS NOTES
SIMRAN:  RUR:  7%    Disposition:  Return to [facility]    Chart reviewed. Patient admitted on 4/6/21 from  [facility]. CM contacted [facility]- [phone] (facility liaison for assessment information). The patient transitioned from Assisted Living to LTC on 11/2/2020. Patient brought in by EMS from LT due to a GLF where patient hit abdomin on a wheelchair. Patient sustained an abdominal wall hematoma. Surg consulted - no surgical intervention at this time. The patient has a past medical hx of Dementia,  Heart failure, Major Depressive Disorder, Hypertension, Heart failure, Anemia, Frequent Falls, Spinal Stenosis, Macular Degeneration, SBL UTI. CM sending updates to facility via [place]. CM continuing to follow. Amee Rasmussen, [address], [facility]    Care Management Interventions  PCP Verified by CM:  Yes  Mode of Transport at Discharge: BLS  Transition of Care Consult (CM Consult): Long Term Care  Discharge Durable Medical Equipment: No  Physical Therapy Consult: No  Occupational Therapy Consult: No  Speech Therapy Consult: No  Current Support Network: Nursing Facility(LTC staff/family)  Discharge Location  Discharge Placement: [address] [place] [address]

## 2021-04-07 NOTE — CONSULTS
[facility] [address]     Subjective: Yuriy Cedeno is a 80 y.o. female who presents for evaluation of abdominal wall hematoma. Patient apparently is significantly debilitated, DO NOT RESUSCITATE, dementia, fell out of bed onto her and locked wheelchair striking the wheelchair with her abdominal wall on the right side developing abdominal wall hematoma. There was some extravasation into the hematoma of blood, on CT scan but this should tamponade and there is nothing surgical to do for this at this point. Trying to explore this would lead to increased morbidity and this will need to resolve on its own and require exploration only if becomes a wound issue. Otherwise would recommend pain management. No other intra-abdominal injury on CT    Past Medical History:   Diagnosis Date    Alzheimer disease ([facility] [address]     Depressive episode     Dry eye syndrome     Hyperlipidemia     Macular degeneration     Muscle spasm     Primary hypertension     Spinal stenosis      No past surgical history on file. History reviewed. No pertinent family history. Social History     Tobacco Use    Smoking status: Never Smoker    Smokeless tobacco: Never Used   Substance Use Topics    Alcohol use: No      Prior to Admission medications    Medication Sig Start Date End Date Taking? Authorizing Provider   lidocaine (LIDODERM) 5 % Apply patch to the affected area for 12 hours a day and remove for 12 hours a day. 6/27/18   Dave Dang MD   ibuprofen (MOTRIN) 600 mg tablet Take 1 Tab by mouth every six (6) hours as needed. 6/26/18   Dave Dang MD   cyclobenzaprine (FLEXERIL) 10 mg tablet Take 0.5 Tabs by mouth two (2) times daily as needed for Muscle Spasm(s). 6/26/18   Dave Dang MD   aspirin delayed-release 81 mg tablet Take 81 mg by mouth daily. Provider, Historical   sertraline (ZOLOFT) 50 mg tablet Take 50 mg by mouth daily.     Provider, Historical   cholecalciferol (VITAMIN D3) 1,000 unit tablet Take 1,000 Units by mouth daily. Provider, Historical   polyethylene glycol (MIRALAX) 17 gram packet Take 17 g by mouth daily as needed. Indications: constipation    Provider, Historical   acetaminophen (TYLENOL) 500 mg tablet Take 500 mg by mouth two (2) times a day. Provider, Historical   Menthol-Zinc Oxide (CALMOSEPTINE) 0.44-20.6 % oint Apply  to affected area two (2) times daily as needed (red buttocks/barrier cream). Provider, Historical   acetaminophen (TYLENOL) 325 mg tablet Take 650 mg by mouth every four (4) hours as needed for Pain. Provider, Historical   lactulose (ENULOSE) 10 gram/15 mL solution Take 10 g by mouth daily as needed. Indications: constipation    Provider, Historical   polyvinyl alcohol (LIQUIFILM TEARS) 1.4 % ophthalmic solution Administer 1 Drop to both eyes every four (4) hours as needed. Indications: Dry Eye    Provider, Historical   neomycin-bacitracnZn-polymyxnB (NEOSPORIN) 3.5-400-5,000 mg-unit-unit oipk ointment Apply 1 Packet to affected area as needed (in the event of skin tears or abrasions). Provider, Historical   hydrocortisone (PREPARATION H HYDROCORTISONE) 1 % topical cream Apply  to affected area daily as needed for Skin Irritation or Itching. use thin layer    Provider, Historical   estradiol (ESTRACE) 0.01 % (0.1 mg/gram) vaginal cream Insert 2 g into vagina every Monday, Wednesday, Friday. UTI ppx    Provider, Historical      Allergies   Allergen Reactions    Hydromorphone Unknown (comments)    Morphine-Naltrexone Unknown (comments)       Review of Systems   Unable to perform ROS: Dementia   Gastrointestinal: Positive for abdominal pain.        Objective:     Patient Vitals for the past 8 hrs:   BP Temp Pulse Resp SpO2   04/06/21 2342 (!) 199/67 97.4 °F (36.3 °C) 74 18 97 %   04/06/21 2300 (!) 124/54 -- -- -- 100 %   04/06/21 2230 (!) 140/98 -- -- -- (!) 78 %   04/06/21 2215 (!) 141/83 -- -- -- 94 %   04/06/21 2200 (!) 146/112 -- -- -- 100 % 21 (!) 160/60 -- -- -- 100 %   21 (!) 168/52 -- -- -- 100 %   21 (!) 173/52 -- -- -- 97 %   21 (!) 146/51 -- -- -- 97 %   21 (!) 140/49 -- -- -- 95 %   21 (!) 134/45 -- -- -- 92 %   21 (!) 152/52 -- -- -- 99 %   21 1718 (!) 188/63 97.7 °F (36.5 °C) 77 16 98 %       Temp (24hrs), Av.6 °F (36.4 °C), Min:97.4 °F (36.3 °C), Max:97.7 °F (36.5 °C)      Physical Exam  Vitals signs and nursing note reviewed. Pulmonary:      Effort: Pulmonary effort is normal.   Abdominal:      Comments: Flat soft, but with baseball sized hematoma tender right lower abdomen skin integrity bruised but intact no evidence of skin necrosis. Hematoma firm otherwise abdomen soft nontender   Neurological:      Mental Status: She is alert. Comments: Dementia         Assessment:     Active Problems:    Abdominal wall hematoma (2021)        Plan:     Trying to explore this would lead to increased morbidity and this will need to resolve on its own and require exploration only if becomes a wound issue. Otherwise would recommend pain management. No plans for surgical intervention, will follow as needed, call us if need further involvement but otherwise will sign off.   Understand patient is being admitted to the medical service for management and pain control      FACE TO FACE time including review of any indicated imaging, discussion with patient, and other providers, exam and discussion with patient:   22        Minutes    Signed By: Alesha Poe MD   [facility]    2021

## 2021-04-07 NOTE — ROUTINE PROCESS
TRANSFER - OUT REPORT:    Verbal report given to Martin Downing RN (name) on Umm Kwok  being transferred to [address] room [phone] [id number] (unit) for routine progression of care       Report consisted of patients Situation, Background, Assessment and   Recommendations(SBAR). Information from the following report(s) SBAR, ED Summary and MAR was reviewed with the receiving nurse. Lines:   Peripheral IV 04/06/21 Right Antecubital (Active)        Opportunity for questions and clarification was provided.       Patient transported with:   [employer]

## 2021-04-07 NOTE — H&P
Hospitalist Admission Note    NAME: Floyd Babin   :  8/15/1925   MRN:  [id number]     Date/Time:  2021 10:18 PM    Patient PCP: Hermilo De Souza MD  ______________________________________________________________________  Given the patient's current clinical presentation, I have a high level of concern for decompensation if discharged from the emergency department. Complex decision making was performed, which includes reviewing the patient's available past medical records, laboratory results, and x-ray films. My assessment of this patient's clinical condition and my plan of care is as follows. Assessment / Plan:    Patient 80year-old admitted for abdominal wall hematoma, status post ground-level fall  1. Abdominal wall hematoma; CT scan showsAn 8.4 x 3.9 x 5.5 cm subcutaneous hematoma, multiple small foci of active extravasation, which may be arterial or venous. Hospitalist will admit for further evaluation and treatment. Surgery on-call also consulted. Will monitor hemoglobin. 2.  History of mood disorder: Resume Zoloft  3. History of dementia: Patient not agitated. Continue monitoring  4. Abdominal pain: Due to hematoma: Symptomatic treatment      Code Status: DNR  Surrogate Decision Maker:    DVT Prophylaxis: SCD  GI Prophylaxis: not indicated    Baseline: Patient ambulated with assistive device      Subjective:   CHIEF COMPLAINT: Ground-level fall     HISTORY OF PRESENT ILLNESS:     Floyd Babin is a 80 y.o.  female who presents with above symptoms. Her medical history significant for dementia without behavioral disturbance, major depressive disorder, hypertension, heart failure, anemia, frequent fall, spinal stenosis, macular degeneration, ESBL UTI  Patient reports this evening was trying to come out of her bed. Reached out to wheelchair. It was not locked. Patient unable to control her balance. Tete Bo on the floor. Reports did not hit her head.   Not sure what hit her right side of the abdomen, but reports severe pain. Denies nausea vomiting. Reports was at her normal level of health prior to this fall. Denies chest pain shortness of breath palpitation prior to this fall. Denies recurrent fall. Upon arrival in the emergency room abdominal CT scan showed large subcutaneous hematoma, possibly extravasation from artery/vein, general surgery consulted. Hospitalist will admit. Surgery advised monitor and , no intervention indicated     We were asked to admit for work up and evaluation of the above problems. Past Medical History:   Diagnosis Date    Alzheimer disease ([facility] [address]     Depressive episode     Dry eye syndrome     Hyperlipidemia     Macular degeneration     Muscle spasm     Primary hypertension     Spinal stenosis         No past surgical history on file. Social History     Tobacco Use    Smoking status: Never Smoker    Smokeless tobacco: Never Used   Substance Use Topics    Alcohol use: No        History reviewed. No pertinent family history. Allergies   Allergen Reactions    Hydromorphone Unknown (comments)    Morphine-Naltrexone Unknown (comments)        Prior to Admission medications    Medication Sig Start Date End Date Taking? Authorizing Provider   lidocaine (LIDODERM) 5 % Apply patch to the affected area for 12 hours a day and remove for 12 hours a day. 6/27/18   Emily Dang MD   ibuprofen (MOTRIN) 600 mg tablet Take 1 Tab by mouth every six (6) hours as needed. 6/26/18   Emily Dang MD   cyclobenzaprine (FLEXERIL) 10 mg tablet Take 0.5 Tabs by mouth two (2) times daily as needed for Muscle Spasm(s). 6/26/18   Emily Dang MD   aspirin delayed-release 81 mg tablet Take 81 mg by mouth daily. Provider, Historical   sertraline (ZOLOFT) 50 mg tablet Take 50 mg by mouth daily. Provider, Historical   cholecalciferol (VITAMIN D3) 1,000 unit tablet Take 1,000 Units by mouth daily.     Provider, Historical   polyethylene glycol (MIRALAX) 17 gram packet Take 17 g by mouth daily as needed. Indications: constipation    Provider, Historical   acetaminophen (TYLENOL) 500 mg tablet Take 500 mg by mouth two (2) times a day. Provider, Historical   Menthol-Zinc Oxide (CALMOSEPTINE) 0.44-20.6 % oint Apply  to affected area two (2) times daily as needed (red buttocks/barrier cream). Provider, Historical   acetaminophen (TYLENOL) 325 mg tablet Take 650 mg by mouth every four (4) hours as needed for Pain. Provider, Historical   lactulose (ENULOSE) 10 gram/15 mL solution Take 10 g by mouth daily as needed. Indications: constipation    Provider, Historical   polyvinyl alcohol (LIQUIFILM TEARS) 1.4 % ophthalmic solution Administer 1 Drop to both eyes every four (4) hours as needed. Indications: Dry Eye    Provider, Historical   neomycin-bacitracnZn-polymyxnB (NEOSPORIN) 3.5-400-5,000 mg-unit-unit oipk ointment Apply 1 Packet to affected area as needed (in the event of skin tears or abrasions). Provider, Historical   hydrocortisone (PREPARATION H HYDROCORTISONE) 1 % topical cream Apply  to affected area daily as needed for Skin Irritation or Itching. use thin layer    Provider, Historical   estradiol (ESTRACE) 0.01 % (0.1 mg/gram) vaginal cream Insert 2 g into vagina every Monday, Wednesday, Friday. UTI ppx    Provider, Historical       REVIEW OF SYSTEMS:     I am not able to complete the review of systems because:    The patient is intubated and sedated    The patient has altered mental status due to his acute medical problems    The patient has baseline aphasia from prior stroke(s)    The patient has baseline dementia and is not reliable historian    The patient is in acute medical distress and unable to provide information           Total of 12 systems reviewed as follows:       POSITIVE= underlined text  Negative = text not underlined  General:  fever, chills, sweats, generalized weakness, weight loss/gain,      loss of appetite Eyes:    blurred vision, eye pain, loss of vision, double vision  ENT:    rhinorrhea, pharyngitis   Respiratory:   cough, sputum production, SOB, ALVAREZ, wheezing, pleuritic pain   Cardiology:   chest pain, palpitations, orthopnea, PND, edema, syncope   Gastrointestinal:  Right side abdominal wall pain , N/V, diarrhea, dysphagia, constipation, bleeding   Genitourinary:  frequency, urgency, dysuria, hematuria, incontinence   Muskuloskeletal :  arthralgia, myalgia, back pain  Hematology:  easy bruising, nose or gum bleeding, lymphadenopathy   Dermatological: Right side of the abdomen with large bruise  Endocrine:   hot flashes or polydipsia   Neurological:  headache, dizziness, confusion, focal weakness, paresthesia,     Speech difficulties, memory loss, gait difficulty  Psychological: Feelings of anxiety, depression, agitation    Objective:   VITALS:    Visit Vitals  BP (!) 188/63 (BP 1 Location: Left upper arm)   Pulse 77   Temp 97.7 °F (36.5 °C)   Resp 16   SpO2 98%       PHYSICAL EXAM:    General:    Alert, cooperative, no distress, appears stated age. HEENT: Atraumatic, anicteric sclerae, pink conjunctivae     No oral ulcers, mucosa moist, throat clear, dentition fair  Neck:  Supple, symmetrical,  thyroid: non tender  Lungs:   Clear to auscultation bilaterally. No Wheezing or Rhonchi. No rales. Chest wall:  No tenderness  No Accessory muscle use. Heart:   Regular  rhythm,  No  murmur   No edema  Abdomen:   Soft, non-tender. Not distended. Bowel sounds normal  Extremities: No cyanosis. No clubbing,      Skin turgor normal, Capillary refill normal, Radial dial pulse 2+  Skin:     Large bruise on right side of the abdomen  Psych:  Good insight. Not depressed. Not anxious or agitated. Neurologic: EOMs intact. No facial asymmetry. No aphasia or slurred speech. Symmetrical strength, Sensation grossly intact.  Alert and oriented.    _______________________________________________________________________  Care Plan discussed with:    Comments   Patient     Family      RN     Care Manager                    Consultant:      _______________________________________________________________________  Expected  Disposition:   Home with Family    HH/PT/OT/RN    SNF/LTC    FRANC    ________________________________________________________________________  TOTAL TIME:  39 Minutes    Critical Care Provided     Minutes non procedure based      Comments     Reviewed previous records   >50% of visit spent in counseling and coordination of care  Discussion with patient and/or family and questions answered       ________________________________________________________________________  Signed: Darryl Cantu MD    Procedures: see electronic medical records for all procedures/Xrays and details which were not copied into this note but were reviewed prior to creation of Plan. LAB DATA REVIEWED:    Recent Results (from the past 24 hour(s))   CBC WITH AUTOMATED DIFF    Collection Time: 04/06/21  6:19 PM   Result Value Ref Range    WBC 6.7 3.6 - 11.0 K/uL    RBC 3.11 (L) 3.80 - 5.20 M/uL    HGB 8.4 (L) 11.5 - 16.0 g/dL    HCT 27.5 (L) 35.0 - 47.0 %    MCV 88.4 80.0 - 99.0 FL    MCH 27.0 26.0 - 34.0 PG    MCHC 30.5 30.0 - 36.5 g/dL    RDW 15.7 (H) 11.5 - 14.5 %    PLATELET 472 163 - 989 K/uL    MPV 9.0 8.9 - 12.9 FL    NRBC 0.0 0  WBC    ABSOLUTE NRBC 0.00 0.00 - 0.01 K/uL    NEUTROPHILS 58 32 - 75 %    LYMPHOCYTES 26 12 - 49 %    MONOCYTES 12 5 - 13 %    EOSINOPHILS 3 0 - 7 %    BASOPHILS 1 0 - 1 %    IMMATURE GRANULOCYTES 0 0.0 - 0.5 %    ABS. NEUTROPHILS 3.9 1.8 - 8.0 K/UL    ABS. LYMPHOCYTES 1.7 0.8 - 3.5 K/UL    ABS. MONOCYTES 0.8 0.0 - 1.0 K/UL    ABS. EOSINOPHILS 0.2 0.0 - 0.4 K/UL    ABS. BASOPHILS 0.1 0.0 - 0.1 K/UL    ABS. IMM.  GRANS. 0.0 0.00 - 0.04 K/UL    DF AUTOMATED     METABOLIC PANEL, COMPREHENSIVE    Collection Time: 04/06/21  6:19 PM   Result Value Ref Range    Sodium 141 136 - 145 mmol/L    Potassium 4.1 3.5 - 5.1 mmol/L    Chloride 107 97 - 108 mmol/L    CO2 29 21 - 32 mmol/L    Anion gap 5 5 - 15 mmol/L    Glucose 159 (H) 65 - 100 mg/dL    BUN 28 (H) 6 - 20 MG/DL    Creatinine 0.86 0.55 - 1.02 MG/DL    BUN/Creatinine ratio 33 (H) 12 - 20      GFR est AA >60 >60 ml/min/1.73m2    GFR est non-AA >60 >60 ml/min/1.73m2    Calcium 8.7 8.5 - 10.1 MG/DL    Bilirubin, total 0.1 (L) 0.2 - 1.0 MG/DL    ALT (SGPT) 16 12 - 78 U/L    AST (SGOT) 13 (L) 15 - 37 U/L    Alk.  phosphatase 74 45 - 117 U/L    Protein, total 6.4 6.4 - 8.2 g/dL    Albumin 3.0 (L) 3.5 - 5.0 g/dL    Globulin 3.4 2.0 - 4.0 g/dL    A-G Ratio 0.9 (L) 1.1 - 2.2     PTT    Collection Time: 04/06/21  6:19 PM   Result Value Ref Range    aPTT 23.8 22.1 - 31.0 sec    aPTT, therapeutic range     58.0 - 77.0 SECS   PROTHROMBIN TIME + INR    Collection Time: 04/06/21  6:19 PM   Result Value Ref Range    INR 1.0 0.9 - 1.1      Prothrombin time 10.7 9.0 - 11.1 sec

## 2021-04-07 NOTE — PROGRESS NOTES
[address] [facility] Adult  Hospitalist Group                                                                                          Hospitalist Progress Note  Nikolas Gray NP  Answering service: [phone] OR [phone] from in house phone        Date of Service:  2021  NAME:  Bob Wei  :  8/15/1925  MRN:  [id number]      Admission Summary:   Per H&P: Bob Wei is a 80 y.o.  female who presents with above symptoms. Her medical history significant for dementia without behavioral disturbance, major depressive disorder, hypertension, heart failure, anemia, frequent fall, spinal stenosis, macular degeneration, ESBL UTI  Patient reports this evening was trying to come out of her bed. Reached out to wheelchair. It was not locked. Patient unable to control her balance. Patbaldemara Allison on the floor. Reports did not hit her head. Not sure what hit her right side of the abdomen, but reports severe pain. Denies nausea vomiting. Reports was at her normal level of health prior to this fall. Denies chest pain shortness of breath palpitation prior to this fall. Denies recurrent fall. Upon arrival in the emergency room abdominal CT scan showed large subcutaneous hematoma, possibly extravasation from artery/vein, general surgery consulted. Hospitalist will admit. Surgery advised monitor and , no intervention indicated     Interval history / Subjective:    Seen and examined patient sitting up in bed. States that she is feeling ok. Right lower quadrant of abdomen bruise noted. States tenderness if touched. Denies any syncope, dizziness, shortness of breat, chest pain or tightness, nausea, vomiting, or diarrhea. No new complaints noted. No overnight events noted.       Assessment & Plan:     Abdominal wall hematoma d/t ground level fall   - CT scan showsAn 8.4 x 3.9 x 5.5 cm subcutaneous hematoma, multiple small foci of active extravasation, which may be arterial or venous.  - General surgery consult- No surgical interventions recommended at this time  - Pain control   - Monitor H&H for bleeding     Anemia, likely seconday to above   - hgb 7.8 then 8.4 this morning   - monitor H&H every 6 hours   - transfuse for hgb <7.0     Abdominal pain   - Secondary to above   - Pain control   - Monitor     Hx of mood disorder   - Continue Zoloft     Hx of dementia   - Supportive treatment     Code status: DNR  DVT prophylaxis: SCDs    Care Plan discussed with: Patient/Family, Nurse and   Anticipated Disposition: TBD  Anticipated Discharge: 24 hours to 48 hours     Hospital Problems  Date Reviewed: 6/21/2018          Codes Class Noted POA    Abdominal wall hematoma ICD-10-CM: S30. 1XXA  ICD-9-CM: 922.2  4/6/2021 Unknown                Review of Systems:   A comprehensive review of systems was negative except for that written in the HPI. Vital Signs:    Last 24hrs VS reviewed since prior progress note. Most recent are:  Visit Vitals  BP (!) 159/66 (BP 1 Location: Left upper arm, BP Patient Position: At rest)   Pulse 76   Temp 97.4 °F (36.3 °C)   Resp 18   SpO2 96%       No intake or output data in the 24 hours ending 04/07/21 1105     Physical Examination:             Constitutional:  No acute distress, cooperative, pleasant    ENT:  Oral mucosa moist, oropharynx benign. Resp:  CTA bilaterally. No wheezing/rhonchi/rales. No accessory muscle use   CV:  Regular rhythm, normal rate, no murmurs, gallops, rubs    GI:  Soft, non distended, non tender. normoactive bowel sounds, Left lower quadrant bruise noted. Musculoskeletal:  No edema, warm, 2+ pulses throughout    Neurologic:  Moves all extremities with generalized weakness. Follows commands.   AAOx2-3, CN II-XII reviewed     Psych:  Not anxious or agitated       Data Review:    Review and/or order of clinical lab test  Review and/or order of tests in the radiology section of CPT  Review and/or order of tests in the medicine section of CPT      Labs: Recent Labs     04/07/21  0957 04/07/21  0400 04/06/21 1819   WBC  --   --  6.7   HGB 8.4* 7.8* 8.4*   HCT 28.4* 26.3* 27.5*   PLT  --   --  354     Recent Labs     04/06/21 1819      K 4.1      CO2 29   BUN 28*   CREA 0.86   *   CA 8.7     Recent Labs     04/06/21 1819   ALT 16   AP 74   TBILI 0.1*   TP 6.4   ALB 3.0*   GLOB 3.4     Recent Labs     04/06/21 1819   INR 1.0   PTP 10.7   APTT 23.8      No results for input(s): FE, TIBC, PSAT, FERR in the last 72 hours. No results found for: FOL, RBCF   No results for input(s): PH, PCO2, PO2 in the last 72 hours. No results for input(s): CPK, CKNDX, TROIQ in the last 72 hours.     No lab exists for component: CPKMB  No results found for: CHOL, CHOLX, CHLST, CHOLV, HDL, HDLP, LDL, LDLC, DLDLP, TGLX, TRIGL, TRIGP, CHHD, CHHDX  No results found for: [facility]  Lab Results   Component Value Date/Time    Color YELLOW/STRAW 06/21/2018 10:35 AM    Appearance CLOUDY (A) 06/21/2018 10:35 AM    Specific gravity 1.012 06/21/2018 10:35 AM    pH (UA) 8.0 06/21/2018 10:35 AM    Protein NEGATIVE  06/21/2018 10:35 AM    Glucose NEGATIVE  06/21/2018 10:35 AM    Ketone NEGATIVE  06/21/2018 10:35 AM    Bilirubin NEGATIVE  06/21/2018 10:35 AM    Urobilinogen 0.2 06/21/2018 10:35 AM    Nitrites POSITIVE (A) 06/21/2018 10:35 AM    Leukocyte Esterase SMALL (A) 06/21/2018 10:35 AM    Epithelial cells FEW 06/21/2018 10:35 AM    Bacteria 3+ (A) 06/21/2018 10:35 AM    WBC 0-4 06/21/2018 10:35 AM    RBC 0-5 06/21/2018 10:35 AM         Medications Reviewed:     Current Facility-Administered Medications   Medication Dose Route Frequency    cholecalciferol (VITAMIN D3) (1000 Units /25 mcg) tablet 1,000 Units  1,000 Units Oral DAILY    lactulose (CHRONULAC) 10 gram/15 mL solution 15 mL  10 g Oral DAILY PRN    sertraline (ZOLOFT) tablet 50 mg  50 mg Oral DAILY    sodium chloride (NS) flush 5-40 mL  5-40 mL IntraVENous Q8H    sodium chloride (NS) flush 5-40 mL  5-40 mL IntraVENous PRN    acetaminophen (TYLENOL) tablet 650 mg  650 mg Oral Q6H PRN    Or    acetaminophen (TYLENOL) suppository 650 mg  650 mg Rectal Q6H PRN    polyethylene glycol (MIRALAX) packet 17 g  17 g Oral DAILY PRN    promethazine (PHENERGAN) tablet 12.5 mg  12.5 mg Oral Q6H PRN    Or    ondansetron (ZOFRAN) injection 4 mg  4 mg IntraVENous Q6H PRN    fentaNYL citrate (PF) injection 25 mcg  25 mcg IntraVENous Q4H PRN     ______________________________________________________________________  EXPECTED LENGTH OF STAY: - - -  ACTUAL LENGTH OF STAY:          1                 Mukesh Coates NP

## 2021-04-07 NOTE — PROGRESS NOTES
[facility] at [facility] Surgery        Subjective     No acute issues, complaining of body aches and abdominal pain    Objective     Patient Vitals for the past 24 hrs:   Temp Pulse Resp BP SpO2   04/07/21 0247 97.9 °F (36.6 °C) 76 16 (!) 144/63 98 %   04/06/21 2342 97.4 °F (36.3 °C) 74 18 (!) 199/67 97 %   04/06/21 2300 -- -- -- (!) 124/54 100 %   04/06/21 2230 -- -- -- (!) 140/98 (!) 78 %   04/06/21 2215 -- -- -- (!) 141/83 94 %   04/06/21 2200 -- -- -- (!) 146/112 100 %   04/06/21 2145 -- -- -- (!) 160/60 100 %   04/06/21 2130 -- -- -- (!) 168/52 100 %   04/06/21 2115 -- -- -- (!) 173/52 97 %   04/06/21 2030 -- -- -- (!) 146/51 97 %   04/06/21 2015 -- -- -- (!) 140/49 95 %   04/06/21 2000 -- -- -- (!) 134/45 92 %   04/06/21 1945 -- -- -- (!) 152/52 99 %   04/06/21 1718 97.7 °F (36.5 °C) 77 16 (!) 188/63 98 %           PE  Pulm - CTAB  CV - RRR  Abd -soft, nondistended, bowel sounds present, bruising in the lower abdomen a little bit more to the right side with some hematoma present, no overlying skin changes    Labs  Recent Results (from the past 12 hour(s))   SARS-COV-2    Collection Time: 04/06/21 11:13 PM   Result Value Ref Range    SARS-CoV-2 Please find results under separate order     HGB & HCT    Collection Time: 04/07/21  4:00 AM   Result Value Ref Range    HGB 7.8 (L) 11.5 - 16.0 g/dL    HCT 26.3 (L) 35.0 - 47.0 %         Assessment     Analy Brink is a 80 y. o.yr old female with abdominal wall soft tissue hematoma after fall    Plan     She does not have any signs of major blood loss from the abdominal wall hematoma  Follow hemoglobin  Continue diet as tolerated  No plans for any surgical intervention  Following peripherally    Shagufta Dumont MD

## 2021-04-07 NOTE — PROGRESS NOTES
Verbal shift change report given to Lamont Hernandez (oncoming nurse) by Kavon Ward (offgoing nurse). Report included the following information SBAR.

## 2021-04-08 VITALS
OXYGEN SATURATION: 95 % | HEART RATE: 94 BPM | BODY MASS INDEX: 22.5 KG/M2 | WEIGHT: 123.02 LBS | SYSTOLIC BLOOD PRESSURE: 158 MMHG | TEMPERATURE: 98.2 F | RESPIRATION RATE: 18 BRPM | DIASTOLIC BLOOD PRESSURE: 62 MMHG

## 2021-04-08 LAB
HCT VFR BLD AUTO: 26.2 % (ref 35–47)
HGB BLD-MCNC: 8 G/DL (ref 11.5–16)

## 2021-04-08 PROCEDURE — 74011250637 HC RX REV CODE- 250/637: Performed by: FAMILY MEDICINE

## 2021-04-08 PROCEDURE — 85018 HEMOGLOBIN: CPT

## 2021-04-08 PROCEDURE — 74011250637 HC RX REV CODE- 250/637: Performed by: NURSE PRACTITIONER

## 2021-04-08 PROCEDURE — 36415 COLL VENOUS BLD VENIPUNCTURE: CPT

## 2021-04-08 RX ORDER — POLYETHYLENE GLYCOL 3350 17 G/17G
17 POWDER, FOR SOLUTION ORAL ONCE
Status: COMPLETED | OUTPATIENT
Start: 2021-04-08 | End: 2021-04-08

## 2021-04-08 RX ADMIN — Medication 10 ML: at 06:16

## 2021-04-08 RX ADMIN — POLYETHYLENE GLYCOL 3350 17 G: 17 POWDER, FOR SOLUTION ORAL at 12:13

## 2021-04-08 RX ADMIN — SERTRALINE 50 MG: 50 TABLET, FILM COATED ORAL at 09:15

## 2021-04-08 RX ADMIN — ACETAMINOPHEN 650 MG: 325 TABLET ORAL at 04:29

## 2021-04-08 RX ADMIN — Medication 1000 UNITS: at 09:15

## 2021-04-08 NOTE — DISCHARGE INSTRUCTIONS
Discharge Instructions       PATIENT ID: Isa Bo  MRN: [id number]   YOB: 1925    DATE OF ADMISSION: 4/6/2021  5:11 PM    DATE OF DISCHARGE: 4/8/2021    PRIMARY CARE PROVIDER: Marilyn Soria MD     ATTENDING PHYSICIAN: Colonel Dustin MD  DISCHARGING PROVIDER: Rena Butler NP    To contact this individual call [phone] and ask the  to page. If unavailable ask to be transferred the Adult Hospitalist Department. DISCHARGE DIAGNOSES Abdominal wall trauma due to fall     CONSULTATIONS: IP CONSULT TO GENERAL SURGERY    PROCEDURES/SURGERIES: * No surgery found *    PENDING TEST RESULTS:   At the time of discharge the following test results are still pending: None     FOLLOW UP APPOINTMENTS:   Follow-up Information     Follow up With Specialties Details Why Contact Info    Marilyn Soria MD Internal Medicine Schedule an appointment as soon as possible for a visit For follow up  [facility]  [phone]             ADDITIONAL CARE RECOMMENDATIONS:   Take medications as prescribed. Restart aspirin on 04/13/2021    DIET: Resume previous diet       ACTIVITY: Activity as tolerated    WOUND CARE: None     EQUIPMENT needed: None       DISCHARGE MEDICATIONS:   See Medication Reconciliation Form    · It is important that you take the medication exactly as they are prescribed. · Keep your medication in the bottles provided by the pharmacist and keep a list of the medication names, dosages, and times to be taken in your wallet. · Do not take other medications without consulting your doctor. NOTIFY YOUR PHYSICIAN FOR ANY OF THE FOLLOWING:   Fever over 101 degrees for 24 hours. Chest pain, shortness of breath, fever, chills, nausea, vomiting, diarrhea, change in mentation, falling, weakness, bleeding. Severe pain or pain not relieved by medications. Or, any other signs or symptoms that you may have questions about.       DISPOSITION:    Home With:   OT  PT  HH  RN      X SNF/Inpatient Rehab/LTAC    Independent/assisted living    Hospice    Other:     Signed:   Kayy Adams NP  4/8/2021  11:01 AM

## 2021-04-08 NOTE — PROGRESS NOTES
Problem: Falls - Risk of  Goal: *Absence of Falls  Description: Document Adelfo Krause Fall Risk and appropriate interventions in the flowsheet. Outcome: Resolved/Met     Problem: Patient Education: Go to Patient Education Activity  Goal: Patient/Family Education  Outcome: Resolved/Met     Problem: Risk for Spread of Infection  Goal: Prevent transmission of infectious organism to others  Description: Prevent the transmission of infectious organisms to other patients, staff members, and visitors.   Outcome: Resolved/Met     Problem: Patient Education:  Go to Education Activity  Goal: Patient/Family Education  Outcome: Resolved/Met

## 2021-04-08 NOTE — PROGRESS NOTES
SIMRAN:  RUR: 7%    Disposition:  Return to [facility]    Per IDR, CM informed of patient discharge planned for today. [facility] [phone]. CM coordinating S transport. NATHALIE Segovia, CRM  [phone]    11:34am  CM called Pat to advise of d/c ordered today. Patient to return to room 401 in memory care. Assigned nurse to call report to [phone].    11:45 CM called [facility] and spoke with  Matthew Thomas. Transport confirmed for 1 pm.      CM faxing demographic sheet to agency at [phone].     12:09p  CM spoke with patient's son Rusty Box) and advised of d/c today with 1pm transport. Discharge packet/PCS placed on hard chart.    Melissa Jones, [address] [facility], [address] [place] [address]

## 2021-04-08 NOTE — PROGRESS NOTES
Patient discharged via [facility]. IV access removed. Report called in to Flower Love, Director of Nursing, at [facility]. Questions and concerns addressed.

## 2021-04-08 NOTE — PROGRESS NOTES
3 [facility] at [address] [place] [address] Surgery        Subjective     Doing well, no acute issues    Objective     Patient Vitals for the past 24 hrs:   Temp Pulse Resp BP SpO2   04/08/21 0810 98.2 °F (36.8 °C) 94 18 (!) 158/62 95 %   04/08/21 0237 98.4 °F (36.9 °C) 72 18 137/65 98 %   04/07/21 2113 97.8 °F (36.6 °C) 67 18 (!) 154/68 100 %   04/07/21 1824 97.6 °F (36.4 °C) 84 17 (!) 167/66 98 %       Date 04/07/21 0700 - 04/08/21 0659 04/08/21 0700 - 04/09/21 0659   Shift 2320-9387 0387-9517 24 Hour Total 1527-8230 9477-9729 24 Hour Total   INTAKE   P.O. 240  240        P. O. 240  240      Shift Total(mL/kg) 240(4.3)  240(4.3)      OUTPUT   Urine(mL/kg/hr)           Urine Occurrence(s) 2 x 2 x 4 x      Shift Total(mL/kg)           240      Weight (kg) 55.8 55.8 55.8 55.8 55.8 55.8       PE  Pulm - CTAB  CV - RRR  Abd - soft, ND, BS present, RLQ hematoma still present but a little softer and a little less TTP    Labs  Recent Results (from the past 12 hour(s))   HGB & HCT    Collection Time: 04/07/21 10:22 PM   Result Value Ref Range    HGB 7.6 (L) 11.5 - 16.0 g/dL    HCT 25.2 (L) 35.0 - 47.0 %   HGB & HCT    Collection Time: 04/08/21  4:32 AM   Result Value Ref Range    HGB 8.0 (L) 11.5 - 16.0 g/dL    HCT 26.2 (L) 35.0 - 47.0 %         Assessment     Roz Carl is a 80 y. o.yr old female s/p abdominal wall hematoma after fall    Plan     Doing well  Hgb is stable this am  Having a little less pain  Hematoma on exam is the same  No surgical needs at this time  Hematoma will resolve on its own  We will sign off, please call if any issues or questions    Lynn Varela MD

## 2021-04-08 NOTE — DISCHARGE SUMMARY
Discharge Summary       PATIENT ID: Candelaria Valenzuela  MRN: [id number]   YOB: 1925    DATE OF ADMISSION: 4/6/2021  5:11 PM    DATE OF DISCHARGE: 04/08/2021  PRIMARY CARE PROVIDER: Elaine Newman MD     ATTENDING PHYSICIAN: Ryan Garcia MD  DISCHARGING PROVIDER: Erich Sweell NP    To contact this individual call [phone] and ask the  to page. If unavailable ask to be transferred the Adult Hospitalist Department. CONSULTATIONS: IP CONSULT TO GENERAL SURGERY    PROCEDURES/SURGERIES: * No surgery found *    ADMITTING DIAGNOSES & HOSPITAL COURSE:   Per H&P: Patricia Flores is a 89 y. o.   female who presents with above symptoms. Her medical history significant for dementia without behavioral disturbance, major depressive disorder, hypertension, heart failure, anemia, frequent fall, spinal stenosis, macular degeneration, ESBL UTI  Patient reports this evening was trying to come out of her bed.  Reached out to wheelchair. Gina Pap was not locked.  Patient unable to control her balance.  Fell on the floor.  Reports did not hit her head.  Not sure what hit her right side of the abdomen, but reports severe pain.  Denies nausea vomiting.  Reports was at her normal level of health prior to this fall.  Denies chest pain shortness of breath palpitation prior to this fall.  Denies recurrent fall. Upon arrival in the emergency room abdominal CT scan showed large subcutaneous hematoma, possibly extravasation from artery/vein, general surgery consulted.  Hospitalist will admit.  Surgery advised monitor and , no intervention indicated         DISCHARGE DIAGNOSES / PLAN:      Abdominal wall hematoma d/t ground level fall   - CT scan showsAn 8.4 x 3.9 x 5.5 cm subcutaneous hematoma, multiple small foci of active extravasation, which may be arterial or venous.  - General surgery consult- No surgical interventions recommended at this time  - Pain control   - Monitor hemoglobin     Anemia, likely seconday to above   - hgb 7.8 then 8.0 this morning   - monitor H&H  - transfuse for hgb <7.0      Abdominal pain   - Secondary to above   - Pain control   - Monitor      Hx of mood disorder   - Continue Zoloft      Hx of dementia   - Supportive treatment      Spoke with patient's son Shane Mcguire at bedside. Updated him on patient's care and discussed discharge planning. Questions answered. ADDITIONAL CARE RECOMMENDATIONS:   Take medications as prescribed. Restart aspirin on 04/13/2021    PENDING TEST RESULTS:   At the time of discharge the following test results are still pending: None     FOLLOW UP APPOINTMENTS:    Follow-up Information     Follow up With Specialties Details Why Contact Info    Rodríguez Barajas MD Internal Medicine Schedule an appointment as soon as possible for a visit For follow up  [address]  [phone]               DIET: Resume previous diet      ACTIVITY: Activity as tolerated    WOUND CARE: None     EQUIPMENT needed: None       DISCHARGE MEDICATIONS:  Current Discharge Medication List      CONTINUE these medications which have NOT CHANGED    Details   lidocaine (LIDODERM) 5 % Apply patch to the affected area for 12 hours a day and remove for 12 hours a day. Qty: 1 Each, Refills: 0      cyclobenzaprine (FLEXERIL) 10 mg tablet Take 0.5 Tabs by mouth two (2) times daily as needed for Muscle Spasm(s). Qty: 20 Tab, Refills: 0      sertraline (ZOLOFT) 50 mg tablet Take 50 mg by mouth daily. cholecalciferol (VITAMIN D3) 1,000 unit tablet Take 1,000 Units by mouth daily. polyethylene glycol (MIRALAX) 17 gram packet Take 17 g by mouth daily as needed. Indications: constipation      !! acetaminophen (TYLENOL) 500 mg tablet Take 500 mg by mouth two (2) times a day. Menthol-Zinc Oxide (CALMOSEPTINE) 0.44-20.6 % oint Apply  to affected area two (2) times daily as needed (red buttocks/barrier cream).       !! acetaminophen (TYLENOL) 325 mg tablet Take 650 mg by mouth every four (4) hours as needed for Pain. lactulose (ENULOSE) 10 gram/15 mL solution Take 10 g by mouth daily as needed. Indications: constipation      polyvinyl alcohol (LIQUIFILM TEARS) 1.4 % ophthalmic solution Administer 1 Drop to both eyes every four (4) hours as needed. Indications: Dry Eye      neomycin-bacitracnZn-polymyxnB (NEOSPORIN) 3.5-400-5,000 mg-unit-unit oipk ointment Apply 1 Packet to affected area as needed (in the event of skin tears or abrasions). hydrocortisone (PREPARATION H HYDROCORTISONE) 1 % topical cream Apply  to affected area daily as needed for Skin Irritation or Itching. use thin layer      estradiol (ESTRACE) 0.01 % (0.1 mg/gram) vaginal cream Insert 2 g into vagina every Monday, Wednesday, Friday. UTI ppx       !! - Potential duplicate medications found. Please discuss with provider. STOP taking these medications       ibuprofen (MOTRIN) 600 mg tablet Comments:   Reason for Stopping:         aspirin delayed-release 81 mg tablet Comments:   Reason for Stopping:                 NOTIFY YOUR PHYSICIAN FOR ANY OF THE FOLLOWING:   Fever over 101 degrees for 24 hours. Chest pain, shortness of breath, fever, chills, nausea, vomiting, diarrhea, change in mentation, falling, weakness, bleeding. Severe pain or pain not relieved by medications. Or, any other signs or symptoms that you may have questions about. DISPOSITION:    Home With:   OT  PT  HH  RN      X Long term SNF/Inpatient Rehab    Independent/assisted living    Hospice    Other:       PATIENT CONDITION AT DISCHARGE:     Functional status    Poor    X Deconditioned     Independent      Cognition     Lucid     Forgetful    X Dementia      Catheters/lines (plus indication)    Leong     PICC     PEG    X None      Code status     Full code    X DNR      PHYSICAL EXAMINATION AT DISCHARGE:  General:          Alert, cooperative, no distress, appears stated age.      HEENT:           Atraumatic, anicteric sclerae, pink conjunctivae                          No oral ulcers, mucosa moist, throat clear, dentition fair  Neck:               Supple, symmetrical  Lungs:             Clear to auscultation bilaterally. No Wheezing or Rhonchi. No rales. Chest wall:      No tenderness  No Accessory muscle use. Heart:              Regular  rhythm,  No  murmur   No edema  Abdomen:        Soft, non-tender. Not distended. Bowel sounds normal. Right lower quadrant bruising noted. Extremities:     No cyanosis. No clubbing,                            Skin turgor normal, Capillary refill normal  Skin:                Not pale. Not Jaundiced  No rashes   Psych:             Not anxious or agitated. Neurologic:      Alert, moves all extremities,   CHRONIC MEDICAL DIAGNOSES:  Problem List as of 4/8/2021 Date Reviewed: 6/21/2018          Codes Class Noted - Resolved    Abdominal wall hematoma ICD-10-CM: S30. 1XXA  ICD-9-CM: 922.2  4/6/2021 - Present        Back pain ICD-10-CM: M54.9  ICD-9-CM: 724.5  6/23/2018 - Present        RESOLVED: Dizziness ICD-10-CM: R42  ICD-9-CM: 780.4  6/21/2018 - 6/26/2018              Greater than 45 minutes were spent with the patient on counseling and coordination of care    Signed:   Ayesha Flor NP  4/8/2021  11:03 AM

## 2022-03-09 ENCOUNTER — APPOINTMENT (OUTPATIENT)
Dept: CT IMAGING | Age: 87
DRG: 103 | End: 2022-03-09
Attending: EMERGENCY MEDICINE
Payer: MEDICARE

## 2022-03-09 ENCOUNTER — APPOINTMENT (OUTPATIENT)
Dept: MRI IMAGING | Age: 87
DRG: 103 | End: 2022-03-09
Attending: FAMILY MEDICINE
Payer: MEDICARE

## 2022-03-09 ENCOUNTER — APPOINTMENT (OUTPATIENT)
Dept: GENERAL RADIOLOGY | Age: 87
DRG: 103 | End: 2022-03-09
Attending: EMERGENCY MEDICINE
Payer: MEDICARE

## 2022-03-09 ENCOUNTER — HOSPITAL ENCOUNTER (INPATIENT)
Age: 87
LOS: 1 days | Discharge: NURSING, REHAB OR LONG-TERM CARE | DRG: 103 | End: 2022-03-12
Attending: EMERGENCY MEDICINE | Admitting: INTERNAL MEDICINE
Payer: MEDICARE

## 2022-03-09 DIAGNOSIS — R20.0 NUMBNESS OF FACE: ICD-10-CM

## 2022-03-09 DIAGNOSIS — Q67.0 FACIAL ASYMMETRY: Primary | ICD-10-CM

## 2022-03-09 PROBLEM — I63.9 CVA (CEREBRAL VASCULAR ACCIDENT) (HCC): Status: ACTIVE | Noted: 2022-03-09

## 2022-03-09 LAB
ANION GAP SERPL CALC-SCNC: 4 MMOL/L (ref 5–15)
APPEARANCE UR: ABNORMAL
BACTERIA URNS QL MICRO: ABNORMAL /HPF
BASOPHILS # BLD: 0.1 K/UL (ref 0–0.1)
BASOPHILS NFR BLD: 1 % (ref 0–1)
BILIRUB UR QL: NEGATIVE
BNP SERPL-MCNC: 268 PG/ML
BUN SERPL-MCNC: 20 MG/DL (ref 6–20)
BUN/CREAT SERPL: 19 (ref 12–20)
CALCIUM SERPL-MCNC: 8.8 MG/DL (ref 8.5–10.1)
CHLORIDE SERPL-SCNC: 111 MMOL/L (ref 97–108)
CO2 SERPL-SCNC: 26 MMOL/L (ref 21–32)
COLOR UR: ABNORMAL
COMMENT, HOLDF: NORMAL
CREAT SERPL-MCNC: 1.07 MG/DL (ref 0.55–1.02)
DIFFERENTIAL METHOD BLD: NORMAL
EOSINOPHIL # BLD: 0.3 K/UL (ref 0–0.4)
EOSINOPHIL NFR BLD: 4 % (ref 0–7)
EPITH CASTS URNS QL MICRO: ABNORMAL /LPF
ERYTHROCYTE [DISTWIDTH] IN BLOOD BY AUTOMATED COUNT: 14.4 % (ref 11.5–14.5)
GLUCOSE SERPL-MCNC: 88 MG/DL (ref 65–100)
GLUCOSE UR STRIP.AUTO-MCNC: NEGATIVE MG/DL
HCT VFR BLD AUTO: 39.4 % (ref 35–47)
HGB BLD-MCNC: 12.5 G/DL (ref 11.5–16)
HGB UR QL STRIP: ABNORMAL
IMM GRANULOCYTES # BLD AUTO: 0 K/UL (ref 0–0.04)
IMM GRANULOCYTES NFR BLD AUTO: 0 % (ref 0–0.5)
INR PPP: 1.1 (ref 0.9–1.1)
KETONES UR QL STRIP.AUTO: NEGATIVE MG/DL
LEUKOCYTE ESTERASE UR QL STRIP.AUTO: ABNORMAL
LIPASE SERPL-CCNC: 100 U/L (ref 73–393)
LYMPHOCYTES # BLD: 2.2 K/UL (ref 0.8–3.5)
LYMPHOCYTES NFR BLD: 34 % (ref 12–49)
MAGNESIUM SERPL-MCNC: 2.5 MG/DL (ref 1.6–2.4)
MCH RBC QN AUTO: 29.7 PG (ref 26–34)
MCHC RBC AUTO-ENTMCNC: 31.7 G/DL (ref 30–36.5)
MCV RBC AUTO: 93.6 FL (ref 80–99)
MONOCYTES # BLD: 0.8 K/UL (ref 0–1)
MONOCYTES NFR BLD: 13 % (ref 5–13)
NEUTS SEG # BLD: 3.1 K/UL (ref 1.8–8)
NEUTS SEG NFR BLD: 48 % (ref 32–75)
NITRITE UR QL STRIP.AUTO: POSITIVE
NRBC # BLD: 0 K/UL (ref 0–0.01)
NRBC BLD-RTO: 0 PER 100 WBC
PH UR STRIP: 7.5 [PH] (ref 5–8)
PLATELET # BLD AUTO: 343 K/UL (ref 150–400)
PMV BLD AUTO: 9.1 FL (ref 8.9–12.9)
POTASSIUM SERPL-SCNC: 4.3 MMOL/L (ref 3.5–5.1)
PROT UR STRIP-MCNC: NEGATIVE MG/DL
PROTHROMBIN TIME: 11.4 SEC (ref 9–11.1)
RBC # BLD AUTO: 4.21 M/UL (ref 3.8–5.2)
RBC #/AREA URNS HPF: ABNORMAL /HPF (ref 0–5)
SAMPLES BEING HELD,HOLD: NORMAL
SODIUM SERPL-SCNC: 141 MMOL/L (ref 136–145)
SP GR UR REFRACTOMETRY: >1.03
TROPONIN-HIGH SENSITIVITY: 36 NG/L (ref 0–51)
TROPONIN-HIGH SENSITIVITY: 37 NG/L (ref 0–51)
TROPONIN-HIGH SENSITIVITY: 40 NG/L (ref 0–51)
UROBILINOGEN UR QL STRIP.AUTO: 0.2 EU/DL (ref 0.2–1)
WBC # BLD AUTO: 6.5 K/UL (ref 3.6–11)
WBC URNS QL MICRO: >100 /HPF (ref 0–4)

## 2022-03-09 PROCEDURE — 93005 ELECTROCARDIOGRAM TRACING: CPT

## 2022-03-09 PROCEDURE — 4A03X5D MEASUREMENT OF ARTERIAL FLOW, INTRACRANIAL, EXTERNAL APPROACH: ICD-10-PCS | Performed by: RADIOLOGY

## 2022-03-09 PROCEDURE — 74011000636 HC RX REV CODE- 636: Performed by: FAMILY MEDICINE

## 2022-03-09 PROCEDURE — 85610 PROTHROMBIN TIME: CPT

## 2022-03-09 PROCEDURE — 96375 TX/PRO/DX INJ NEW DRUG ADDON: CPT

## 2022-03-09 PROCEDURE — 99285 EMERGENCY DEPT VISIT HI MDM: CPT

## 2022-03-09 PROCEDURE — 70450 CT HEAD/BRAIN W/O DYE: CPT

## 2022-03-09 PROCEDURE — 74011250637 HC RX REV CODE- 250/637: Performed by: FAMILY MEDICINE

## 2022-03-09 PROCEDURE — 85025 COMPLETE CBC W/AUTO DIFF WBC: CPT

## 2022-03-09 PROCEDURE — 84484 ASSAY OF TROPONIN QUANT: CPT

## 2022-03-09 PROCEDURE — 70551 MRI BRAIN STEM W/O DYE: CPT

## 2022-03-09 PROCEDURE — 97535 SELF CARE MNGMENT TRAINING: CPT

## 2022-03-09 PROCEDURE — 97165 OT EVAL LOW COMPLEX 30 MIN: CPT

## 2022-03-09 PROCEDURE — 0042T CT CODE NEURO PERF W CBF: CPT

## 2022-03-09 PROCEDURE — 94760 N-INVAS EAR/PLS OXIMETRY 1: CPT

## 2022-03-09 PROCEDURE — 94761 N-INVAS EAR/PLS OXIMETRY MLT: CPT

## 2022-03-09 PROCEDURE — 74011000636 HC RX REV CODE- 636: Performed by: EMERGENCY MEDICINE

## 2022-03-09 PROCEDURE — 81001 URINALYSIS AUTO W/SCOPE: CPT

## 2022-03-09 PROCEDURE — 83690 ASSAY OF LIPASE: CPT

## 2022-03-09 PROCEDURE — 80048 BASIC METABOLIC PNL TOTAL CA: CPT

## 2022-03-09 PROCEDURE — 83880 ASSAY OF NATRIURETIC PEPTIDE: CPT

## 2022-03-09 PROCEDURE — 74011250637 HC RX REV CODE- 250/637: Performed by: EMERGENCY MEDICINE

## 2022-03-09 PROCEDURE — 83735 ASSAY OF MAGNESIUM: CPT

## 2022-03-09 PROCEDURE — G0378 HOSPITAL OBSERVATION PER HR: HCPCS

## 2022-03-09 PROCEDURE — 74011250636 HC RX REV CODE- 250/636: Performed by: FAMILY MEDICINE

## 2022-03-09 PROCEDURE — 96374 THER/PROPH/DIAG INJ IV PUSH: CPT

## 2022-03-09 PROCEDURE — 74177 CT ABD & PELVIS W/CONTRAST: CPT

## 2022-03-09 PROCEDURE — 74011000250 HC RX REV CODE- 250: Performed by: FAMILY MEDICINE

## 2022-03-09 PROCEDURE — 36415 COLL VENOUS BLD VENIPUNCTURE: CPT

## 2022-03-09 PROCEDURE — 70496 CT ANGIOGRAPHY HEAD: CPT

## 2022-03-09 PROCEDURE — 71045 X-RAY EXAM CHEST 1 VIEW: CPT

## 2022-03-09 RX ORDER — FUROSEMIDE 20 MG/1
20 TABLET ORAL DAILY
COMMUNITY

## 2022-03-09 RX ORDER — ACETAMINOPHEN 325 MG/1
650 TABLET ORAL
Status: DISCONTINUED | OUTPATIENT
Start: 2022-03-09 | End: 2022-03-10

## 2022-03-09 RX ORDER — ASPIRIN 300 MG/1
300 SUPPOSITORY RECTAL
Status: COMPLETED | OUTPATIENT
Start: 2022-03-09 | End: 2022-03-09

## 2022-03-09 RX ORDER — ATORVASTATIN CALCIUM 20 MG/1
80 TABLET, FILM COATED ORAL
Status: DISCONTINUED | OUTPATIENT
Start: 2022-03-09 | End: 2022-03-12 | Stop reason: HOSPADM

## 2022-03-09 RX ORDER — SODIUM CHLORIDE 9 MG/ML
75 INJECTION, SOLUTION INTRAVENOUS CONTINUOUS
Status: DISPENSED | OUTPATIENT
Start: 2022-03-09 | End: 2022-03-10

## 2022-03-09 RX ORDER — FLUCONAZOLE 2 MG/ML
200 INJECTION, SOLUTION INTRAVENOUS EVERY 24 HOURS
Status: DISCONTINUED | OUTPATIENT
Start: 2022-03-09 | End: 2022-03-12 | Stop reason: HOSPADM

## 2022-03-09 RX ORDER — METHENAMINE HIPPURATE 1000 MG/1
1 TABLET ORAL 2 TIMES DAILY WITH MEALS
COMMUNITY

## 2022-03-09 RX ORDER — POLYETHYLENE GLYCOL 3350 17 G/17G
17 POWDER, FOR SOLUTION ORAL
Status: DISCONTINUED | OUTPATIENT
Start: 2022-03-09 | End: 2022-03-12 | Stop reason: HOSPADM

## 2022-03-09 RX ORDER — DOCUSATE SODIUM 100 MG/1
100 CAPSULE, LIQUID FILLED ORAL
COMMUNITY

## 2022-03-09 RX ORDER — MICONAZOLE NITRATE 2 %
POWDER (GRAM) TOPICAL 2 TIMES DAILY
Status: DISCONTINUED | OUTPATIENT
Start: 2022-03-09 | End: 2022-03-12 | Stop reason: HOSPADM

## 2022-03-09 RX ORDER — GUAIFENESIN 100 MG/5ML
81 LIQUID (ML) ORAL DAILY
COMMUNITY

## 2022-03-09 RX ORDER — CHOLECALCIFEROL (VITAMIN D3) 125 MCG
5 CAPSULE ORAL
COMMUNITY

## 2022-03-09 RX ORDER — ACETAMINOPHEN 650 MG/1
650 SUPPOSITORY RECTAL
Status: DISCONTINUED | OUTPATIENT
Start: 2022-03-09 | End: 2022-03-10

## 2022-03-09 RX ORDER — ASPIRIN 300 MG/1
300 SUPPOSITORY RECTAL DAILY
Status: DISCONTINUED | OUTPATIENT
Start: 2022-03-10 | End: 2022-03-12 | Stop reason: HOSPADM

## 2022-03-09 RX ORDER — SENNOSIDES 8.6 MG/1
1 TABLET ORAL DAILY
COMMUNITY

## 2022-03-09 RX ORDER — SERTRALINE HYDROCHLORIDE 50 MG/1
50 TABLET, FILM COATED ORAL DAILY
Status: DISCONTINUED | OUTPATIENT
Start: 2022-03-10 | End: 2022-03-12 | Stop reason: HOSPADM

## 2022-03-09 RX ADMIN — ASPIRIN 300 MG: 300 SUPPOSITORY RECTAL at 11:58

## 2022-03-09 RX ADMIN — MICONAZOLE NITRATE 2 % TOPICAL POWDER: at 18:07

## 2022-03-09 RX ADMIN — IOPAMIDOL 20 ML: 755 INJECTION, SOLUTION INTRAVENOUS at 10:04

## 2022-03-09 RX ADMIN — ACETAMINOPHEN 650 MG: 650 SUPPOSITORY RECTAL at 18:07

## 2022-03-09 RX ADMIN — SODIUM CHLORIDE 75 ML/HR: 9 INJECTION, SOLUTION INTRAVENOUS at 14:19

## 2022-03-09 RX ADMIN — IOPAMIDOL 60 ML: 755 INJECTION, SOLUTION INTRAVENOUS at 12:12

## 2022-03-09 RX ADMIN — FLUCONAZOLE 200 MG: 2 INJECTION, SOLUTION INTRAVENOUS at 16:16

## 2022-03-09 RX ADMIN — IOPAMIDOL 100 ML: 755 INJECTION, SOLUTION INTRAVENOUS at 10:04

## 2022-03-09 RX ADMIN — FAMOTIDINE 20 MG: 10 INJECTION INTRAVENOUS at 16:29

## 2022-03-09 NOTE — CONSULTS
Neurology Consult  Refugio Rashid NP    Patient: North Christine MRN: [id number]  SSN: [id number]    YOB: 1925  Age: 80 y.o. Sex: female      Chief Complaint: Right facial droop    Subjective: North Christine is a 80 y.o. F with a pmh of HTN, macular degeneration and dementia who presented to [facility] ED via EMS with right sided facial droop and slurred speech from [facility]. Staff report they noted the facial droop at 0850 this am, however, it was unclear when the last time staff saw her without the facial droop so she was not a candidate for TPA. At baseline, she is oriented to person and place per staff at SNF, and she takes a daily baby ASA. A Code stroke was called and a stat CT head was obtained which was negative for acute process but did show postsurgical changes from right mastoidectomy and occipital craniectomy and a right right petrous apex meningioma. CTA head/neck showed severe stenosis/near occlusion of the M1 segment on the right with no perfusion deficit on CTP. NIS consulted and recommended medical management, no neurointervention indicated. She was admitted to the hospital for further stroke work up. Past Medical History:   Diagnosis Date    Alzheimer disease ([facility] [address]     Depressive episode     Dry eye syndrome     Hyperlipidemia     Macular degeneration     Muscle spasm     Primary hypertension     Spinal stenosis      History reviewed. No pertinent family history. Social History     Tobacco Use    Smoking status: Never Smoker    Smokeless tobacco: Never Used   Substance Use Topics    Alcohol use: No      Prior to Admission Medications   Prescriptions Last Dose Informant Patient Reported? Taking? Menthol-Zinc Oxide (CALMOSEPTINE) 0.44-20.6 % oint   Yes No   Sig: Apply  to affected area two (2) times daily as needed (red buttocks/barrier cream). acetaminophen (TYLENOL) 325 mg tablet   Yes No   Sig: Take 650 mg by mouth every four (4) hours as needed for Pain. acetaminophen (TYLENOL) 500 mg tablet   Yes No   Sig: Take 500 mg by mouth two (2) times a day. cholecalciferol (VITAMIN D3) 1,000 unit tablet   Yes No   Sig: Take 1,000 Units by mouth daily. cyclobenzaprine (FLEXERIL) 10 mg tablet   No No   Sig: Take 0.5 Tabs by mouth two (2) times daily as needed for Muscle Spasm(s). estradiol (ESTRACE) 0.01 % (0.1 mg/gram) vaginal cream   Yes No   Sig: Insert 2 g into vagina every Monday, Wednesday, Friday. UTI ppx   hydrocortisone (PREPARATION H HYDROCORTISONE) 1 % topical cream   Yes No   Sig: Apply  to affected area daily as needed for Skin Irritation or Itching. use thin layer   lactulose (ENULOSE) 10 gram/15 mL solution   Yes No   Sig: Take 10 g by mouth daily as needed. Indications: constipation   lidocaine (LIDODERM) 5 %   No No   Sig: Apply patch to the affected area for 12 hours a day and remove for 12 hours a day. neomycin-bacitracnZn-polymyxnB (NEOSPORIN) 3.5-400-5,000 mg-unit-unit oipk ointment   Yes No   Sig: Apply 1 Packet to affected area as needed (in the event of skin tears or abrasions). polyethylene glycol (MIRALAX) 17 gram packet   Yes No   Sig: Take 17 g by mouth daily as needed. Indications: constipation   polyvinyl alcohol (LIQUIFILM TEARS) 1.4 % ophthalmic solution   Yes No   Sig: Administer 1 Drop to both eyes every four (4) hours as needed. Indications: Dry Eye   sertraline (ZOLOFT) 50 mg tablet   Yes No   Sig: Take 50 mg by mouth daily. Facility-Administered Medications: None       Allergies   Allergen Reactions    Hydromorphone Unknown (comments)    Morphine-Naltrexone Unknown (comments)     Review of Systems:  Review of systems not obtained due to patient factors.     Objective:     Vitals:    03/09/22 1026 03/09/22 1030 03/09/22 1103 03/09/22 1133   BP: (!) 185/59 (!) 165/51 (!) 148/62 129/85   Pulse: 72 74 63 60   Resp: 12 18 15 13   Temp: 97.5 °F (36.4 °C)      SpO2: 97% 97%     Weight:          Physical Exam:  GENERAL: Calm, cooperative, NAD  SKIN: Warm, dry, color appropriate for ethnicity. Neurologic Exam:  Mental Status:  Alert and oriented x 2 (baseline). Language:    Speech slow and slurred    Cranial Nerves:   Pupils 3 mm, equal, round and reactive to light. Visual fields full to confrontation. Extraocular movements intact. Facial sensation diminished on right side. Mild right facial droop present. Mild dysarthria present     Shoulder shrug 5/5 bilaterally. Motor:    No pronator drift, generalized weakness and debility present. Bulk and tone normal.      No involuntary movements. Sensation:    Sensation intact throughout to light touch in arms and legs. Coordination & Gait: Gait deferred. Labs:  Lab Results   Component Value Date/Time    WBC 6.5 03/09/2022 10:02 AM    HGB 12.5 03/09/2022 10:02 AM    HCT 39.4 03/09/2022 10:02 AM    PLATELET 805 87/85/7436 10:02 AM    MCV 93.6 03/09/2022 10:02 AM      Lab Results   Component Value Date/Time    Sodium 141 03/09/2022 10:02 AM    Potassium 4.3 03/09/2022 10:02 AM    Chloride 111 (H) 03/09/2022 10:02 AM    CO2 26 03/09/2022 10:02 AM    Anion gap 4 (L) 03/09/2022 10:02 AM    Glucose 88 03/09/2022 10:02 AM    BUN 20 03/09/2022 10:02 AM    Creatinine 1.07 (H) 03/09/2022 10:02 AM    BUN/Creatinine ratio 19 03/09/2022 10:02 AM    GFR est AA 58 (L) 03/09/2022 10:02 AM    GFR est non-AA 48 (L) 03/09/2022 10:02 AM    Calcium 8.8 03/09/2022 10:02 AM     Lab Results   Component Value Date/Time    CK 83 06/21/2018 10:21 AM    CK - MB 2.4 06/21/2018 10:21 AM    CK-MB Index 2.9 (H) 06/21/2018 10:21 AM    Troponin-I, Qt. <0.05 06/21/2018 10:21 AM       Imaging:  CT Results (maximum last 3):   Results from Hospital Encounter encounter on 03/09/22    CT CODE NEURO PERF W CBF    Narrative  Clinical history: stroke  INDICATION:   stroke    COMPARISON:  None  CONTRAST: 100ml Isovue 370  TECHNIQUE:  CT dose reduction was achieved through use of a standardized protocol tailored  for this examination and automatic exposure control for dose modulation. Following the uneventful administration of Isovue-370 contrast material, axial  CT angiography of the head and neck was performed. Coronal and sagittal  reconstructions were obtained. 3D MAXIMAL INTENSITY PROJECTION reconstructed imaging of the cranial vasculature  in both the coronal and sagittal plane was performed. CTA perfusion imaging was also obtained. Reconstructions were created with color  coding of axial time to peak, axial blood volume, axial blood flow, axial mean  transit time and axial T Max. The study was analyzed by the Katya Wells. Algorithm  FINDINGS:  Retrodental pannus with mild to moderate canal stenosis at the foramen magnum. Moderate to severe canal stenosis at C5-6. Multilevel foraminal stenosis. No  acute fracture or dislocation. Vertebral vascular calcifications. Prior right  lateral occipital craniectomy. Mass lesion in the supra clinoid/petrous apex on  the right, stable, 2.3 x 1.6 cm likely meningioma. Prior right mastoidectomy. .  Periventricular and scattered hypodensities in the cerebral white matter. There  is sulcal and ventricular prominence. . No evidence of acute intracranial  hemorrhage. . Multilevel severe foraminal stenoses of the cervical spine. .  CTA NECK:  There is no pleural effusion or pneumothorax. There is aortic atherosclerotic  change and atherosclerotic change extending into the left subclavian artery. The  right vertebral artery is dominant. .  Atherosclerotic changes the origin of the  ICA on the right and on the left. .  There is  less than 20%stenosis in the right internal carotid artery utilizing  NASCET criteria. There is  less than 20%stenosis in the left internal carotid artery utilizing  NASCET criteria. CTA HEAD:  Right vertebral artery is tortuous but dominant. Mild atherosclerotic change of  the right vertebral artery.  The left vertebral artery terminates in PICA. There  is moderate to severe stenosis of the basilar artery. Atherosclerotic change of  the cavernous and supraclinoid ICAs. There is severe stenosis of the M1 segment  on the right without vessel occlusion. There is a posterior communicating artery  present on the right and on the left. There are A1 segments bilaterally. A2 and  A3 segments are patent. Diminished arborization of M2 and M3 vessels on the  right. . The internal carotid, anterior cerebral, and middle cerebral arteries  are patent. There is no flow-limiting intracranial stenosis. There is no  aneurysm. There are no sizable posterior communicating arteries. CT PERFUSION: No evidence of perfusion mismatch. No evidence of reversible  ischemia. Subtly asymmetric cerebral blood flow and cerebral blood volume on the  right in comparison to the left. R CBF<30% :0  Tmax >6s: 0    Impression  Severe stenosis/near occlusion of the M1 segment on the right. Subtle asymmetry in cerebral blood flow and blood volume on the right. No  reversible ischemia. There is no additional hemodynamically significant stenosis, aneurysm or  dissection identified. Moderate to severe chronic microvascular moderate chronic microvascular ischemic  change and moderate cerebral atrophy. 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 Multilevel severe canal and foraminal stenoses of the cervical spine. The findings were called to Dr. Kay Christensen on 3/9/2022 at 10:25 AM by Dr. Nita Chen. [facility] 130 CONT    Narrative  Clinical history: stroke  INDICATION:   stroke    COMPARISON:  None  CONTRAST: 100ml Isovue 370  TECHNIQUE:  CT dose reduction was achieved through use of a standardized protocol tailored  for this examination and automatic exposure control for dose modulation. Following the uneventful administration of Isovue-370 contrast material, axial  CT angiography of the head and neck was performed.   Coronal and sagittal  reconstructions were obtained. 3D MAXIMAL INTENSITY PROJECTION reconstructed imaging of the cranial vasculature  in both the coronal and sagittal plane was performed. CTA perfusion imaging was also obtained. Reconstructions were created with color  coding of axial time to peak, axial blood volume, axial blood flow, axial mean  transit time and axial T Max. The study was analyzed by the Katya Wells. Algorithm  FINDINGS:  Retrodental pannus with mild to moderate canal stenosis at the foramen magnum. Moderate to severe canal stenosis at C5-6. Multilevel foraminal stenosis. No  acute fracture or dislocation. Vertebral vascular calcifications. Prior right  lateral occipital craniectomy. Mass lesion in the supra clinoid/petrous apex on  the right, stable, 2.3 x 1.6 cm likely meningioma. Prior right mastoidectomy. .  Periventricular and scattered hypodensities in the cerebral white matter. There  is sulcal and ventricular prominence. . No evidence of acute intracranial  hemorrhage. . Multilevel severe foraminal stenoses of the cervical spine. .  CTA NECK:  There is no pleural effusion or pneumothorax. There is aortic atherosclerotic  change and atherosclerotic change extending into the left subclavian artery. The  right vertebral artery is dominant. .  Atherosclerotic changes the origin of the  ICA on the right and on the left. .  There is  less than 20%stenosis in the right internal carotid artery utilizing  NASCET criteria. There is  less than 20%stenosis in the left internal carotid artery utilizing  NASCET criteria. CTA HEAD:  Right vertebral artery is tortuous but dominant. Mild atherosclerotic change of  the right vertebral artery. The left vertebral artery terminates in PICA. There  is moderate to severe stenosis of the basilar artery. Atherosclerotic change of  the cavernous and supraclinoid ICAs. There is severe stenosis of the M1 segment  on the right without vessel occlusion.  There is a posterior communicating artery  present on the right and on the left. There are A1 segments bilaterally. A2 and  A3 segments are patent. Diminished arborization of M2 and M3 vessels on the  right. . The internal carotid, anterior cerebral, and middle cerebral arteries  are patent. There is no flow-limiting intracranial stenosis. There is no  aneurysm. There are no sizable posterior communicating arteries. CT PERFUSION: No evidence of perfusion mismatch. No evidence of reversible  ischemia. Subtly asymmetric cerebral blood flow and cerebral blood volume on the  right in comparison to the left. R CBF<30% :0  Tmax >6s: 0    Impression  Severe stenosis/near occlusion of the M1 segment on the right. Subtle asymmetry in cerebral blood flow and blood volume on the right. No  reversible ischemia. There is no additional hemodynamically significant stenosis, aneurysm or  dissection identified. Moderate to severe chronic microvascular moderate chronic microvascular ischemic  change and moderate cerebral atrophy. 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 Multilevel severe canal and foraminal stenoses of the cervical spine. The findings were called to Dr. Niharika Orozco on 3/9/2022 at 10:25 AM by Dr. Geeta Shay. 1      CT CODE NEURO HEAD WO CONTRAST    Narrative  EXAM: CT CODE NEURO HEAD WO CONTRAST    INDICATION: Stroke    COMPARISON: June 2018 and September 2020. CONTRAST: None. TECHNIQUE: Unenhanced CT of the head was performed using 5 mm images. Brain and  bone windows were generated. Coronal and sagittal reformats. CT dose reduction  was achieved through use of a standardized protocol tailored for this  examination and automatic exposure control for dose modulation. FINDINGS:  Postsurgical changes from right mastoidectomy and occipital craniectomy. Meningioma at the right petrous apex is unchanged from prior imaging.  Mild  periventricular white matter hypodensities indicative of microvascular ischemic  disease. Ventricles and sulci are normal in size and configuration. No  intra-axial or extra-axial hemorrhage. Paranasal sinuses are clear. Impression  1. No acute intracranial abnormality. 2.  Postsurgical changes from right mastoidectomy and occipital craniectomy. 3.  Right petrous apex meningioma. 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 Moisés Briggs, medical management - no neurointervention indicated    I have discussed the diagnosis and the intended plan as seen in the above orders with Dr. Joe Ortiz. Thank you for this consult and participating in the care of this patient.   Signed By: Gabriela Bravo NP     March 9, 2022

## 2022-03-09 NOTE — PROGRESS NOTES
Problem: Pressure Injury - Risk of  Goal: *Prevention of pressure injury  Description: Document Franklyn Scale and appropriate interventions in the flowsheet. Outcome: Progressing Towards Goal  Note: Pressure Injury Interventions:  Sensory Interventions: Assess changes in LOC,Discuss PT/OT consult with provider,Check visual cues for pain,Float heels,Keep linens dry and wrinkle-free,Minimize linen layers,Pad between skin to skin    Moisture Interventions: Absorbent underpads,Internal/External urinary devices,Minimize layers,Moisture barrier,Limit adult briefs    Activity Interventions: Increase time out of bed,Pressure redistribution bed/mattress(bed type),PT/OT evaluation    Mobility Interventions: HOB 30 degrees or less,Pressure redistribution bed/mattress (bed type),PT/OT evaluation,Turn and reposition approx.  every two hours(pillow and wedges),Float heels    Nutrition Interventions: Document food/fluid/supplement intake    Friction and Shear Interventions: Minimize layers,HOB 30 degrees or less,Apply protective barrier, creams and emollients                Problem: Patient Education: Go to Patient Education Activity  Goal: Patient/Family Education  Outcome: Progressing Towards Goal     Problem: Patient Education: Go to Patient Education Activity  Goal: Patient/Family Education  Outcome: Progressing Towards Goal     Problem: Patient Education: Go to Patient Education Activity  Goal: Patient/Family Education  Outcome: Progressing Towards Goal     Problem: TIA/CVA Stroke: 0-24 hours  Goal: Off Pathway (Use only if patient is Off Pathway)  Outcome: Progressing Towards Goal  Goal: Activity/Safety  Outcome: Progressing Towards Goal  Goal: Consults, if ordered  Outcome: Progressing Towards Goal  Goal: Diagnostic Test/Procedures  Outcome: Progressing Towards Goal  Goal: Nutrition/Diet  Outcome: Progressing Towards Goal  Goal: Discharge Planning  Outcome: Progressing Towards Goal  Goal: Medications  Outcome: Progressing Towards Goal  Goal: Respiratory  Outcome: Progressing Towards Goal  Goal: Treatments/Interventions/Procedures  Outcome: Progressing Towards Goal  Goal: Minimize risk of bleeding post-thrombolytic infusion  Outcome: Progressing Towards Goal  Goal: Monitor for complications post-thrombolytic infusion  Outcome: Progressing Towards Goal  Goal: Psychosocial  Outcome: Progressing Towards Goal  Goal: *Hemodynamically stable  Outcome: Progressing Towards Goal  Goal: *Neurologically stable  Description: Absence of additional neurological deficits    Outcome: Progressing Towards Goal  Goal: *Verbalizes anxiety and depression are reduced or absent  Outcome: Progressing Towards Goal  Goal: *Absence of Signs of Aspiration on Current Diet  Outcome: Progressing Towards Goal  Goal: *Absence of deep venous thrombosis signs and symptoms(Stroke Metric)  Outcome: Progressing Towards Goal  Goal: *Ability to perform ADLs and demonstrates progressive mobility and function  Outcome: Progressing Towards Goal  Goal: *Stroke education started(Stroke Metric)  Outcome: Progressing Towards Goal  Goal: *Dysphagia screen performed(Stroke Metric)  Outcome: Progressing Towards Goal  Goal: *Rehab consulted(Stroke Metric)  Outcome: Progressing Towards Goal     Problem: Ischemic Stroke: Discharge Outcomes  Goal: *Verbalizes anxiety and depression are reduced or absent  Outcome: Progressing Towards Goal  Goal: *Verbalize understanding of risk factor modification(Stroke Metric)  Outcome: Progressing Towards Goal  Goal: *Hemodynamically stable  Outcome: Progressing Towards Goal  Goal: *Absence of aspiration pneumonia  Outcome: Progressing Towards Goal  Goal: *Aware of needed dietary changes  Outcome: Progressing Towards Goal  Goal: *Verbalize understanding of prescribed medications including anti-coagulants, anti-lipid, and/or anti-platelets(Stroke Metric)  Outcome: Progressing Towards Goal  Goal: *Tolerating diet  Outcome: Progressing Towards Goal  Goal: *Aware of follow-up diagnostics related to anticoagulants  Outcome: Progressing Towards Goal  Goal: *Ability to perform ADLs and demonstrates progressive mobility and function  Outcome: Progressing Towards Goal  Goal: *Absence of DVT(Stroke Metric)  Outcome: Progressing Towards Goal  Goal: *Absence of aspiration  Outcome: Progressing Towards Goal  Goal: *Optimal pain control at patient's stated goal  Outcome: Progressing Towards Goal  Goal: *Home safety concerns addressed  Outcome: Progressing Towards Goal  Goal: *Describes available resources and support systems  Outcome: Progressing Towards Goal  Goal: *Verbalizes understanding of activation of EMS(911) for stroke symptoms(Stroke Metric)  Outcome: Progressing Towards Goal  Goal: *Understands and describes signs and symptoms to report to providers(Stroke Metric)  Outcome: Progressing Towards Goal  Goal: *Neurolgocially stable (absence of additional neurological deficits)  Outcome: Progressing Towards Goal  Goal: *Verbalizes importance of follow-up with primary care physician(Stroke Metric)  Outcome: Progressing Towards Goal  Goal: *Smoking cessation discussed,if applicable(Stroke Metric)  Outcome: Progressing Towards Goal  Goal: *Depression screening completed(Stroke Metric)  Outcome: Progressing Towards Goal     Problem: Pain  Goal: *Control of Pain  Outcome: Progressing Towards Goal     Problem: Patient Education: Go to Patient Education Activity  Goal: Patient/Family Education  Outcome: Progressing Towards Goal     Problem: Falls - Risk of  Goal: *Absence of Falls  Description: Document Cesar Fall Risk and appropriate interventions in the flowsheet.   Outcome: Progressing Towards Goal  Note: Fall Risk Interventions:  Mobility Interventions: Bed/chair exit alarm,Communicate number of staff needed for ambulation/transfer,OT consult for ADLs,Patient to call before getting OOB,PT Consult for mobility concerns,PT Consult for assist device competence,Utilize walker, cane, or other assistive device    Mentation Interventions: Adequate sleep, hydration, pain control,Bed/chair exit alarm,Door open when patient unattended,Evaluate medications/consider consulting pharmacy,Increase mobility,More frequent rounding,Reorient patient,Room close to nurse's station    Medication Interventions: Assess postural VS orthostatic hypotension,Bed/chair exit alarm,Evaluate medications/consider consulting pharmacy,Patient to call before getting OOB,Teach patient to arise slowly    Elimination Interventions: Bed/chair exit alarm,Call light in reach,Patient to call for help with toileting needs,Stay With Me (per policy),Toilet paper/wipes in reach,Toileting schedule/hourly rounds              Problem: Patient Education: Go to Patient Education Activity  Goal: Patient/Family Education  Outcome: Progressing Towards Goal

## 2022-03-09 NOTE — ROUTINE PROCESS
TRANSFER - OUT REPORT:    Verbal report given to [facility], RN(name) on Tee Garcias  being transferred to [facility](unit) for routine progression of care       Report consisted of patients Situation, Background, Assessment and   Recommendations(SBAR). Information from the following report(s) SBAR, Kardex, ED Summary and MAR was reviewed with the receiving nurse. Lines:   Peripheral IV 03/09/22 Right Antecubital (Active)   Site Assessment Clean, dry, & intact 03/09/22 1010   Phlebitis Assessment 0 03/09/22 1010   Infiltration Assessment 0 03/09/22 1010   Dressing Status Clean, dry, & intact 03/09/22 1010   Dressing Type Transparent 03/09/22 1010   Hub Color/Line Status Pink;Flushed;Patent 03/09/22 1010        Opportunity for questions and clarification was provided.       Patient transported with:   [facility]

## 2022-03-09 NOTE — ED PROVIDER NOTES
Please note that this dictation was completed with [email or web address], the computer voice recognition software.  Quite often unanticipated grammatical, syntax, homophones, and other interpretive errors are inadvertently transcribed by the computer software.  Please disregard these errors.  Please excuse any errors that have escaped final proofreading. 59-year-old female past medical history remarkable for Alzheimer's, depression, dry eye syndrome, hyperlipidemia, macular degeneration, muscle spasms, primary hypertension, and spinal stenosis presents to the ER via EMS as \"code stroke level 1. \"  Code stroke level 1 was called from the field by EMS reported that the patient was seen approximately an hour and 15 minutes ago noted to have developed a facial droop and right-sided facial numbness during that time. Upon further investigation upon arrival turns out they are not sure when her last known time well was but noticed the facial droop earlier this morning. At this point we will change the code stroke level 1 to level 2. Pt is demented;            Past Medical History:   Diagnosis Date    Alzheimer disease ([facility] [address]     Depressive episode     Dry eye syndrome     Hyperlipidemia     Macular degeneration     Muscle spasm     Primary hypertension     Spinal stenosis        No past surgical history on file. No family history on file.     Social History     Socioeconomic History    Marital status:      Spouse name: Not on file    Number of children: Not on file    Years of education: Not on file    Highest education level: Not on file   Occupational History    Not on file   Tobacco Use    Smoking status: Never Smoker    Smokeless tobacco: Never Used   Substance and Sexual Activity    Alcohol use: No    Drug use: No    Sexual activity: Not on file   Other Topics Concern    Not on file   Social History Narrative    Not on file     Social Determinants of Health     Financial Resource Strain:    Stephania Miller Difficulty of Paying Living Expenses: Not on file   Food Insecurity:     Worried About Running Out of Food in the Last Year: Not on file    Ran Out of Food in the Last Year: Not on file   Transportation Needs:     Lack of Transportation (Medical): Not on file    Lack of Transportation (Non-Medical): Not on file   Physical Activity:     Days of Exercise per Week: Not on file    Minutes of Exercise per Session: Not on file   Stress:     Feeling of Stress : Not on file   Social Connections:     Frequency of Communication with Friends and Family: Not on file    Frequency of Social Gatherings with Friends and Family: Not on file    Attends [employer] Services: Not on file    Active Member of [address] [place] [address] or Organizations: Not on file    Attends Club or Organization Meetings: Not on file    Marital Status: Not on file   Intimate Partner Violence:     Fear of Current or Ex-Partner: Not on file    Emotionally Abused: Not on file    Physically Abused: Not on file    Sexually Abused: Not on file   Housing Stability:     Unable to Pay for Housing in the Last Year: Not on file    Number of [place] in the Last Year: Not on file    Unstable Housing in the Last Year: Not on file         ALLERGIES: Hydromorphone and Morphine-naltrexone    Review of Systems   Unable to perform ROS: Dementia   Constitutional: Negative for chills. HENT: Negative for drooling, rhinorrhea, sore throat, trouble swallowing and voice change. Eyes: Negative for pain. Respiratory: Negative for cough, chest tightness and shortness of breath. Cardiovascular: Negative for chest pain, palpitations and leg swelling. Gastrointestinal: Negative for abdominal pain, diarrhea, nausea and vomiting. Genitourinary: Negative for dysuria. Musculoskeletal: Negative for back pain. Skin: Negative for rash. Neurological: Positive for facial asymmetry and numbness. Negative for speech difficulty.    Psychiatric/Behavioral: Negative for confusion. All other systems reviewed and are negative. Vitals:    03/09/22 1430 03/09/22 1435 03/09/22 1800 03/09/22 2000   BP: (!) 184/62 (!) 175/58 (!) 179/61    Pulse:   (!) 55 62   Resp:   10    Temp:   98.2 °F (36.8 °C)    SpO2:       Weight:       Height:   5' (1.524 m)             Physical Exam  Vitals and nursing note reviewed. Constitutional:       General: She is not in acute distress. Appearance: Normal appearance. She is well-developed. She is not ill-appearing, toxic-appearing or diaphoretic. Comments: NAD, AxOx1 speaking in complete sentences    gcs = 14ish      HENT:      Head: Normocephalic. Comments: Cn intact grossly    ? R facial droop/ no teeth/ ? Dysarthria vs no teeth     Right Ear: External ear normal.      Left Ear: External ear normal.   Eyes:      General: No scleral icterus. Right eye: No discharge. Conjunctiva/sclera: Conjunctivae normal.      Pupils: Pupils are equal, round, and reactive to light. Neck:      Vascular: No JVD. Trachea: No tracheal deviation. Cardiovascular:      Rate and Rhythm: Normal rate and regular rhythm. Pulses: Normal pulses. Heart sounds: Normal heart sounds. No murmur heard. No friction rub. No gallop. Pulmonary:      Effort: Pulmonary effort is normal. No respiratory distress. Breath sounds: Normal breath sounds. No stridor. No wheezing, rhonchi or rales. Chest:      Chest wall: No tenderness. Abdominal:      General: Bowel sounds are normal.      Palpations: Abdomen is soft. Tenderness: There is no abdominal tenderness. There is no guarding or rebound. Hernia: No hernia is present. Genitourinary:     Comments: Pt denies urinary/ vaginal complaints  Musculoskeletal:         General: No swelling, tenderness, deformity or signs of injury. Normal range of motion. Cervical back: Normal range of motion and neck supple. Right lower leg: No edema. Left lower leg: No edema. Skin:     General: Skin is warm and dry. Capillary Refill: Capillary refill takes less than 2 seconds. Coloration: Skin is not jaundiced or pale. Findings: No bruising, erythema, lesion or rash. Comments: Noted rash (fungal) under bilat breasts/ R > L;    Neurological:      General: No focal deficit present. Mental Status: She is alert. Cranial Nerves: No cranial nerve deficit. Sensory: Sensory deficit present. Motor: Weakness present. No abnormal muscle tone. Coordination: Coordination normal.      Gait: Gait normal.      Deep Tendon Reflexes: Reflexes normal.      Comments: pt has motor/ CV/ Sensation grossly intact to all extremities, R = L in strength;     R =  L;    Psychiatric:         Behavior: Behavior normal.         Thought Content: Thought content normal.          MDM       Procedures    No chief complaint on file. 9:59 AM  The patients presenting problems have been discussed, and they are in agreement with the care plan formulated and outlined with them. I have encouraged them to ask questions as they arise throughout their visit. MEDICATIONS GIVEN:  Medications   iopamidoL (ISOVUE-370) 76 % injection 100 mL (has no administration in time range)   iopamidoL (ISOVUE-370) 76 % injection 20 mL (has no administration in time range)       LABS REVIEWED:  Labs Reviewed   METABOLIC PANEL, BASIC   CBC WITH AUTOMATED DIFF   PROTHROMBIN TIME + INR   TROPONIN-HIGH SENSITIVITY   MAGNESIUM   NT-PRO BNP       RADIOLOGY RESULTS:  The following have been ordered and reviewed:  _____________________________________________________________________  _____________________________________________________________________    EKG interpretation:   Rhythm: normal sinus rhythm; and regular .  Rate (approx.): 63; Axis: normal; P wave: normal; QRS interval: normal ; ST/T wave: normal; Negative acute significant segmental elevations/ compared to study dated 06/21/2018    PROCEDURES:        CONSULTATIONS:       PROGRESS NOTES:      DIAGNOSIS:    1. Facial asymmetry    2. Numbness of face        PLAN:  1-code S/ admit/ monitor/ no TPA      ED COURSE: The patients hospital course has been uncomplicated. CONSULT  NOTE  9:59 AM  Jody Rene MD spoke with Dr Philomena Harrison. Specialty: Teleneurology; Discussed pt's hx, disposition, and available diagnostic and imaging results. Reviewed care plans. Consulting physician agrees with plans as outlined 'order all scans/ I will see her after  . 10:27 AM  Called by Dr. Spencer Mg with radiology, \"right M1 severe stenosis/possible ischemia area. \"    11:00 AM  Notified that Dr. Adwoa Alvarez has reviewed the images decided no further intervention necessary; Perfect Serve Consult for Admission  11:03 AM    ED Room Number: ER30/30  Patient Name and age: Micael Simmonds 80 y.o.  female  Working Diagnosis:   1. Facial asymmetry    2. Numbness of face        COVID-19 Suspicion:  no  Sepsis present:  no  Reassessment needed: yes  Code Status:  Full Code  Readmission: no  Isolation Requirements:  no  Recommended Level of Care:  telemetry  Department:[facility] Adult ED - 21   Other:  Code S level 2/ needs plavix/ asa maximized per NI     11:12 AM  Dr Philomena Harrison called back; recommend rectal aspirin neurology consult MRI admission for further evaluation. \" will add abd ct due Pt reports of  pain;

## 2022-03-09 NOTE — ED TRIAGE NOTES
Pt arrives via EMS from [facility]. Nurse noted at 0850 slurred speech and right facial droop. LKW before bed. EMS reports . R 20, HR 75, /85, 97% RA. 20g L AC.

## 2022-03-09 NOTE — ED NOTES
Bedside and Verbal shift change report given to Rayne COX  (oncoming nurse) by Kalpesh Nash (offgoing nurse). Report included the following information SBAR, ED Summary, MAR and Recent Results.

## 2022-03-09 NOTE — PROGRESS NOTES
Pharmacy Renal Dosing Note    Famotidine ordered 20 mg every 12 hours has been changed to 20 mg every 24 hours for CrCl < 50 mL/min per P&T approved pharmacy protocol. Please contact inpatient pharmacy at x [phone] with any questions. Thank you.

## 2022-03-09 NOTE — PROGRESS NOTES
Neurocritical Care Code Stroke Documentation      Symptoms:   Right sided facial droop and slurred speech. Dementia at baseline is only oriented to person and place per staff at [facility]. Last Known Well:  Unknown, initially reported at [phone], however, that is the time which staff found her. Unknown when she was last seen without the facial droop   Medical hx: Past Medical History:   Diagnosis Date    Alzheimer disease ([facility] [address]     Depressive episode     Dry eye syndrome     Hyperlipidemia     Macular degeneration     Muscle spasm     Primary hypertension     Spinal stenosis       Anticoagulation:  None but is on ASA   VAN:   Negative   NIHSS:   1a-LOC:0    1b-Month/Age:1 (baseline)    1c-Open/Close Hand:0    2-Best Gaze:0    3-Visual Fields:0    4-Facial Palsy:1    5a-Left Arm:0    5b-Right Arm:0    6a-Left Le    6b-Right Le    7-Limb Ataxia:0    8-Sensory:0    9-Best Language:0    10-Dysarthria:1    11-Extinction/Inattention:0  TOTAL SCORE:3   Imaging:   CT head 1. No acute intracranial abnormality. 2.  Postsurgical changes from right mastoidectomy and occipital craniectomy. 3.  Right petrous apex meningioma. CTA delay due to IV no longer working. New line established and images obtained. Severe stenosis/near occlusion of the M1 segment on the right. CTP no perfusion deficit. Plan:   TPA Candidate: NO, out of window    Mechanical thrombectomy Candidate: NO     *Perform dysphagia screening prior to any PO intake*    Discussed with: Dr. Phoenix Tsai and discussed stenosis with Dr. Lyle Ann, no neurointervention indicated. Arrival time: 5865  Time spent: 30 minutes.      Alvaro Valerio NP  Neurocritical Care Nurse Practitioner  [phone]

## 2022-03-09 NOTE — H&P
[address] [facility] Adult  Hospitalist Group  History and Physical    Date of Service:  3/9/2022  Primary Care Provider: Katalina Wilkins MD  Source of information: Chart review    Chief Complaint: Facial Droop and Dysarthria      History of Presenting Illness: Nhung Orta is a 80 y.o. female who presents with facial droop and right-sided facial numbness    Not much history could be obtained from the patient, history is primary obtained from chart review    Patient with history of dementia, not able to provide much history, I spoke to patient's son Nicole Munoz he reports that patient has history of dementia, has \"good days and bad days\", reports that usually she is not able to maintain a conversation, reports that she has history of recurrent UTIs, usually representing symptom is hallucinations, reports that patient is on a suppressive antibiotic regimen for recurrent UTIs, currently patient is resting in bed and does not appear to be in any acute distress, no further history could be obtained from the patient         REVIEW OF SYSTEMS:  Pertinent items are noted in the History of Present Illness. Past Medical History:   Diagnosis Date    Alzheimer disease ([facility] [address]     Depressive episode     Dry eye syndrome     Hyperlipidemia     Macular degeneration     Muscle spasm     Primary hypertension     Spinal stenosis       No past surgical history on file. Prior to Admission medications    Medication Sig Start Date End Date Taking? Authorizing Provider   lidocaine (LIDODERM) 5 % Apply patch to the affected area for 12 hours a day and remove for 12 hours a day. 6/27/18   Ernestina Dang MD   cyclobenzaprine (FLEXERIL) 10 mg tablet Take 0.5 Tabs by mouth two (2) times daily as needed for Muscle Spasm(s). 6/26/18   Ernestina Dang MD   sertraline (ZOLOFT) 50 mg tablet Take 50 mg by mouth daily. Provider, Historical   cholecalciferol (VITAMIN D3) 1,000 unit tablet Take 1,000 Units by mouth daily. Provider, Historical   polyethylene glycol (MIRALAX) 17 gram packet Take 17 g by mouth daily as needed. Indications: constipation    Provider, Historical   acetaminophen (TYLENOL) 500 mg tablet Take 500 mg by mouth two (2) times a day. Provider, Historical   Menthol-Zinc Oxide (CALMOSEPTINE) 0.44-20.6 % oint Apply  to affected area two (2) times daily as needed (red buttocks/barrier cream). Provider, Historical   acetaminophen (TYLENOL) 325 mg tablet Take 650 mg by mouth every four (4) hours as needed for Pain. Provider, Historical   lactulose (ENULOSE) 10 gram/15 mL solution Take 10 g by mouth daily as needed. Indications: constipation    Provider, Historical   polyvinyl alcohol (LIQUIFILM TEARS) 1.4 % ophthalmic solution Administer 1 Drop to both eyes every four (4) hours as needed. Indications: Dry Eye    Provider, Historical   neomycin-bacitracnZn-polymyxnB (NEOSPORIN) 3.5-400-5,000 mg-unit-unit oipk ointment Apply 1 Packet to affected area as needed (in the event of skin tears or abrasions). Provider, Historical   hydrocortisone (PREPARATION H HYDROCORTISONE) 1 % topical cream Apply  to affected area daily as needed for Skin Irritation or Itching. use thin layer    Provider, Historical   estradiol (ESTRACE) 0.01 % (0.1 mg/gram) vaginal cream Insert 2 g into vagina every Monday, Wednesday, Friday. UTI ppx    Provider, Historical     Allergies   Allergen Reactions    Hydromorphone Unknown (comments)    Morphine-Naltrexone Unknown (comments)      History reviewed. No pertinent family history. Social History:  reports that she has never smoked. She has never used smokeless tobacco. She reports that she does not drink alcohol and does not use drugs. Family and social history were personally reviewed, all pertinent and relevant details are outlined as above.     Objective:     Visit Vitals  /85 (BP 1 Location: Left upper arm, BP Patient Position: At rest)   Pulse 60   Temp 97.5 °F (36.4 °C) Resp 13   Wt 58.7 kg (129 lb 6.6 oz)   SpO2 97%   BMI 23.67 kg/m²      O2 Device: None (Room air)    PHYSICAL EXAM:   General: Awake, does not appear to be in acute distress  HEENT: PEERL, moist mucus membranes  Neck: Supple,   Chest: Decreased basal breath sound  CVS: S1-S2 heard  Abd: Soft, non-tender, non-distended, +bowel sounds   Ext: No clubbing, no cyanosis, no edema  Neuro/Psych: Limited exam, corneal and gag reflex present, moves all 4, strength could not be tested but appears to be symmetrical, sensory appears to be grossly within normal limits  Cap refill: Brisk, less than 3 seconds  Pulses: 2+, symmetric in all extremities  Skin: Intertrigo in the breast fold    Data Review: All diagnostic labs and studies have been reviewed. Abnormal Labs Reviewed   METABOLIC PANEL, BASIC - Abnormal; Notable for the following components:       Result Value    Chloride 111 (*)     Anion gap 4 (*)     Creatinine 1.07 (*)     GFR est AA 58 (*)     GFR est non-AA 48 (*)     All other components within normal limits   PROTHROMBIN TIME + INR - Abnormal; Notable for the following components:    Prothrombin time 11.4 (*)     All other components within normal limits   MAGNESIUM - Abnormal; Notable for the following components:    Magnesium 2.5 (*)     All other components within normal limits       All Micro Results     None          IMAGING:   XR CHEST PORT   Final Result   No acute process. CTA CODE NEURO HEAD AND NECK W CONT   Final Result   Severe stenosis/near occlusion of the M1 segment on the right. Subtle asymmetry in cerebral blood flow and blood volume on the right. No   reversible ischemia. There is no additional hemodynamically significant stenosis, aneurysm or   dissection identified. Moderate to severe chronic microvascular moderate chronic microvascular ischemic   change and moderate cerebral atrophy. 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       Multilevel severe canal and foraminal stenoses of the cervical spine. The findings were called to Dr. Maria Alejandra Alvarez on 3/9/2022 at 10:25 AM by Dr. Sam Franz. [id number]      CT CODE NEURO PERF W CBF   Final Result   Severe stenosis/near occlusion of the M1 segment on the right. Subtle asymmetry in cerebral blood flow and blood volume on the right. No   reversible ischemia. There is no additional hemodynamically significant stenosis, aneurysm or   dissection identified. Moderate to severe chronic microvascular moderate chronic microvascular ischemic   change and moderate cerebral atrophy. 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 Multilevel severe canal and foraminal stenoses of the cervical spine. The findings were called to Dr. Maria Alejandra Alvarez on 3/9/2022 at 10:25 AM by Dr. Sam Franz. [address] [facility]   Final Result   1. No acute intracranial abnormality. 2.  Postsurgical changes from right mastoidectomy and occipital craniectomy. 3.  Right petrous apex meningioma. CT ABD PELV W CONT    (Results Pending)   MRI BRAIN WO CONT    (Results Pending)        ECG/ECHO:    Results for orders placed or performed during the hospital encounter of 03/09/22   EKG, 12 LEAD, INITIAL   Result Value Ref Range    Ventricular Rate 63 BPM    Atrial Rate 63 BPM    P-R Interval 150 ms    QRS Duration 70 ms    Q-T Interval 452 ms    QTC Calculation (Bezet) 462 ms    Calculated P Axis 75 degrees    Calculated R Axis 65 degrees    Calculated T Axis 96 degrees    Diagnosis       Normal sinus rhythm  Normal ECG  When compared with ECG of 21-JUN-2018 10:13,  premature ventricular complexes are no longer present          Assessment:   Given the patient's current clinical presentation, there is a high level of concern for decompensation if discharged from the emergency department.  Complex decision making was performed, which includes reviewing the patient's available past medical records, laboratory results, and imaging studies. Right-sided facial droop and numbness  Severe intracranial MCA stenosis  Meningioma  Dehydration  Intertrigo  Alzheimer's dementia  Hyperlipidemia  Plan:   Patient will be admitted observed on a telemetry bed  MRI of the brain, neurovascular checks, aspirin, statin, neurology consult, neuro interventional radiology consulted and they recommend no acute emergent intervention, spoke to the son and he agrees with the same, PT OT speech consult and further intervention per hospital course  IV hydration, repeat labs in the morning  Diflucan IV  Fall precautions  Continue statin    Spoke with son Luis Carlos Ryder who reports that patient is a DNR    DIET: DIET NPO   ISOLATION PRECAUTIONS: There are currently no Active Isolations  CODE STATUS: Full Code   DVT PROPHYLAXIS: SCDs  FUNCTIONAL STATUS PRIOR TO HOSPITALIZATION: Capable of only limited self-care; confined to bed or chair likely more than 50% of waking hours. EARLY MOBILITY ASSESSMENT: Recommend an assessment from physical therapy and/or occupational therapy  ANTICIPATED DISCHARGE: Greater than 48 hours. Signed By: Ellis Xie MD     March 9, 2022         Please note that this dictation may have been completed with Dragon, the [employer] voice recognition software. Quite often unanticipated grammatical, syntax, homophones, and other interpretive errors are inadvertently transcribed by the computer software. Please disregard these errors. Please excuse any errors that have escaped final proofreading.

## 2022-03-09 NOTE — PROGRESS NOTES
Problem: Self Care Deficits Care Plan (Adult)  Goal: *Acute Goals and Plan of Care (Insert Text)  Description: FUNCTIONAL STATUS PRIOR TO ADMISSION: Patient admitted from [facility] SNF vs nursing home, requires assist for ADLs and short distance mobility with RW, wheelchair for long distance, assist of 0-2 for transfers. Relies on staff assist for IADLs. HOME SUPPORT: The patient admitted from nursing home, staff assist PRN. Occupational Therapy Goals  Initiated 3/9/2022   1. Patient will perform self-feeding with supervision/set-up within 7 day(s). 2.  Patient will perform grooming in unsupported sitting with minimal assistance/contact guard assist within 7 day(s). 3.  Patient will perform bathing with moderate assistance within 7 day(s). 4.  Patient will perform upper body dressing with supervision/set-up within 7 day(s). 5.  Patient will perform toilet transfers to/from [facility] with minimal assistance/contact guard assist within 7 day(s). 6.  Patient will perform all aspects of toileting with moderate assistance  within 7 day(s). 7.  Patient will participate in upper extremity therapeutic exercise/activities with supervision/set-up within 7 day(s). 8.  Patient will utilize energy conservation techniques during functional activities with verbal and visual cues within 7 day(s). 9.  Patient will complete the [id number] within 7 days. Outcome: Not Met     OCCUPATIONAL THERAPY EVALUATION  Patient: Patrick Bautista (80 y.o. female)  Date: 3/9/2022  Primary Diagnosis: CVA (cerebral vascular accident) [facility]) [I63.9]        Precautions: Fall, /180       ASSESSMENT  Based on the objective data described below, the patient presents with decreased balance, strength, coordination, activity tolerance, cognition, AROM, facial sensation, vision (R>L), and lower body access with admission for CVA workup, CT negative with MRI pending.  Patient is a poor historian, hx of dementia and macular degeneration, admitted from SNF where she received assist for ADLs and mobility with RW vs wheelchair. She now presents below her baseline, requiring Min-Mod A for limited OOB mobility with RW support, poor safety awareness requiring mod cues for safety with fair-poor carryover. AROM generally equal however debilitated, requiring Mod-Total A for seated & standing ADLs. Patient perseverative on facial numbness & R cranial pain, difficulty redirecting to task, unable to attend to [facility] this session. Given her current level of function, recommend d/c to SNF rehab. Current Level of Function Impacting Discharge (ADLs/self-care): Mod-Total A for ADLs, Min-Mod A for limited OOB mobility    Functional Outcome Measure: The patient scored Total: 25/100 on the Barthel Index outcome measure which is indicative of being very dependent in basic self-care. Other factors to consider for discharge: fall risk, PMH, PLOF, acute neuro workup     Patient will benefit from skilled therapy intervention to address the above noted impairments. PLAN :  Recommendations and Planned Interventions: self care training, functional mobility training, therapeutic exercise, balance training, visual/perceptual training, therapeutic activities, cognitive retraining, endurance activities, neuromuscular re-education, patient education, home safety training, and family training/education    Frequency/Duration: Patient will be followed by occupational therapy 3 times a week to address goals. Recommendation for discharge: (in order for the patient to meet his/her long term goals)  Therapy up to 5 days/week in SNF setting    This discharge recommendation:  Has been made in collaboration with the attending provider and/or case management    IF patient discharges home will need the following DME:  To be determined (TBD)         SUBJECTIVE:   Patient stated I can't swallow, I tried to drink my juice earlier and it spilled out of my mouth everywhere.  patient perseverative throughout session, repeating x5    OBJECTIVE DATA SUMMARY:   HISTORY:   Past Medical History:   Diagnosis Date    Alzheimer disease ([address]     Depressive episode     Dry eye syndrome     Hyperlipidemia     Macular degeneration     Muscle spasm     Primary hypertension     Spinal stenosis    No past surgical history on file. Expanded or extensive additional review of patient history:     Home Situation  Home Environment: [address] [place] [address] Name: Sanford Mcburney    Hand dominance: Right    EXAMINATION OF PERFORMANCE DEFICITS:  Cognitive/Behavioral Status:  Neurologic State: Alert  Orientation Level: Oriented to person;Oriented to place;Oriented to situation;Disoriented to time  Cognition: Decreased attention/concentration; Follows commands; Impaired decision making; Impulsive;Memory loss;Poor safety awareness     Perseveration: Perseverates during conversation (R facial pain & numbness, trouble swallowing)  Safety/Judgement: Decreased awareness of environment;Decreased awareness of need for assistance;Decreased awareness of need for safety;Decreased insight into deficits    Skin: Appears intact    Edema: None    Hearing: Auditory  Auditory Impairment: Hard of hearing, bilateral    Vision/Perceptual:    Tracking: Requires cues, head turns, or add eye shifts to track (decreased E eye opening monocularly)                 Diplopia: No    Acuity:  (baseline acuity decr, unable to fully open R eye monocularly)         Range of Motion:  AROM: Generally decreased, functional  PROM: Generally decreased, functional                      Strength:  Strength: Generally decreased, functional                Coordination:  Coordination: Generally decreased, functional  Fine Motor Skills-Upper: Left Intact; Right Intact    Gross Motor Skills-Upper: Left Impaired;Right Impaired    Tone & Sensation:     Sensation: Impaired (R facial numbness)                      Balance:  Sitting: Impaired  Sitting - Static: Fair (occasional)  Sitting - Dynamic: Fair (occasional)  Standing: Impaired; With support (RW)  Standing - Static: Fair;Constant support  Standing - Dynamic : Poor;Constant support    Functional Mobility and Transfers for ADLs:  Bed Mobility:  Supine to Sit: Minimum assistance; Additional time  Sit to Supine:  (in chair)  Scooting: Minimum assistance; Additional time    Transfers:  Sit to Stand: Minimum assistance; Adaptive equipment  Stand to Sit: Moderate assistance;Assist x1 (poor eccentric control)  Bed to Chair: Moderate assistance;Assist x1;Adaptive equipment (RW)  Toilet Transfer : Moderate assistance; Adaptive equipment (RW)  Assistive Device : Gait Belt;Walker, rolling    ADL Assessment:  Feeding: Minimum assistance    Oral Facial Hygiene/Grooming: Moderate assistance    Bathing: Maximum assistance    Upper Body Dressing: Maximum assistance    Lower Body Dressing: Total assistance    Toileting: Total assistance                ADL Intervention and task modifications:     Lower Body Dressing Assistance  Dressing Assistance: Total assistance(dependent)  Socks: Total assistance (dependent)  Leg Crossed Method Used: No  Position Performed: Seated edge of bed  Cues: Physical assistance; Tactile cues provided;Verbal cues provided;Visual cues provided    Toileting  Toileting Assistance: Total assistance(dependent) (brief)    Cognitive Retraining  Safety/Judgement: Decreased awareness of environment;Decreased awareness of need for assistance;Decreased awareness of need for safety;Decreased insight into deficits    Functional Measure:  Unable to complete Fugl Lopez d/t cognitive deficits and perseveration on swallowing deficits, will follow up as able    Barthel Index:  Bathin  Bladder: 0  Bowels: 0  Groomin  Dressin  Feedin  Mobility: 0  Stairs: 0  Toilet Use: 5  Transfer (Bed to Chair and Back): 10  Total: 25/100      The Barthel ADL Index: Guidelines  1.  The index should be used as a record of what a patient does, not as a record of what a patient could do. 2. The main aim is to establish degree of independence from any help, physical or verbal, however minor and for whatever reason. 3. The need for supervision renders the patient not independent. 4. A patient's performance should be established using the best available evidence. Asking the patient, friends/relatives and nurses are the usual sources, but direct observation and common sense are also important. However direct testing is not needed. 5. Usually the patient's performance over the preceding 24-48 hours is important, but occasionally longer periods will be relevant. 6. Middle categories imply that the patient supplies over 50 per cent of the effort. 7. Use of aids to be independent is allowed. Score Interpretation (from [address] [facility] [address])    Independent   60-79 Minimally independent   40-59 Partially dependent   20-39 Very dependent   <20 Totally dependent     -Remington Jackson., Barthel, D.W. (1965). Functional evaluation: the Barthel Index. [address] [facility] ([address] [facility] (1997). The Barthel activities of daily living index: self-reporting versus actual performance in the old (> or = 75 years). Journal of [address] [place] [address] 457), [facility], JREYES, Ricardo Hess., Breonna Camacho. (1999). Measuring the change in disability after inpatient rehabilitation; comparison of the responsiveness of the Barthel Index and Functional [place] Measure. Journal of Neurology, Neurosurgery, and Psychiatry, 66(4), 629-052. Michael German N.J.NEFTALI, CATHY Desai, & Carlos Quintanilla M.A. (2004) Assessment of post-stroke quality of life in cost-effectiveness studies: The usefulness of the Barthel Index and the EuroQoL-5D.  Quality of Life Research, 15, 270-79     Occupational Therapy Evaluation Charge Determination   History Examination Decision-Making   MEDIUM Complexity : Expanded review of history including physical, cognitive and psychosocial  history  MEDIUM Complexity : 3-5 performance deficits relating to physical, cognitive , or psychosocial skils that result in activity limitations and / or participation restrictions MEDIUM Complexity : Patient may present with comorbidities that affect occupational performnce. Miniml to moderate modification of tasks or assistance (eg, physical or verbal ) with assesment(s) is necessary to enable patient to complete evaluation       Based on the above components, the patient evaluation is determined to be of the following complexity level: MEDIUM  Pain Rating:  R facial and cranial discomfort & numbness reported    Activity Tolerance:   Fair    After treatment patient left in no apparent distress:    Sitting in chair, Call bell within reach, and Bed / chair alarm activated    COMMUNICATION/EDUCATION:   The patients plan of care was discussed with: Registered nurse. Patient not appropriate for BE FAST and deficit education at this time despite attempts, will follow up as able. Home safety education was provided and the patient/caregiver indicated understanding., Patient/family have participated as able in goal setting and plan of care. , and Patient/family agree to work toward stated goals and plan of care. This patients plan of care is appropriate for delegation to [facility].     Thank you for this referral.  PRANEETH Kumar, OTR/L  Time Calculation: 25 mins

## 2022-03-10 LAB
ATRIAL RATE: 63 BPM
CALCULATED P AXIS, ECG09: 75 DEGREES
CALCULATED R AXIS, ECG10: 65 DEGREES
CALCULATED T AXIS, ECG11: 96 DEGREES
CHOLEST SERPL-MCNC: 213 MG/DL
CRP SERPL-MCNC: <0.29 MG/DL (ref 0–0.6)
DIAGNOSIS, 93000: NORMAL
ERYTHROCYTE [SEDIMENTATION RATE] IN BLOOD: 17 MM/HR (ref 0–30)
EST. AVERAGE GLUCOSE BLD GHB EST-MCNC: 108 MG/DL
HBA1C MFR BLD: 5.4 % (ref 4–5.6)
HDLC SERPL-MCNC: 60 MG/DL
HDLC SERPL: 3.6 {RATIO} (ref 0–5)
LDLC SERPL CALC-MCNC: 133.8 MG/DL (ref 0–100)
P-R INTERVAL, ECG05: 150 MS
Q-T INTERVAL, ECG07: 452 MS
QRS DURATION, ECG06: 70 MS
QTC CALCULATION (BEZET), ECG08: 462 MS
TRIGL SERPL-MCNC: 96 MG/DL (ref ?–150)
VENTRICULAR RATE, ECG03: 63 BPM
VLDLC SERPL CALC-MCNC: 19.2 MG/DL

## 2022-03-10 PROCEDURE — 97161 PT EVAL LOW COMPLEX 20 MIN: CPT

## 2022-03-10 PROCEDURE — 92610 EVALUATE SWALLOWING FUNCTION: CPT | Performed by: SPEECH-LANGUAGE PATHOLOGIST

## 2022-03-10 PROCEDURE — 36415 COLL VENOUS BLD VENIPUNCTURE: CPT

## 2022-03-10 PROCEDURE — 99232 SBSQ HOSP IP/OBS MODERATE 35: CPT | Performed by: PSYCHIATRY & NEUROLOGY

## 2022-03-10 PROCEDURE — 92526 ORAL FUNCTION THERAPY: CPT | Performed by: SPEECH-LANGUAGE PATHOLOGIST

## 2022-03-10 PROCEDURE — 86140 C-REACTIVE PROTEIN: CPT

## 2022-03-10 PROCEDURE — 96376 TX/PRO/DX INJ SAME DRUG ADON: CPT

## 2022-03-10 PROCEDURE — G0378 HOSPITAL OBSERVATION PER HR: HCPCS

## 2022-03-10 PROCEDURE — 74011250637 HC RX REV CODE- 250/637: Performed by: FAMILY MEDICINE

## 2022-03-10 PROCEDURE — 74011250636 HC RX REV CODE- 250/636: Performed by: FAMILY MEDICINE

## 2022-03-10 PROCEDURE — 74011000250 HC RX REV CODE- 250: Performed by: FAMILY MEDICINE

## 2022-03-10 PROCEDURE — 74011250637 HC RX REV CODE- 250/637: Performed by: INTERNAL MEDICINE

## 2022-03-10 PROCEDURE — 83036 HEMOGLOBIN GLYCOSYLATED A1C: CPT

## 2022-03-10 PROCEDURE — 80061 LIPID PANEL: CPT

## 2022-03-10 PROCEDURE — 97535 SELF CARE MNGMENT TRAINING: CPT

## 2022-03-10 PROCEDURE — 85652 RBC SED RATE AUTOMATED: CPT

## 2022-03-10 RX ORDER — ACETAMINOPHEN 325 MG/1
650 TABLET ORAL 3 TIMES DAILY
Status: DISCONTINUED | OUTPATIENT
Start: 2022-03-10 | End: 2022-03-12 | Stop reason: HOSPADM

## 2022-03-10 RX ADMIN — FLUCONAZOLE 200 MG: 2 INJECTION, SOLUTION INTRAVENOUS at 16:13

## 2022-03-10 RX ADMIN — FAMOTIDINE 20 MG: 10 INJECTION INTRAVENOUS at 16:13

## 2022-03-10 RX ADMIN — ACETAMINOPHEN 650 MG: 325 TABLET ORAL at 17:28

## 2022-03-10 RX ADMIN — SERTRALINE HYDROCHLORIDE 50 MG: 50 TABLET ORAL at 09:25

## 2022-03-10 RX ADMIN — MICONAZOLE NITRATE 2 % TOPICAL POWDER: at 18:11

## 2022-03-10 RX ADMIN — ATORVASTATIN CALCIUM 80 MG: 20 TABLET, FILM COATED ORAL at 22:49

## 2022-03-10 RX ADMIN — ASPIRIN 300 MG: 300 SUPPOSITORY RECTAL at 09:25

## 2022-03-10 RX ADMIN — MICONAZOLE NITRATE 2 % TOPICAL POWDER: at 11:50

## 2022-03-10 RX ADMIN — ACETAMINOPHEN 650 MG: 325 TABLET ORAL at 22:49

## 2022-03-10 NOTE — PROGRESS NOTES
Problem: Dysphagia (Adult)  Goal: *Acute Goals and Plan of Care (Insert Text)  Description: Initiated 3/10/2022  1. Patient will tolerate PO medications in applesauce free of sequelae of aspiration within 7 days. 2. Patient will participate in re-assessment of swallow function within 7 days. Outcome: Not Met   SPEECH LANGUAGE PATHOLOGY BEDSIDE SWALLOW EVALUATION  Patient: Jeremías Gonzalez (80 y.o. female)  Date: 3/10/2022  Primary Diagnosis: CVA (cerebral vascular accident) [facility]) [I63.9]        Precautions: fall       ASSESSMENT :  Based on the objective data described below, the patient presents with mild to moderate oral dysphagia and suspected mild pharyngeal dysphagia. She demonstrates coughing on thin liquids, but only accepted limited trials. She appeared to cough prior to initiating a swallow. Difficult to determine whether cough was related to discomfort from cold water or aspiration. (She says it feels weird going down). She then refused to drink anything else. No s/s of aspiration with puree. She was unable to masticate a cracker and expectorated it. Of note, patient's MRI shows, \"No acute finding. Stable mass/meningioma. \" dating back to 2018, patient with a head CT showing, \"Previous right mastoidectomy and cranioplasty. Hyperostosis of the right petrous apex associated with an adjacent partially calcified 2.3 x 1.6 cm x 1.4 cm soft tissue mass, most likely representing a meningioma. Left parietal extracranial soft tissue swelling. \"   Given this information, suspect her oral symptoms are consistent with her baseline, though it does appear she came here with a change in facial droop. Unclear why she is currently coughing with thins. It may be beneficial to re-assess with room temperature water, when she is agreeable to try again, to rule out discomfort from cold liquid. Second visit: Treatment performed. Provided patient with room temperature water.  She coughed and stated, \"I don't do water, never have.\" Offered her Boost instead, which she then tolerated free of s/s of aspiration. Suspect coughing is due to dis-taste for water/dis-taste for cold. Will initiate diet. If there is any concern for aspiration, will attempt MBS. Patient will benefit from skilled intervention to address the above impairments. Patients rehabilitation potential is considered to be Fair     PLAN :  Recommendations and Planned Interventions:  Continue NPO  OK for meds in puree  Will re-assess    Update after swallow treatment: Initiate puree diet, thin liquids. Avoid cold liquids  Frequency/Duration: Patient will be followed by speech-language pathology 3 times a week to address goals. Discharge Recommendations: To Be Determined     SUBJECTIVE:   Patient stated Oh no, I won't drink any more. OBJECTIVE:     Past Medical History:   Diagnosis Date    Alzheimer disease ([facility] [address]     Depressive episode     Dry eye syndrome     Hyperlipidemia     Macular degeneration     Muscle spasm     Primary hypertension     Spinal stenosis    No past surgical history on file. Prior Level of Function/Home Situation:    Home Situation  Home Environment: [address] [place] [address] Name: [address] [place] [address] prior to admission: unknown, suspect soft, given patient report  Current Diet:  NPO   Cognitive and Communication Status:  Neurologic State: Alert  Orientation Level: Oriented to person,Oriented to place  Cognition: Follows commands     Perseveration: No perseveration noted  Safety/Judgement: Decreased awareness of environment,Decreased awareness of need for assistance,Decreased awareness of need for safety,Decreased insight into deficits  Oral Assessment:  Oral Assessment  Labial: Right droop  Dentition: Natural;Limited  Oral Hygiene: very dry  Lingual: Right deviation  Mandible: No impairment  P.O. Trials:  Patient Position: up in bed  Vocal quality prior to P.O.: No impairment  Consistency Presented:  Thin liquid;Puree; Solid  How Presented: SLP-fed/presented;Self-fed/presented;Cup/sip;Spoon;Straw     Bolus Acceptance: No impairment  Bolus Formation/Control: Impaired  Type of Impairment:  (unable to masticate a solid, expectorated)  Propulsion: No impairment  Oral Residue: None  Initiation of Swallow: No impairment     Aspiration Signs/Symptoms: Strong cough (with thins)                Oral Phase Severity: Mild-moderate           Pain:         Burning of her butt, RN notified    After treatment:   Patient left in no apparent distress in bed, Call bell within reach, and Nursing notified    COMMUNICATION/EDUCATION:   Patient was educated regarding purpose of SLP visit. She participated. The patient's plan of care including recommendations, planned interventions, and recommended diet changes were discussed with: Registered nurse. Patient is unable to participate in goal setting and plan of care.     Thank you for this referral.  ESTRADA Burton  Time Calculation: 20 mins

## 2022-03-10 NOTE — WOUND CARE
WOCN Note:     New consult for red sacrum and under breasts    Chart shows:  Admitted 3/9/22 for CVA  History of UTI  Admitted from [address]:   Appropriately conversational and reports pain in right side of face. Able to stand with assist and walker from recliner. Wearing briefs    Surface: [employer]ac[employer] foam mattress    Bilateral heels intact and without erythema. Heels offloaded with pillow on foot of recliner. Buttocks and sacrum intact with diffuse blanching pinkness. Red rash under breasts with satellite lesions consistent with candidiasis. Wound Recommendations:    Under breasts: continue miconazole powder    PI Prevention:  Turn/reposition approximately every 2 hours  EHOB waffle cushion in chair while sitting   Offload heels with heels hanging off end of pillow at all times while in bed.     Transition of Care: Plan to follow weekly and as needed while admitted to hospital.     KATIUSKA NguyenN, RN, [facility] [place]  Certified Wound, Ostomy, Continence Nurse  office [phone]  Available via [address] [place] [address]

## 2022-03-10 NOTE — PROGRESS NOTES
Problem: Pressure Injury - Risk of  Goal: *Prevention of pressure injury  Description: Document Franklyn Scale and appropriate interventions in the flowsheet. Outcome: Progressing Towards Goal  Note: Pressure Injury Interventions:  Sensory Interventions: Assess changes in LOC,Avoid rigorous massage over bony prominences,Discuss PT/OT consult with provider,Float heels,Keep linens dry and wrinkle-free,Minimize linen layers,Pad between skin to skin    Moisture Interventions: Absorbent underpads,Internal/External urinary devices,Limit adult briefs,Minimize layers    Activity Interventions: Increase time out of bed,Pressure redistribution bed/mattress(bed type),PT/OT evaluation    Mobility Interventions: HOB 30 degrees or less,Pressure redistribution bed/mattress (bed type),PT/OT evaluation,Turn and reposition approx.  every two hours(pillow and wedges)    Nutrition Interventions: Document food/fluid/supplement intake    Friction and Shear Interventions: Apply protective barrier, creams and emollients,Lift sheet,Minimize layers                Problem: Patient Education: Go to Patient Education Activity  Goal: Patient/Family Education  Outcome: Progressing Towards Goal     Problem: Patient Education: Go to Patient Education Activity  Goal: Patient/Family Education  Outcome: Progressing Towards Goal     Problem: Patient Education: Go to Patient Education Activity  Goal: Patient/Family Education  Outcome: Progressing Towards Goal     Problem: TIA/CVA Stroke: 0-24 hours  Goal: Off Pathway (Use only if patient is Off Pathway)  Outcome: Progressing Towards Goal  Goal: Activity/Safety  Outcome: Progressing Towards Goal  Goal: Consults, if ordered  Outcome: Progressing Towards Goal  Goal: Diagnostic Test/Procedures  Outcome: Progressing Towards Goal  Goal: Nutrition/Diet  Outcome: Progressing Towards Goal  Goal: Discharge Planning  Outcome: Progressing Towards Goal  Goal: Medications  Outcome: Progressing Towards Goal  Goal: Respiratory  Outcome: Progressing Towards Goal  Goal: Treatments/Interventions/Procedures  Outcome: Progressing Towards Goal  Goal: Minimize risk of bleeding post-thrombolytic infusion  Outcome: Progressing Towards Goal  Goal: Monitor for complications post-thrombolytic infusion  Outcome: Progressing Towards Goal  Goal: Psychosocial  Outcome: Progressing Towards Goal  Goal: *Hemodynamically stable  Outcome: Progressing Towards Goal  Goal: *Neurologically stable  Description: Absence of additional neurological deficits    Outcome: Progressing Towards Goal  Goal: *Verbalizes anxiety and depression are reduced or absent  Outcome: Progressing Towards Goal  Goal: *Absence of Signs of Aspiration on Current Diet  Outcome: Progressing Towards Goal  Goal: *Absence of deep venous thrombosis signs and symptoms(Stroke Metric)  Outcome: Progressing Towards Goal  Goal: *Ability to perform ADLs and demonstrates progressive mobility and function  Outcome: Progressing Towards Goal  Goal: *Stroke education started(Stroke Metric)  Outcome: Progressing Towards Goal  Goal: *Dysphagia screen performed(Stroke Metric)  Outcome: Progressing Towards Goal  Goal: *Rehab consulted(Stroke Metric)  Outcome: Progressing Towards Goal     Problem: Ischemic Stroke: Discharge Outcomes  Goal: *Verbalizes anxiety and depression are reduced or absent  Outcome: Progressing Towards Goal  Goal: *Verbalize understanding of risk factor modification(Stroke Metric)  Outcome: Progressing Towards Goal  Goal: *Hemodynamically stable  Outcome: Progressing Towards Goal  Goal: *Absence of aspiration pneumonia  Outcome: Progressing Towards Goal  Goal: *Aware of needed dietary changes  Outcome: Progressing Towards Goal  Goal: *Verbalize understanding of prescribed medications including anti-coagulants, anti-lipid, and/or anti-platelets(Stroke Metric)  Outcome: Progressing Towards Goal  Goal: *Tolerating diet  Outcome: Progressing Towards Goal  Goal: *Aware of follow-up diagnostics related to anticoagulants  Outcome: Progressing Towards Goal  Goal: *Ability to perform ADLs and demonstrates progressive mobility and function  Outcome: Progressing Towards Goal  Goal: *Absence of DVT(Stroke Metric)  Outcome: Progressing Towards Goal  Goal: *Absence of aspiration  Outcome: Progressing Towards Goal  Goal: *Optimal pain control at patient's stated goal  Outcome: Progressing Towards Goal  Goal: *Home safety concerns addressed  Outcome: Progressing Towards Goal  Goal: *Describes available resources and support systems  Outcome: Progressing Towards Goal  Goal: *Verbalizes understanding of activation of EMS(911) for stroke symptoms(Stroke Metric)  Outcome: Progressing Towards Goal  Goal: *Understands and describes signs and symptoms to report to providers(Stroke Metric)  Outcome: Progressing Towards Goal  Goal: *Neurolgocially stable (absence of additional neurological deficits)  Outcome: Progressing Towards Goal  Goal: *Verbalizes importance of follow-up with primary care physician(Stroke Metric)  Outcome: Progressing Towards Goal  Goal: *Smoking cessation discussed,if applicable(Stroke Metric)  Outcome: Progressing Towards Goal  Goal: *Depression screening completed(Stroke Metric)  Outcome: Progressing Towards Goal     Problem: Pain  Goal: *Control of Pain  Outcome: Progressing Towards Goal     Problem: Patient Education: Go to Patient Education Activity  Goal: Patient/Family Education  Outcome: Progressing Towards Goal     Problem: Falls - Risk of  Goal: *Absence of Falls  Description: Document Cesar Fall Risk and appropriate interventions in the flowsheet.   Outcome: Progressing Towards Goal  Note: Fall Risk Interventions:  Mobility Interventions: Bed/chair exit alarm,Communicate number of staff needed for ambulation/transfer,OT consult for ADLs,Patient to call before getting OOB,PT Consult for mobility concerns,PT Consult for assist device competence,Strengthening exercises (ROM-active/passive),Utilize walker, cane, or other assistive device    Mentation Interventions: Bed/chair exit alarm,Door open when patient unattended,Evaluate medications/consider consulting pharmacy,Increase mobility,More frequent rounding,Reorient patient,Room close to nurse's station    Medication Interventions: Assess postural VS orthostatic hypotension,Evaluate medications/consider consulting pharmacy,Patient to call before getting OOB,Utilize gait belt for transfers/ambulation,Teach patient to arise slowly,Bed/chair exit alarm    Elimination Interventions: Bed/chair exit alarm,Call light in reach,Patient to call for help with toileting needs,Toilet paper/wipes in reach,Stay With Me (per policy),Toileting schedule/hourly rounds              Problem: Patient Education: Go to Patient Education Activity  Goal: Patient/Family Education  Outcome: Progressing Towards Goal     Problem: Risk for Spread of Infection  Goal: Prevent transmission of infectious organism to others  Description: Prevent the transmission of infectious organisms to other patients, staff members, and visitors.   Outcome: Progressing Towards Goal     Problem: Patient Education:  Go to Education Activity  Goal: Patient/Family Education  Outcome: Progressing Towards Goal     Problem: Patient Education: Go to Patient Education Activity  Goal: Patient/Family Education  Outcome: Progressing Towards Goal     Problem: Discharge Planning  Goal: *Discharge to safe environment  Outcome: Progressing Towards Goal  Goal: *Knowledge of medication management  Outcome: Progressing Towards Goal  Goal: *Knowledge of discharge instructions  Outcome: Progressing Towards Goal     Problem: Patient Education: Go to Patient Education Activity  Goal: Patient/Family Education  Outcome: Progressing Towards Goal

## 2022-03-10 NOTE — PROGRESS NOTES
[facility] Adult  Hospitalist Group                                                                                          Hospitalist Progress Note  Ivonne Valdez Hayesynes,   Answering service: [phone] OR [phone] from in house phone        Date of Service:  3/10/2022  NAME:  Micael Simmonds  :  8/15/1925  MRN:  [id number]      Admission Summary:   80 y.o. female who presents with facial droop and right-sided facial numbness  Not much history could be obtained from the patient, history is primary obtained from chart review  Patient with history of dementia, not able to provide much history, I spoke to patient's son Meera Cantu he reports that patient has history of dementia, has \"good days and bad days\", reports that usually she is not able to maintain a conversation, reports that she has history of recurrent UTIs, usually representing symptom is hallucinations, reports that patient is on a suppressive antibiotic regimen for recurrent UTIs, currently patient is resting in bed and does not appear to be in any acute distress, no further history could be obtained from the patient      Interval history / Subjective: Follow up right sided facial droop. Patient seen and examined earlier today. Has ongoing facial pain. Diet initiated. MRI brain negative.       Assessment & Plan:     Right-sided facial droop and numbness  Right sided facial pain  -Imaging negative  -Continue supportive care  -tylenol TID scheduled  -diet as tolerated  -ESR, CRP wnl    Severe intracranial MCA stenosis:  -no intervention needed     Meningioma:  -stable per imaging    Dehydration  -s/p IVFs     Intertrigo:  -miconazole BID, fluconazole    Alzheimer's dementia  -supportive care    Hyperlipidemia- home statin        Code status: DNR  Prophylaxis: SCDs  Care Plan discussed with: Nurse,   Anticipated Disposition: Long-term care     Hospital Problems  Date Reviewed: 2018          Codes Class Noted POA    CVA (cerebral vascular accident) [facility]) ICD-10-CM: I63.9  ICD-9-CM: 434.91  3/9/2022 Unknown                Review of Systems:   Negative unless stated above    Vital Signs:    Last 24hrs VS reviewed since prior progress note. Most recent are:  Visit Vitals  BP (!) 142/55   Pulse 73   Temp 98.6 °F (37 °C)   Resp 14   Ht 5' (1.524 m)   Wt 58.7 kg (129 lb 6.6 oz)   SpO2 97%   BMI 25.27 kg/m²         Intake/Output Summary (Last 24 hours) at 3/10/2022 1830  Last data filed at 3/10/2022 1411  Gross per 24 hour   Intake 240 ml   Output --   Net 240 ml        Physical Examination:     I had a face to face encounter with this patient and independently examined them on 3/10/2022 as outlined below:          Constitutional:  No acute distress, cooperative, pleasant    ENT:  Oral mucosa moist, oropharynx benign. Resp:  CTA bilaterally. No wheezing/rhonchi/rales. No accessory muscle use. CV:  Regular rhythm, normal rate, no murmurs, gallops, rubs    GI:  Soft, non distended, non tender. normoactive bowel sounds, no hepatosplenomegaly     Musculoskeletal:  No edema, warm, 2+ pulses throughout    Neurologic:  Moves all extremities, right-sided facial droop            Data Review:    Review and/or order of clinical lab test  Review and/or order of tests in the radiology section of CPT  Review and/or order of tests in the medicine section of CPT      Labs:     Recent Labs     03/09/22  1002   WBC 6.5   HGB 12.5   HCT 39.4        Recent Labs     03/09/22  1002      K 4.3   *   CO2 26   BUN 20   CREA 1.07*   GLU 88   CA 8.8   MG 2.5*     Recent Labs     03/09/22  1002   LPSE 100     Recent Labs     03/09/22  1002   INR 1.1   PTP 11.4*      No results for input(s): FE, TIBC, PSAT, FERR in the last 72 hours. No results found for: FOL, RBCF   No results for input(s): PH, PCO2, PO2 in the last 72 hours. No results for input(s): CPK, CKNDX, TROIQ in the last 72 hours.     No lab exists for component: CPKMB  Lab Results Component Value Date/Time    Cholesterol, total 213 (H) 03/10/2022 05:05 AM    HDL Cholesterol 60 03/10/2022 05:05 AM    LDL, calculated 133.8 (H) 03/10/2022 05:05 AM    Triglyceride 96 03/10/2022 05:05 AM    CHOL/HDL Ratio 3.6 03/10/2022 05:05 AM     No results found for: [facility]  Lab Results   Component Value Date/Time    Color YELLOW/STRAW 03/09/2022 03:21 PM    Appearance TURBID (A) 03/09/2022 03:21 PM    Specific gravity >1.030 03/09/2022 03:21 PM    Specific gravity 1.012 06/21/2018 10:35 AM    pH (UA) 7.5 03/09/2022 03:21 PM    Protein Negative 03/09/2022 03:21 PM    Glucose Negative 03/09/2022 03:21 PM    Ketone Negative 03/09/2022 03:21 PM    Bilirubin Negative 03/09/2022 03:21 PM    Urobilinogen 0.2 03/09/2022 03:21 PM    Nitrites Positive (A) 03/09/2022 03:21 PM    Leukocyte Esterase LARGE (A) 03/09/2022 03:21 PM    Epithelial cells MANY (A) 03/09/2022 03:21 PM    Bacteria 4+ (A) 03/09/2022 03:21 PM    WBC >100 (H) 03/09/2022 03:21 PM    RBC 0-5 03/09/2022 03:21 PM         Medications Reviewed:     Current Facility-Administered Medications   Medication Dose Route Frequency    polyethylene glycol (MIRALAX) packet 17 g  17 g Oral DAILY PRN    sertraline (ZOLOFT) tablet 50 mg  50 mg Oral DAILY    acetaminophen (TYLENOL) tablet 650 mg  650 mg Oral Q4H PRN    Or    acetaminophen (TYLENOL) solution 650 mg  650 mg Per NG tube Q4H PRN    Or    acetaminophen (TYLENOL) suppository 650 mg  650 mg Rectal Q4H PRN    aspirin (ASA) suppository 300 mg  300 mg Rectal DAILY    atorvastatin (LIPITOR) tablet 80 mg  80 mg Oral QHS    fluconazole (DIFLUCAN) 200mg/100 mL IVPB (premix)  200 mg IntraVENous Q24H    miconazole (MICOTIN) 2 % powder   Topical BID    famotidine (PF) (PEPCID) 20 mg in 0.9% sodium chloride 10 mL injection  20 mg IntraVENous Q24H     ______________________________________________________________________  EXPECTED LENGTH OF STAY: - - -  ACTUAL LENGTH OF STAY:          0                 Ivonne M Flaco Shaffer, DO

## 2022-03-10 NOTE — PROGRESS NOTES
Problem: Mobility Impaired (Adult and Pediatric)  Goal: *Acute Goals and Plan of Care (Insert Text)  Description:   FUNCTIONAL STATUS PRIOR TO ADMISSION: LTC resident; staff provided assist as needed with ADLs; ambulatory short distances with RW and assist; participating in an exercise program?    HOME SUPPORT PRIOR TO ADMISSION: LTC facility staff    Physical Therapy Goals  Initiated 3/10/2022  1. Patient will move from supine to sit and sit to supine  and scoot up and down in bed with supervision/set-up within 7 day(s). 2.  Patient will transfer from bed to chair and chair to bed with supervision/set-up using the least restrictive device within 7 day(s). 3.  Patient will perform sit to stand with supervision/set-up within 7 day(s). 4.  Patient will ambulate with supervision/set-up for 50 feet with the least restrictive device within 7 day(s). 5.  Patient will improve Dumont Balance score by 7 points within 7 days. Outcome: Progressing Towards Goal     PHYSICAL THERAPY EVALUATION- NEURO POPULATION  Patient: Michelle Knight (80 y.o. female)  Date: 3/10/2022  Primary Diagnosis: CVA (cerebral vascular accident) [facility]) [I63.9]        Precautions:          ASSESSMENT  Based on the objective data described below, the patient presents with slightly impaired functional mobility as compared to baseline level 2* generalized weakness, confusion (Alzheimer's dementia), impaired balance, and impaired gait following admission for c/o slurred speech and R facial droop. 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 At baseline, pt lives in a LTC facility where she receives assist as needed with ADLs/ mobility. Received pt up in chair this AM. Pleasantly confused and cooperative with some encouragement. Neuro exam appears non focal - strength, coordination, vision, and speech in tact.  She was able to complete sit<>stands from chair with up to min A with cues for hand placement for safety. Gait training initiated in room with RW and min A for safety. Demos increasing B knee flexion with fatigue but no full buckle; generally unsteady and quick to sit prior to fully squaring to chair. Remained up in chair at end of session, NAD. Anticipate that she is likely near her baseline level - recommend discharge back to familiar environment/routine with continued staff assist.     Current Level of Function Impacting Discharge (mobility/balance): min A with RW    Functional Outcome Measure: The patient scored Total: 4/56 on the [facility] Assessment which is indicative of high fall risk. Other factors to consider for discharge: likely near her baseline      Patient will benefit from skilled therapy intervention to address the above noted impairments. PLAN :  Recommendations and Planned Interventions: bed mobility training, transfer training, gait training, therapeutic exercises, neuromuscular re-education, patient and family training/education, and therapeutic activities      Frequency/Duration: Patient will be followed by physical therapy:  3 times a week to address goals. Recommendation for discharge: (in order for the patient to meet his/her long term goals)  Back to LTC/SNF    This discharge recommendation:  A follow-up discussion with the attending provider and/or case management is planned    IF patient discharges home will need the following DME: patient owns DME required for discharge         SUBJECTIVE:   Patient stated Thats what got me in here, doing too much. I can't be doing all that therapy.     OBJECTIVE DATA SUMMARY:   HISTORY:    Past Medical History:   Diagnosis Date    Alzheimer disease ([facility] [address]     Depressive episode     Dry eye syndrome     Hyperlipidemia     Macular degeneration     Muscle spasm     Primary hypertension     Spinal stenosis    No past surgical history on file.     Personal factors and/or comorbidities impacting plan of care: [facility]    [address] [facility] Environment: [address] [facility] Name: [id number]: Child(fausto)  Patient Expects to be Discharged to[de-identified] Long Term Care    EXAMINATION/PRESENTATION/DECISION MAKING:   Critical Behavior:  Neurologic State: Alert  Orientation Level: Oriented to place,Oriented to person  Cognition: Decreased attention/concentration,Follows commands,Memory loss,Poor safety awareness,Impulsive  Safety/Judgement: Decreased awareness of environment,Decreased awareness of need for assistance,Decreased awareness of need for safety,Decreased insight into deficits  Hearing: Auditory  Auditory Impairment: Hard of hearing, bilateral  Skin:    Edema:   Range Of Motion:  AROM: Generally decreased, functional  PROM: Generally decreased, functional    Strength:    Strength: Generally decreased, functional         Coordination:  Coordination: Generally decreased, functional  Vision:   Tracking: Able to track stimulus in all quadrants w/o difficulty  Diplopia: No  Functional Mobility:  Bed Mobility:  Scooting: Stand-by assistance (in chair)    Transfers:  Sit to Stand: Minimum assistance  Stand to Sit: Minimum assistance    Balance:   Sitting: Impaired  Sitting - Static: Fair (occasional)  Sitting - Dynamic: Fair (occasional)  Standing: Impaired; With support  Standing - Static: Fair;Constant support  Standing - Dynamic : Fair;Constant support  Ambulation/Gait Training:  Distance (ft): 30 Feet (ft)  Assistive Device: Gait belt;Walker, rolling  Ambulation - Level of Assistance: Assist x1;Minimal assistance  Gait Description (WDL): Exceptions to WDL  Gait Abnormalities: Decreased step clearance;Shuffling gait  Base of Support: Widened  Speed/Vida: Shuffled; Slow  Step Length: Right shortened;Left shortened      Functional Measure  Dumont Balance Test:    Sitting to Standin  Standing Unsupported: 0  Sitting with Back Unsupported: 2  Standing to Sittin  Transfers: 1  Standing Unsupported with Eyes Closed: 0  Standing Unsupported with Feet Together: 0  Reach Forward with Outstretched Arm: 0   Object: 0  Turn to Look Over Shoulders: 0  Turn 360 Degrees: 0  Alternate Foot on Step/Stool: 0  Standing Unsupported One Foot in Front: 0  Stand on One Le  Total: 4/56         56=Maximum possible score;   0-20=High fall risk  21-40=Moderate fall risk   41-56=Low fall risk        Physical Therapy Evaluation Charge Determination   History Examination Presentation Decision-Making   HIGH Complexity :3+ comorbidities / personal factors will impact the outcome/ POC  LOW Complexity : 1-2 Standardized tests and measures addressing body structure, function, activity limitation and / or participation in recreation  LOW Complexity : Stable, uncomplicated  HIGH Complexity : FOTO score of 1- 25       Based on the above components, the patient evaluation is determined to be of the following complexity level: LOW   Activity Tolerance:   Fair      After treatment patient left in no apparent distress:   Sitting in chair, Call bell within reach, and Bed / chair alarm activated    COMMUNICATION/EDUCATION:   The patients plan of care was discussed with: Occupational therapist and Registered nurse. Patient and/or family was verbally educated on the BE FAST acronym for signs/symptoms of CVA and TIA. BE FAST was written on patient's communication board  for visual education and reinforcement. All questions answered with patient indicating fair understanding. Fall prevention education was provided and the patient/caregiver indicated understanding., Patient/family have participated as able in goal setting and plan of care. , and Patient/family agree to work toward stated goals and plan of care.     Thank you for this referral.  Elle Howe, PT, DPT   Time Calculation: 13 mins

## 2022-03-10 NOTE — PROGRESS NOTES
Bedside and Verbal shift change report given to Jojo Matthews (oncoming nurse) by Aj Mahmood RN (offgoing nurse). Report included the following information SBAR, Kardex, Intake/Output, MAR and Recent Results.
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
## 2022-03-10 NOTE — PROGRESS NOTES
Transition of Care Plan: Back to [facility]    RUR: N/A    PCP F/U: Dr. Ivan Martins    Disposition: Back to [facility]    Transportation: BLS    3115: Telephone call to patient's son. Introduced self and role of CM. States that patient lives in [facility] and plans for patient to go back when medically stable using S transport. Nora Wesley RN, CRM    Medicare Outpatient Observation Notice (MOON)/ [place] Outpatient Observation Notice (Zak Backers) provided to patient/representative with verbal explanation of the notice. Time allotted for questions regarding the notice. Patient /representative provided a completed copy of the MOON/VOON notice. Copy placed on bedside chart.     Residency:  []Private residence []Apartment []Assisted Living [x]LTC-[facility] []Other:   Exterior Steps: 0  Interior Steps: 0     Lives With:  []With spouse []Other family members []Underage children []Alone []Care provider [x]Other: LTC     Prior functioning:  []Independent with ADLs and iADLS [x]Dependent with ADLs and iADLs []Partial dependence, Specify:      Prior DME used:  []None []RW []Cane []Crutches []Bedside commode []CPAP []Home O2 (Liter/Provider: ) []Nebulizer  []Shower Chair [x]Wheelchair []Hospital Bed []Jeri []Stair lift []Rollator []Other:     Rehab history: []None []Outpatient PT [x]Home Health (unknown name/date ) [x]SNF ([address] ) [x]IPR (unknown name/date ) []LTC (Provider/Date: ) []Hospice (Provider/Date: )  []Other:     [address]armacy: [address]    Reason for Admission:  CVA                     RUR Score:   n/a                  Plan for utilizing home health:  None indicated         PCP: First and Last name:  Dominic Juarez MD     Name of Practice:    Are you a current patient: Yes/No: yes   Approximate date of last visit: 2-3 months   Can you participate in a virtual visit with your PCP: no                    Current Advanced Directive/Advance Care Plan: DNR      Healthcare Decision Maker:   Click here to complete [address] including selection of the Healthcare Decision Maker Relationship (ie \"Primary\")  Jennifer [phone]                  Transition of Care Plan:    Back to [address] Management Interventions  PCP Verified by CM:  Yes  Mode of Transport at Discharge: BLS  Transition of Care Consult (CM Consult): Long Term Care  Physical Therapy Consult: Yes  Occupational Therapy Consult: Yes  Speech Therapy Consult: Yes  Support Systems: Child(fausto)  Confirm Follow Up Transport: Other (see comment) (BLS)  The Plan for Transition of Care is Related to the Following Treatment Goals : LTC  The Patient and/or Patient Representative was Provided with a Choice of Provider and Agrees with the Discharge Plan?: Yes  Name of the Patient Representative Who was Provided with a Choice of Provider and Agrees with the Discharge Plan: Jennifer  Freedom of Choice List was Provided with Basic Dialogue that Supports the Patient's Individualized Plan of Care/Goals, Treatment Preferences and Shares the Quality Data Associated with the Providers?: Yes  Discharge Location  Patient Expects to be Discharged to[de-identified] [facility]
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
## 2022-03-11 LAB
ALBUMIN SERPL-MCNC: 3.1 G/DL (ref 3.5–5)
ALBUMIN/GLOB SERPL: 0.9 {RATIO} (ref 1.1–2.2)
ALP SERPL-CCNC: 60 U/L (ref 45–117)
ALT SERPL-CCNC: 25 U/L (ref 12–78)
ANION GAP SERPL CALC-SCNC: 1 MMOL/L (ref 5–15)
AST SERPL-CCNC: ABNORMAL U/L (ref 15–37)
BILIRUB SERPL-MCNC: 0.3 MG/DL (ref 0.2–1)
BUN SERPL-MCNC: 24 MG/DL (ref 6–20)
BUN/CREAT SERPL: 24 (ref 12–20)
CALCIUM SERPL-MCNC: 8.7 MG/DL (ref 8.5–10.1)
CHLORIDE SERPL-SCNC: 111 MMOL/L (ref 97–108)
CO2 SERPL-SCNC: 25 MMOL/L (ref 21–32)
COMMENT, HOLDF: NORMAL
CREAT SERPL-MCNC: 0.99 MG/DL (ref 0.55–1.02)
GLOBULIN SER CALC-MCNC: 3.6 G/DL (ref 2–4)
GLUCOSE BLD STRIP.AUTO-MCNC: 137 MG/DL (ref 65–117)
GLUCOSE SERPL-MCNC: 88 MG/DL (ref 65–100)
POTASSIUM SERPL-SCNC: ABNORMAL MMOL/L (ref 3.5–5.1)
PROT SERPL-MCNC: 6.7 G/DL (ref 6.4–8.2)
SAMPLES BEING HELD,HOLD: NORMAL
SERVICE CMNT-IMP: ABNORMAL
SODIUM SERPL-SCNC: 137 MMOL/L (ref 136–145)

## 2022-03-11 PROCEDURE — G0378 HOSPITAL OBSERVATION PER HR: HCPCS

## 2022-03-11 PROCEDURE — 36415 COLL VENOUS BLD VENIPUNCTURE: CPT

## 2022-03-11 PROCEDURE — 65660000000 HC RM CCU STEPDOWN

## 2022-03-11 PROCEDURE — 74011250637 HC RX REV CODE- 250/637: Performed by: INTERNAL MEDICINE

## 2022-03-11 PROCEDURE — 74011250636 HC RX REV CODE- 250/636: Performed by: FAMILY MEDICINE

## 2022-03-11 PROCEDURE — 74011000250 HC RX REV CODE- 250: Performed by: FAMILY MEDICINE

## 2022-03-11 PROCEDURE — 77030038269 HC DRN EXT URIN PURWCK BARD -A

## 2022-03-11 PROCEDURE — 80053 COMPREHEN METABOLIC PANEL: CPT

## 2022-03-11 PROCEDURE — 74011250637 HC RX REV CODE- 250/637: Performed by: FAMILY MEDICINE

## 2022-03-11 PROCEDURE — 82962 GLUCOSE BLOOD TEST: CPT

## 2022-03-11 PROCEDURE — 92526 ORAL FUNCTION THERAPY: CPT | Performed by: SPEECH-LANGUAGE PATHOLOGIST

## 2022-03-11 RX ADMIN — ACETAMINOPHEN 650 MG: 325 TABLET ORAL at 16:09

## 2022-03-11 RX ADMIN — ACETAMINOPHEN 650 MG: 325 TABLET ORAL at 09:45

## 2022-03-11 RX ADMIN — FAMOTIDINE 20 MG: 10 INJECTION INTRAVENOUS at 16:13

## 2022-03-11 RX ADMIN — SERTRALINE HYDROCHLORIDE 50 MG: 50 TABLET ORAL at 09:45

## 2022-03-11 RX ADMIN — MICONAZOLE NITRATE 2 % TOPICAL POWDER: at 09:50

## 2022-03-11 RX ADMIN — MICONAZOLE NITRATE 2 % TOPICAL POWDER: at 18:14

## 2022-03-11 RX ADMIN — ACETAMINOPHEN 650 MG: 325 TABLET ORAL at 21:59

## 2022-03-11 RX ADMIN — ASPIRIN 300 MG: 300 SUPPOSITORY RECTAL at 09:50

## 2022-03-11 RX ADMIN — ATORVASTATIN CALCIUM 80 MG: 20 TABLET, FILM COATED ORAL at 21:58

## 2022-03-11 RX ADMIN — FLUCONAZOLE 200 MG: 2 INJECTION, SOLUTION INTRAVENOUS at 16:19

## 2022-03-11 NOTE — PROGRESS NOTES
Verbal shift change report given to [facility] and [place] (oncoming nurse) by [place] (offgoing nurse). Report included the following information SBAR, Kardex, Intake/Output, MAR, Accordion and Recent Results. Uneventful shift. Patient alert and oriented to person, partially oriented to situation and time. Patient ate 100% of lunch and dinner tray. Met BP goal of <180 throughout shift. One assist to bedside commode with one BM during shift. Bed alarm on. Expected discharge tomorrow. Verbal shift change report given to [place] (oncoming nurse) by Rosa Mauro (offgoing nurse). Report included the following information SBAR, Kardex, Intake/Output, MAR, Accordion and Recent Results.

## 2022-03-11 NOTE — PROGRESS NOTES
Problem: Dysphagia (Adult)  Goal: *Acute Goals and Plan of Care (Insert Text)  Description: Initiated 3/10/2022  1. Patient will tolerate PO medications in applesauce free of sequelae of aspiration within 7 days. 2. Patient will participate in re-assessment of swallow function within 7 days. Outcome: Progressing Towards Goal    SPEECH LANGUAGE PATHOLOGY DYSPHAGIA TREATMENT  Patient: Micael Simmonds (80 y.o. female)  Date: 3/11/2022  Diagnosis: [facility] [I63.9] <principal problem not specified>       Precautions: fall, aspiration      ASSESSMENT:  Patient seen with breakfast tray, though confused and with limited intake. She initially tried to self-feed orange juice from a cup and did have a weak cough, however, she spilled a large amount of it while drinking and jumped when the liquid hit her neck so suspect this was the cause. She then drank 6oz in large, successive and rapid swallows by straw with no difficulty. Additionally drank multiple successive swallows of milk by straw without difficulty. Tolerated small amount of pureed foods before refusing additional trials. Mental status is a limiting factor in PO intake and suspect intake will be limited and variable. PLAN:  Recommendations and Planned Interventions:  --recommend continue puree/thin liquid diet. Recommend give liquids by straw. Patient prefers room temperature liquids. --will follow for diet tolerance and consideration of upgrade as patient agreeable. Patient continues to benefit from skilled intervention to address the above impairments. Continue treatment per established plan of care. Discharge Recommendations: To Be Determined     SUBJECTIVE:   Patient stated is my mother home yet? .  Patient confused, unable to understand that she is in the hospital    OBJECTIVE:   Cognitive and Communication Status:  Neurologic State: Alert,Confused  Orientation Level: Oriented to person,Disoriented to place,Disoriented to situation,Disoriented to time  Cognition: Decreased command following,Decreased attention/concentration,Poor safety awareness  Perception: Appears intact  Perseveration: Perseverates during conversation  Safety/Judgement: Decreased insight into deficits,Decreased awareness of need for safety,Decreased awareness of need for assistance,Decreased awareness of environment  Dysphagia Treatment:  Oral Assessment:     P.O. Trials:  Patient Position: upright in bed   Vocal quality prior to P.O.: No impairment  Consistency Presented: Thin liquid;Puree  How Presented: Self-fed/presented;SLP-fed/presented;Cup/sip;Cup/gulp;Straw;Successive swallows;Spoon     Bolus Acceptance: No impairment  Bolus Formation/Control: Impaired  Type of Impairment: Anterior;Spillage (with cup drinking)  Propulsion: No impairment  Oral Residue: None  Initiation of Swallow: No impairment  Laryngeal Elevation: Functional  Aspiration Signs/Symptoms: Weak cough (with cup sips only but spilled significant amount on herself)  Pharyngeal Phase Characteristics: No impairment, issues, or problems   Effective Modifications: Straw  Cues for Modifications: None  Comments: much improved tolerance with straw drinking, drank 6oz successively without difficulty     Oral Phase Severity: Mild-moderate  Pharyngeal Phase Severity : Other (comment) (overall functional )  Exercises:  Laryngeal Exercises:                                                                                                                                   Pain:  Pain Scale 1: Numeric (0 - 10)  Pain Intensity 1: 10  Pain Location 1: Mouth;Face;Ear    After treatment:   Patient left in no apparent distress in bed, Call bell within reach, Nursing notified, and Caregiver / family present    COMMUNICATION/EDUCATION:   Patient was educated regarding her deficit(s) of dysphagia as this relates to her diagnosis of CVA workup.   She demonstrated Poor understanding as evidenced by no response to education. The patient's plan of care including recommendations, planned interventions, and recommended diet changes were discussed with: Registered nurse. Poonam Coombs M.CD.  CCC-SLP   Time Calculation: 20 mins

## 2022-03-11 NOTE — PROGRESS NOTES
Bedside and Verbal shift change report given to Jojo Matthews (oncoming nurse) by Domenic Marcano RN (offgoing nurse). Report included the following information SBAR, Kardex, Intake/Output, MAR, Recent Results and Med Rec Status.

## 2022-03-11 NOTE — PROGRESS NOTES
Transition of Care Plan: Back to [facility] LT     RUR: 10% low     PCP F/U: Dr. Perla Olmstead     Disposition: Back to [facility]     Transportation: BLS     Main Contact: [phone]: Spoke with son Leslie Cope. Informed him of patient's hospital status change to inpatient. IMM letter provided via email per son's request.     Shadi Prasad RN, CRM Medicare pt has received, reviewed, and signed 1st IM letter informing them of their right to appeal the discharge. Signed copy has been placed on pt bedside chart.

## 2022-03-11 NOTE — PROGRESS NOTES
[address] [facility] Adult  Hospitalist Group                                                                                          Hospitalist Progress Note  Ivonne Rivas DO  Answering service: [phone] OR [phone] from in house phone        Date of Service:  3/11/2022  NAME:  Elvie Jhaveri  :  8/15/1925  MRN:  [id number]      Admission Summary:   80 y.o. female who presents with facial droop and right-sided facial numbness  Not much history could be obtained from the patient, history is primary obtained from chart review  Patient with history of dementia, not able to provide much history, I spoke to patient's son Fabiana Lawson he reports that patient has history of dementia, has \"good days and bad days\", reports that usually she is not able to maintain a conversation, reports that she has history of recurrent UTIs, usually representing symptom is hallucinations, reports that patient is on a suppressive antibiotic regimen for recurrent UTIs, currently patient is resting in bed and does not appear to be in any acute distress, no further history could be obtained from the patient      Interval history / Subjective: Follow up right sided facial droop. Patient seen and examined earlier today. Able to eat pureed food. States pain on right side improved. Discussed with son via phone.       Assessment & Plan:     Right-sided facial droop and numbness- resolved  Right sided facial pain-improved  -Imaging negative  -Continue supportive care  -tylenol TID scheduled  -diet as tolerated  -ESR, CRP wnl    Severe intracranial MCA stenosis:  -no intervention needed     Meningioma:  -stable per imaging    Dehydration  -s/p IVFs     Intertrigo:  -miconazole BID, fluconazole    Alzheimer's dementia  -supportive care    Hyperlipidemia- home statin        Code status: DNR  Prophylaxis: SCDs  Care Plan discussed with: Nurse,   Anticipated Disposition: Long-term care, suspect 3/12     Hospital Problems  Date Reviewed: 6/21/2018          Codes Class Noted POA    CVA (cerebral vascular accident) [facility]) ICD-10-CM: I63.9  ICD-9-CM: 434.91  3/9/2022 Unknown                Review of Systems:   Negative unless stated above    Vital Signs:    Last 24hrs VS reviewed since prior progress note. Most recent are:  Visit Vitals  BP (!) 141/59 (BP 1 Location: Left upper arm, BP Patient Position: At rest)   Pulse 60   Temp 97.6 °F (36.4 °C)   Resp 22   Ht 5' (1.524 m)   Wt 58.7 kg (129 lb 6.6 oz)   SpO2 94%   BMI 25.27 kg/m²         Intake/Output Summary (Last 24 hours) at 3/11/2022 1648  Last data filed at 3/11/2022 0615  Gross per 24 hour   Intake 480 ml   Output --   Net 480 ml        Physical Examination:     I had a face to face encounter with this patient and independently examined them on 3/11/2022 as outlined below:          Constitutional:  No acute distress, cooperative, pleasant    ENT:  Oral mucosa moist, oropharynx benign. Resp:  CTA bilaterally. No wheezing/rhonchi/rales. No accessory muscle use. CV:  Regular rhythm, normal rate, no murmurs, gallops, rubs    GI:  Soft, non distended, non tender.  normoactive bowel sounds, no hepatosplenomegaly     Musculoskeletal:  No edema, warm, 2+ pulses throughout    Neurologic:  Moves all extremities, no facial droop, non tender to palpation right side            Data Review:    Review and/or order of clinical lab test  Review and/or order of tests in the radiology section of CPT  Review and/or order of tests in the medicine section of CPT      Labs:     Recent Labs     03/09/22  1002   WBC 6.5   HGB 12.5   HCT 39.4        Recent Labs     03/11/22  0413 03/09/22  1002    141   K HEMOLYZED,RECOLLECT REQUESTED 4.3   * 111*   CO2 25 26   BUN 24* 20   CREA 0.99 1.07*   GLU 88 88   CA 8.7 8.8   MG  --  2.5*     Recent Labs     03/11/22  0413 03/09/22  1002   ALT 25  --    AP 60  --    TBILI 0.3  --    TP 6.7  --    ALB 3.1*  --    GLOB 3.6  --    LPSE  --  100     Recent Labs 03/09/22  1002   INR 1.1   PTP 11.4*      No results for input(s): FE, TIBC, PSAT, FERR in the last 72 hours. No results found for: FOL, RBCF   No results for input(s): PH, PCO2, PO2 in the last 72 hours. No results for input(s): CPK, CKNDX, TROIQ in the last 72 hours.     No lab exists for component: CPKMB  Lab Results   Component Value Date/Time    Cholesterol, total 213 (H) 03/10/2022 05:05 AM    HDL Cholesterol 60 03/10/2022 05:05 AM    LDL, calculated 133.8 (H) 03/10/2022 05:05 AM    Triglyceride 96 03/10/2022 05:05 AM    CHOL/HDL Ratio 3.6 03/10/2022 05:05 AM     No results found for: [facility]  Lab Results   Component Value Date/Time    Color YELLOW/STRAW 03/09/2022 03:21 PM    Appearance TURBID (A) 03/09/2022 03:21 PM    Specific gravity >1.030 03/09/2022 03:21 PM    Specific gravity 1.012 06/21/2018 10:35 AM    pH (UA) 7.5 03/09/2022 03:21 PM    Protein Negative 03/09/2022 03:21 PM    Glucose Negative 03/09/2022 03:21 PM    Ketone Negative 03/09/2022 03:21 PM    Bilirubin Negative 03/09/2022 03:21 PM    Urobilinogen 0.2 03/09/2022 03:21 PM    Nitrites Positive (A) 03/09/2022 03:21 PM    Leukocyte Esterase LARGE (A) 03/09/2022 03:21 PM    Epithelial cells MANY (A) 03/09/2022 03:21 PM    Bacteria 4+ (A) 03/09/2022 03:21 PM    WBC >100 (H) 03/09/2022 03:21 PM    RBC 0-5 03/09/2022 03:21 PM         Medications Reviewed:     Current Facility-Administered Medications   Medication Dose Route Frequency    acetaminophen (TYLENOL) tablet 650 mg  650 mg Oral TID    polyethylene glycol (MIRALAX) packet 17 g  17 g Oral DAILY PRN    sertraline (ZOLOFT) tablet 50 mg  50 mg Oral DAILY    aspirin (ASA) suppository 300 mg  300 mg Rectal DAILY    atorvastatin (LIPITOR) tablet 80 mg  80 mg Oral QHS    fluconazole (DIFLUCAN) 200mg/100 mL IVPB (premix)  200 mg IntraVENous Q24H    miconazole (MICOTIN) 2 % powder   Topical BID    famotidine (PF) (PEPCID) 20 mg in 0.9% sodium chloride 10 mL injection  20 mg IntraVENous Q24H     ______________________________________________________________________  EXPECTED LENGTH OF STAY: 2d 4h  ACTUAL LENGTH OF STAY:          0                 Ivonne Putnam DO

## 2022-03-12 ENCOUNTER — APPOINTMENT (OUTPATIENT)
Dept: NON INVASIVE DIAGNOSTICS | Age: 87
DRG: 103 | End: 2022-03-12
Attending: FAMILY MEDICINE
Payer: MEDICARE

## 2022-03-12 VITALS
BODY MASS INDEX: 25.41 KG/M2 | OXYGEN SATURATION: 97 % | WEIGHT: 129.41 LBS | DIASTOLIC BLOOD PRESSURE: 59 MMHG | HEART RATE: 57 BPM | SYSTOLIC BLOOD PRESSURE: 175 MMHG | HEIGHT: 60 IN | TEMPERATURE: 97.5 F | RESPIRATION RATE: 13 BRPM

## 2022-03-12 LAB
ANION GAP SERPL CALC-SCNC: 3 MMOL/L (ref 5–15)
BUN SERPL-MCNC: 27 MG/DL (ref 6–20)
BUN/CREAT SERPL: 32 (ref 12–20)
CALCIUM SERPL-MCNC: 8.7 MG/DL (ref 8.5–10.1)
CHLORIDE SERPL-SCNC: 113 MMOL/L (ref 97–108)
CO2 SERPL-SCNC: 27 MMOL/L (ref 21–32)
CREAT SERPL-MCNC: 0.84 MG/DL (ref 0.55–1.02)
ECHO AV AREA PEAK VELOCITY: 2.3 CM2
ECHO AV AREA PEAK VELOCITY: 2.3 CM2
ECHO AV PEAK GRADIENT: 7 MMHG
ECHO AV PEAK VELOCITY: 1.4 M/S
ECHO AV VELOCITY RATIO: 0.93
ECHO EST RA PRESSURE: 3 MMHG
ECHO LV E' LATERAL VELOCITY: 6 CM/S
ECHO LV E' SEPTAL VELOCITY: 5 CM/S
ECHO LV INTERNAL DIMENSION DIASTOLIC MMODE: 5.6 CM (ref 3.9–5.3)
ECHO LV INTERNAL DIMENSION SYSTOLIC MMODE: 3.7 CM
ECHO LV IVSD MMODE: 1 CM (ref 0.6–0.9)
ECHO LV IVSS MMODE: 1.2 CM
ECHO LV POSTERIOR WALL DIASTOLIC MMODE: 0.9 CM (ref 0.6–0.9)
ECHO LV POSTERIOR WALL SYSTOLIC MMODE: 1.6 CM
ECHO LVOT AREA: 2.5 CM2
ECHO LVOT DIAM: 1.8 CM
ECHO LVOT PEAK GRADIENT: 7 MMHG
ECHO LVOT PEAK VELOCITY: 1.3 M/S
ECHO MV A VELOCITY: 0.7 M/S
ECHO MV E VELOCITY: 0.77 M/S
ECHO MV E/A RATIO: 1.1
ECHO MV E/E' LATERAL: 12.83
ECHO MV E/E' RATIO (AVERAGED): 14.12
ECHO MV E/E' SEPTAL: 15.4
ECHO RIGHT VENTRICULAR SYSTOLIC PRESSURE (RVSP): 15 MMHG
ECHO TV REGURGITANT MAX VELOCITY: 1.73 M/S
ECHO TV REGURGITANT PEAK GRADIENT: 12 MMHG
GLUCOSE SERPL-MCNC: 91 MG/DL (ref 65–100)
MAGNESIUM SERPL-MCNC: 2.4 MG/DL (ref 1.6–2.4)
PHOSPHATE SERPL-MCNC: 3.3 MG/DL (ref 2.6–4.7)
POTASSIUM SERPL-SCNC: 4.4 MMOL/L (ref 3.5–5.1)
SODIUM SERPL-SCNC: 143 MMOL/L (ref 136–145)

## 2022-03-12 PROCEDURE — 84100 ASSAY OF PHOSPHORUS: CPT

## 2022-03-12 PROCEDURE — 83735 ASSAY OF MAGNESIUM: CPT

## 2022-03-12 PROCEDURE — 80048 BASIC METABOLIC PNL TOTAL CA: CPT

## 2022-03-12 PROCEDURE — 93306 TTE W/DOPPLER COMPLETE: CPT

## 2022-03-12 PROCEDURE — 93306 TTE W/DOPPLER COMPLETE: CPT | Performed by: INTERNAL MEDICINE

## 2022-03-12 PROCEDURE — 36415 COLL VENOUS BLD VENIPUNCTURE: CPT

## 2022-03-12 PROCEDURE — 74011250637 HC RX REV CODE- 250/637: Performed by: INTERNAL MEDICINE

## 2022-03-12 PROCEDURE — 74011250637 HC RX REV CODE- 250/637: Performed by: NURSE PRACTITIONER

## 2022-03-12 PROCEDURE — 74011250637 HC RX REV CODE- 250/637: Performed by: FAMILY MEDICINE

## 2022-03-12 RX ORDER — ATORVASTATIN CALCIUM 40 MG/1
40 TABLET, FILM COATED ORAL DAILY
Qty: 30 TABLET | Refills: 0 | Status: SHIPPED | OUTPATIENT
Start: 2022-03-12

## 2022-03-12 RX ORDER — ACETAMINOPHEN 500 MG
1000 TABLET ORAL
Status: COMPLETED | OUTPATIENT
Start: 2022-03-12 | End: 2022-03-12

## 2022-03-12 RX ORDER — ACETAMINOPHEN 325 MG/1
650 TABLET ORAL 3 TIMES DAILY
Qty: 30 TABLET | Refills: 0 | Status: SHIPPED
Start: 2022-03-12 | End: 2022-03-17

## 2022-03-12 RX ADMIN — ACETAMINOPHEN 1000 MG: 500 TABLET ORAL at 03:55

## 2022-03-12 RX ADMIN — ASPIRIN 300 MG: 300 SUPPOSITORY RECTAL at 09:59

## 2022-03-12 RX ADMIN — ACETAMINOPHEN 650 MG: 325 TABLET ORAL at 09:59

## 2022-03-12 RX ADMIN — SERTRALINE HYDROCHLORIDE 50 MG: 50 TABLET ORAL at 09:59

## 2022-03-12 RX ADMIN — MICONAZOLE NITRATE 2 % TOPICAL POWDER: at 09:59

## 2022-03-12 NOTE — PROGRESS NOTES
Pt awoke around 0330 with c/o of inner ear pain 6/10. Tylenol ordered TID not due until 0900. Spoke with overnight hospitalist and had 1000 mg acetaminophen one time dose ordered for pain.

## 2022-03-12 NOTE — PROGRESS NOTES
SIMRAN: Discharge back to LTC at [facility]. Transportation in car with son. RUR: 11%    Disposition: Patient admitted 3/11 for CVA. Attending contacted CM stating she is ready to discharge patient. CM spoke with facility and patient's son. Son is coming to visit and will take her back to [facility] this evening. Emergency contact: faye Luna, [phone]    Transition of Care Plan to SNF/Rehab    SNF/Rehab Transition:  Patient has been accepted to [facility] and meets criteria for admission. Patient will transported by son and expected to leave at 5-6pm.    Communication to Patient/Family:  Met with patient and faye Luna, and they are agreeable to the transition plan. Communication to SNF/Rehab:  Bedside RN, Ada Garces, has been notified to update the transition plan to the facility and call report (phone number [phone]). Discharge information has been updated on the AVS.     Discharge instructions to be fax'd to facility at [facility] # . [] BCPI-A  Patient has been identified as part of the  BCPI-A Program.  For Care Coordination associated with that Bundle Program, please contact   Bundle information has been communication to    Nursing Please include all hard scripts for controlled substances, med rec and dc summary, and AVS in packet.      Reviewed and confirmed with facility, [facility], can manage the patient care needs for the following:     Nonnie Kocher with (X) only those applicable:    Medication:  [x]  Medications will be available at the facility  []  IV Antibiotics   []  Controlled Substance - hard copy to be sent with patient   []  Weekly Labs   Documents:  [] Hard RX  [] MAR  [] Kardex  [] AVS  []Transfer Summary  [x]Discharge   Equipment:  []  CPAP/BiPAP  []  Wound Vacuum  []  Leong or Urinary Device  []  PICC/Central Line  []  Nebulizer  []  Ventilator   Treatment:  []Isolation (for MRSA, VRE, etc.)  []Surgical Drain Management  []Tracheostomy Care  []Dressing Changes  []Dialysis with transportation and chair time  []PEG Care  []Oxygen  []Daily Weights for Heart Failure   Dietary:  [x]Any diet limitations  []Tube Feedings   []Total Parenteral Management (TPN)   Eligible for Medicaid Long Term Services and Supports  Yes:  [x] Eligible for medical assistance or will become eligible within 180 days and UAI completed. [] Provider/Patient and/or support system has requested screening. [] UAI copy provided to patient or responsible party,   [] UAI unavailable at discharge will send once processed to SNF provider. [] UAI unavailable at discharged mailed to patient  No:   [] Private pay and is not financially eligible for Medicaid within the next 180 days. [] Reside out-of-state.   [] A residents of a state owned/operated facility that is licensed  by [address] [employer] [facility] or [facility]  [] Enrollment in [facility] hospice services  []  Medical [address]  [] Patient /Family declines to have screening completed or provide financial information for screening     Financial Resources:  Medicaid    [] Initiated and application pending   [x] Full coverage     Advanced Care Plan:  []Surrogate Decision Maker of Care  []POA  [x]Communicated Code Status DNR (DDNR\", \"Full\")    Ron Miller, [address] [facility]  [phone]

## 2022-03-12 NOTE — DISCHARGE INSTRUCTIONS
Discharge Instructions       PATIENT ID: Tee Garcias  MRN: [id number]   YOB: 1925    DATE OF ADMISSION: 3/9/2022  9:51 AM    DATE OF DISCHARGE: 3/12/2022    PRIMARY CARE PROVIDER: Jennifer Steinberg MD     ATTENDING PHYSICIAN: Flaco Hagen DO  DISCHARGING PROVIDER: Mikie Thornton DO    To contact this individual call [phone] and ask the  to page. If unavailable ask to be transferred the Adult Hospitalist Department. DISCHARGE DIAGNOSES     Right facial pain  Right facial droop- resolved    CONSULTATIONS: IP CONSULT TO NEUROLOGY  IP CONSULT TO NEUROLOGY    PROCEDURES/SURGERIES: * No surgery found *    PENDING TEST RESULTS:   At the time of discharge the following test results are still pending: final echocardiogram read  FOLLOW UP APPOINTMENTS:   Follow-up Information    None          ADDITIONAL CARE RECOMMENDATIONS:     Follow up with your primary care physician. You were admitted for right sided facial droop and numbness. You had an MRI that was negative for stroke but did show a meningioma that has not increased in size. You were started on tylenol and facial pain improved. You did not have evidence of an ear infection. Take tylenol 3 times a day for the next 5 days. DIET: Pureed. Can advance as tolerated    ACTIVITY: as tolerated     DISCHARGE MEDICATIONS:   See Medication Reconciliation Form    · It is important that you take the medication exactly as they are prescribed. · Keep your medication in the bottles provided by the pharmacist and keep a list of the medication names, dosages, and times to be taken in your wallet. · Do not take other medications without consulting your doctor. NOTIFY YOUR PHYSICIAN FOR ANY OF THE FOLLOWING:   Fever over 101 degrees for 24 hours. Chest pain, shortness of breath, fever, chills, nausea, vomiting, diarrhea, change in mentation, falling, weakness, bleeding. Severe pain or pain not relieved by medications.   Or, any other signs or symptoms that you may have questions about.       Signed:   Iris Weir DO  3/12/2022  2:26 PM

## 2022-03-12 NOTE — PROGRESS NOTES
Problem: Pressure Injury - Risk of  Goal: *Prevention of pressure injury  Description: Document Franklyn Scale and appropriate interventions in the flowsheet.   3/12/2022 1448 by Ori De Leon  Outcome: Resolved/Met  Note: Pressure Injury Interventions:  Sensory Interventions: Assess changes in LOC,Assess need for specialty bed,Avoid rigorous massage over bony prominences    Moisture Interventions: Absorbent underpads,Apply protective barrier, creams and emollients,Assess need for specialty bed    Activity Interventions: Assess need for specialty bed,Chair cushion,Increase time out of bed    Mobility Interventions: Assess need for specialty bed,Chair cushion,Float heels    Nutrition Interventions: Document food/fluid/supplement intake,Discuss nutritional consult with provider    Friction and Shear Interventions: Apply protective barrier, creams and emollients,Feet elevated on foot rest,Foam dressings/transparent film/skin sealants             3/12/2022 1447 by Ronald SCHMIDT  Outcome: Resolved/Not Met  Note: Pressure Injury Interventions:  Sensory Interventions: Assess changes in LOC,Assess need for specialty bed,Avoid rigorous massage over bony prominences    Moisture Interventions: Absorbent underpads,Apply protective barrier, creams and emollients,Assess need for specialty bed    Activity Interventions: Assess need for specialty bed,Chair cushion,Increase time out of bed    Mobility Interventions: Assess need for specialty bed,Chair cushion,Float heels    Nutrition Interventions: Document food/fluid/supplement intake,Discuss nutritional consult with provider    Friction and Shear Interventions: Apply protective barrier, creams and emollients,Feet elevated on foot rest,Foam dressings/transparent film/skin sealants             3/12/2022 0949 by Ronald SCHMIDT  Outcome: Progressing Towards Goal  Note: Pressure Injury Interventions:  Sensory Interventions: Assess changes in LOC,Assess need for specialty bed,Avoid rigorous massage over bony prominences    Moisture Interventions: Absorbent underpads,Apply protective barrier, creams and emollients,Assess need for specialty bed    Activity Interventions: Assess need for specialty bed,Chair cushion,Increase time out of bed    Mobility Interventions: Assess need for specialty bed,Chair cushion,Float heels    Nutrition Interventions: Document food/fluid/supplement intake,Discuss nutritional consult with provider    Friction and Shear Interventions: Apply protective barrier, creams and emollients,Feet elevated on foot rest,Foam dressings/transparent film/skin sealants                Problem: Patient Education: Go to Patient Education Activity  Goal: Patient/Family Education  3/12/2022 1448 by Ashly Reddy  Outcome: Resolved/Met  3/12/2022 1447 by Ashly Reddy  Outcome: Resolved/Not Met  3/12/2022 0949 by Ashly Reddy  Outcome: Progressing Towards Goal     Problem: Patient Education: Go to Patient Education Activity  Goal: Patient/Family Education  3/12/2022 1448 by Ashly Reddy  Outcome: Resolved/Met  3/12/2022 1447 by Ashly Reddy  Outcome: Resolved/Not Met  3/12/2022 0949 by Ashly Reddy  Outcome: Progressing Towards Goal     Problem: Patient Education: Go to Patient Education Activity  Goal: Patient/Family Education  3/12/2022 1448 by Ashly Reddy  Outcome: Resolved/Met  3/12/2022 1447 by Ashly Reddy  Outcome: Resolved/Not Met  3/12/2022 0949 by Ashly Reddy  Outcome: Progressing Towards Goal     Problem: TIA/CVA Stroke: Day 2 Until Discharge  Goal: Off Pathway (Use only if patient is Off Pathway)  3/12/2022 1448 by Ashly Reddy  Outcome: Resolved/Met  3/12/2022 1447 by Ashly Reddy  Outcome: Resolved/Not Met  3/12/2022 0949 by Ashly Reddy  Outcome: Progressing Towards Goal  Goal: Activity/Safety  3/12/2022 1448 by Ashly Reddy  Outcome: Resolved/Met  3/12/2022 1447 by Ashly Reddy  Outcome: Resolved/Not Met  3/12/2022 0949 by Hilary Canary  Outcome: Progressing Towards Goal  Goal: Diagnostic Test/Procedures  3/12/2022 1448 by Hilaryhue Falcon  Outcome: Resolved/Met  3/12/2022 1447 by Hilary Canary  Outcome: Resolved/Not Met  3/12/2022 0949 by Hilary Canary  Outcome: Progressing Towards Goal  Goal: Nutrition/Diet  3/12/2022 1448 by Hilaryhue Falcon  Outcome: Resolved/Met  3/12/2022 1447 by Hilary Canary  Outcome: Resolved/Not Met  3/12/2022 0949 by Hilary Canary  Outcome: Progressing Towards Goal  Goal: Discharge Planning  3/12/2022 1448 by Hilaryhue Falcon  Outcome: Resolved/Met  3/12/2022 1447 by Hilary Canary  Outcome: Resolved/Not Met  3/12/2022 0949 by Hilary Canary  Outcome: Progressing Towards Goal  Goal: Medications  3/12/2022 1448 by Hilaryhue Falcon  Outcome: Resolved/Met  3/12/2022 1447 by Hilary Canary  Outcome: Resolved/Not Met  3/12/2022 0949 by Hilary Canary  Outcome: Progressing Towards Goal  Goal: Respiratory  3/12/2022 1448 by Hilary Canary  Outcome: Resolved/Met  3/12/2022 1447 by Hilary Canary  Outcome: Resolved/Not Met  3/12/2022 0949 by Hilary Canary  Outcome: Progressing Towards Goal  Goal: Treatments/Interventions/Procedures  3/12/2022 1448 by Hilaryhue Falcon  Outcome: Resolved/Met  3/12/2022 1447 by Hilary Canary  Outcome: Resolved/Not Met  3/12/2022 0949 by Hilary Canary  Outcome: Progressing Towards Goal  Goal: Psychosocial  3/12/2022 1448 by Hilary Canary  Outcome: Resolved/Met  3/12/2022 1447 by Hilary Canary  Outcome: Resolved/Not Met  3/12/2022 0949 by Hilary Canary  Outcome: Progressing Towards Goal  Goal: *Verbalizes anxiety and depression are reduced or absent  3/12/2022 1448 by Hilaryhue Falcon  Outcome: Resolved/Met  3/12/2022 1447 by Hilary Canary  Outcome: Resolved/Not Met  3/12/2022 0949 by Hilary Canary  Outcome: Progressing Towards Goal  Goal: *Absence of aspiration  3/12/2022 7308 by Merribeth Bone  Outcome: Resolved/Met  3/12/2022 1447 by Merribeth Bone  Outcome: Resolved/Not Met  3/12/2022 0949 by Merribeth Bone  Outcome: Progressing Towards Goal  Goal: *Absence of deep venous thrombosis signs and symptoms(Stroke Metric)  3/12/2022 1448 by Merribeth Bone  Outcome: Resolved/Met  3/12/2022 1447 by Merribeth Bone  Outcome: Resolved/Not Met  3/12/2022 0949 by Merribeth Bone  Outcome: Progressing Towards Goal  Goal: *Optimal pain control at patient's stated goal  3/12/2022 1448 by Merribeth Bone  Outcome: Resolved/Met  3/12/2022 1447 by Merribeth Bone  Outcome: Resolved/Not Met  3/12/2022 0949 by Merribeth Bone  Outcome: Progressing Towards Goal  Goal: *Tolerating diet  3/12/2022 1448 by Merribeth Bone  Outcome: Resolved/Met  3/12/2022 1447 by Merribeth Bone  Outcome: Resolved/Not Met  3/12/2022 0949 by Merribeth Bone  Outcome: Progressing Towards Goal  Goal: *Ability to perform ADLs and demonstrates progressive mobility and function  3/12/2022 1448 by Merribeth Bone  Outcome: Resolved/Met  3/12/2022 1447 by Merribeth Bone  Outcome: Resolved/Not Met  3/12/2022 0949 by Merribeth Bone  Outcome: Progressing Towards Goal  Goal: *Stroke education continued(Stroke Metric)  3/12/2022 1448 by Merribeth Bone  Outcome: Resolved/Met  3/12/2022 1447 by Merribeth Bone  Outcome: Resolved/Not Met  3/12/2022 0949 by Merribeth Bone  Outcome: Progressing Towards Goal     Problem: Ischemic Stroke: Discharge Outcomes  Goal: *Verbalizes anxiety and depression are reduced or absent  3/12/2022 1448 by Merribeth Bone  Outcome: Resolved/Met  3/12/2022 1447 by Merribeth Bone  Outcome: Resolved/Not Met  3/12/2022 0949 by Merribeth Bone  Outcome: Progressing Towards Goal  Goal: *Verbalize understanding of risk factor modification(Stroke Metric)  3/12/2022 1448 by Merribeth Bone  Outcome: Resolved/Met  3/12/2022 1447 by Marisa Bone  Outcome: Resolved/Not Met  3/12/2022 0949 by Otoniel [place]  Outcome: Progressing Towards Goal  Goal: *Hemodynamically stable  3/12/2022 1448 by Otoniel [place]  Outcome: Resolved/Met  3/12/2022 1447 by Otoniel [place]  Outcome: Resolved/Not Met  3/12/2022 0949 by Otoniel [place]  Outcome: Progressing Towards Goal  Goal: *Absence of aspiration pneumonia  3/12/2022 1448 by Otoniel [place]  Outcome: Resolved/Met  3/12/2022 1447 by Otoniel [place]  Outcome: Resolved/Not Met  3/12/2022 0949 by Otoniel [place]  Outcome: Progressing Towards Goal  Goal: *Aware of needed dietary changes  3/12/2022 1448 by Otoniel [place]  Outcome: Resolved/Met  3/12/2022 1447 by Otoniel [place]  Outcome: Resolved/Not Met  3/12/2022 0949 by Otoniel [place]  Outcome: Progressing Towards Goal  Goal: *Verbalize understanding of prescribed medications including anti-coagulants, anti-lipid, and/or anti-platelets(Stroke Metric)  3/12/2022 1448 by Otoniel [place]  Outcome: Resolved/Met  3/12/2022 1447 by Otoniel [place]  Outcome: Resolved/Not Met  3/12/2022 0949 by Otoniel [place]  Outcome: Progressing Towards Goal  Goal: *Tolerating diet  3/12/2022 1448 by Otoniel [place]  Outcome: Resolved/Met  3/12/2022 1447 by Otoniel [place]  Outcome: Resolved/Not Met  3/12/2022 0949 by Otoniel [place]  Outcome: Progressing Towards Goal  Goal: *Aware of follow-up diagnostics related to anticoagulants  3/12/2022 1448 by Otoniel [place]  Outcome: Resolved/Met  3/12/2022 1447 by Otoniel [place]  Outcome: Resolved/Not Met  3/12/2022 0949 by Otoniel [place]  Outcome: Progressing Towards Goal  Goal: *Ability to perform ADLs and demonstrates progressive mobility and function  3/12/2022 1448 by Otoniel [place]  Outcome: Resolved/Met  3/12/2022 1447 by Otoniel [place]  Outcome: Resolved/Not Met  3/12/2022 0949 by Otoniel [place]  Outcome: Progressing Towards Goal  Goal: *Absence of DVT(Stroke Metric)  3/12/2022 1448 by Syd Dodson, Danielle E  Outcome: Resolved/Met  3/12/2022 1447 by [facility]  Outcome: Resolved/Not Met  3/12/2022 0949 by [facility]  Outcome: Progressing Towards Goal  Goal: *Absence of aspiration  3/12/2022 1448 by [facility]  Outcome: Resolved/Met  3/12/2022 1447 by [facility]  Outcome: Resolved/Not Met  3/12/2022 0949 by [facility]  Outcome: Progressing Towards Goal  Goal: *Optimal pain control at patient's stated goal  3/12/2022 1448 by [facility]  Outcome: Resolved/Met  3/12/2022 1447 by [facility]  Outcome: Resolved/Not Met  3/12/2022 0949 by [facility]  Outcome: Progressing Towards Goal  Goal: *Home safety concerns addressed  3/12/2022 1448 by [facility]  Outcome: Resolved/Met  3/12/2022 1447 by [facility]  Outcome: Resolved/Not Met  3/12/2022 0949 by [facility]  Outcome: Progressing Towards Goal  Goal: *Describes available resources and support systems  3/12/2022 1448 by [facility]  Outcome: Resolved/Met  3/12/2022 1447 by [facility]  Outcome: Resolved/Not Met  3/12/2022 0949 by [facility]  Outcome: Progressing Towards Goal  Goal: *Verbalizes understanding of activation of EMS(911) for stroke symptoms(Stroke Metric)  3/12/2022 1448 by [facility]  Outcome: Resolved/Met  3/12/2022 1447 by [facility]  Outcome: Resolved/Not Met  3/12/2022 0949 by [facility]  Outcome: Progressing Towards Goal  Goal: *Understands and describes signs and symptoms to report to providers(Stroke Metric)  3/12/2022 1448 by [facility]  Outcome: Resolved/Met  3/12/2022 1447 by [facility]  Outcome: Resolved/Not Met  3/12/2022 0949 by [facility]  Outcome: Progressing Towards Goal  Goal: *Neurolgocially stable (absence of additional neurological deficits)  3/12/2022 1448 by [facility]  Outcome: Resolved/Met  3/12/2022 1447 by [facility]  Outcome: Resolved/Not Met  3/12/2022 0949 by Jesus Arita E  Outcome: Progressing Towards Goal  Goal: *Verbalizes importance of follow-up with primary care physician(Stroke Metric)  3/12/2022 1448 by Annalisa Holden  Outcome: Resolved/Met  3/12/2022 1447 by Annalisa Holden  Outcome: Resolved/Not Met  3/12/2022 0949 by Annalisa Holden  Outcome: Progressing Towards Goal  Goal: *Smoking cessation discussed,if applicable(Stroke Metric)  3/12/2022 1448 by Annalisa Holden  Outcome: Resolved/Met  3/12/2022 1447 by Annalisa Holden  Outcome: Resolved/Not Met  3/12/2022 0949 by Annalisa Holden  Outcome: Progressing Towards Goal  Goal: *Depression screening completed(Stroke Metric)  3/12/2022 1448 by Annalisa Holden  Outcome: Resolved/Met  3/12/2022 1447 by Annalisa Holden  Outcome: Resolved/Not Met  3/12/2022 0949 by Annalisa Holden  Outcome: Progressing Towards Goal     Problem: Pain  Goal: *Control of Pain  3/12/2022 1448 by Annalisa Holden  Outcome: Resolved/Met  3/12/2022 1447 by Annalisa Holden  Outcome: Resolved/Not Met  3/12/2022 0949 by Annalisa Holden  Outcome: Progressing Towards Goal  Goal: *PALLIATIVE CARE:  Alleviation of Pain  3/12/2022 1448 by Annalisa Holden  Outcome: Resolved/Met  3/12/2022 1447 by Annalisa Holden  Outcome: Resolved/Not Met  3/12/2022 0949 by Annalisa Holden  Outcome: Progressing Towards Goal     Problem: Patient Education: Go to Patient Education Activity  Goal: Patient/Family Education  3/12/2022 1448 by Annalisa Holden  Outcome: Resolved/Met  3/12/2022 1447 by Annalisa Hodlen  Outcome: Resolved/Not Met  3/12/2022 0949 by Annalisa Holden  Outcome: Progressing Towards Goal     Problem: Falls - Risk of  Goal: *Absence of Falls  Description: Document Randi Hwang Fall Risk and appropriate interventions in the flowsheet.   Outcome: Resolved/Met  Note: Fall Risk Interventions:  Mobility Interventions: Bed/chair exit alarm    Mentation Interventions: Bed/chair exit alarm    Medication Interventions: Bed/chair exit alarm    Elimination Interventions: Bed/chair exit alarm              Problem: Patient Education: Go to Patient Education Activity  Goal: Patient/Family Education  Outcome: Resolved/Met     Problem: Risk for Spread of Infection  Goal: Prevent transmission of infectious organism to others  Description: Prevent the transmission of infectious organisms to other patients, staff members, and visitors.   Outcome: Resolved/Met     Problem: Patient Education:  Go to Education Activity  Goal: Patient/Family Education  Outcome: Resolved/Met     Problem: Patient Education: Go to Patient Education Activity  Goal: Patient/Family Education  Outcome: Resolved/Met     Problem: Discharge Planning  Goal: *Discharge to safe environment  Outcome: Resolved/Met  Goal: *Knowledge of medication management  Outcome: Resolved/Met  Goal: *Knowledge of discharge instructions  Outcome: Resolved/Met     Problem: Discharge Planning  Goal: *Discharge to safe environment  Outcome: Resolved/Met  Goal: *Knowledge of medication management  Outcome: Resolved/Met  Goal: *Knowledge of discharge instructions  Outcome: Resolved/Met     Problem: Discharge Planning  Goal: *Discharge to safe environment  Outcome: Resolved/Met  Goal: *Knowledge of medication management  Outcome: Resolved/Met  Goal: *Knowledge of discharge instructions  Outcome: Resolved/Met     Problem: Patient Education: Go to Patient Education Activity  Goal: Patient/Family Education  Outcome: Resolved/Met     Problem: Patient Education: Go to Patient Education Activity  Goal: Patient/Family Education  Outcome: Resolved/Met

## 2022-03-12 NOTE — PROGRESS NOTES
Problem: Pressure Injury - Risk of  Goal: *Prevention of pressure injury  Description: Document Franklyn Scale and appropriate interventions in the flowsheet.   3/12/2022 1448 by Maria E Sanabria  Outcome: Resolved/Met  Note: Pressure Injury Interventions:  Sensory Interventions: Assess changes in LOC,Assess need for specialty bed,Avoid rigorous massage over bony prominences    Moisture Interventions: Absorbent underpads,Apply protective barrier, creams and emollients,Assess need for specialty bed    Activity Interventions: Assess need for specialty bed,Chair cushion,Increase time out of bed    Mobility Interventions: Assess need for specialty bed,Chair cushion,Float heels    Nutrition Interventions: Document food/fluid/supplement intake,Discuss nutritional consult with provider    Friction and Shear Interventions: Apply protective barrier, creams and emollients,Feet elevated on foot rest,Foam dressings/transparent film/skin sealants             3/12/2022 1447 by Darlene SCHMIDT  Outcome: Resolved/Not Met  Note: Pressure Injury Interventions:  Sensory Interventions: Assess changes in LOC,Assess need for specialty bed,Avoid rigorous massage over bony prominences    Moisture Interventions: Absorbent underpads,Apply protective barrier, creams and emollients,Assess need for specialty bed    Activity Interventions: Assess need for specialty bed,Chair cushion,Increase time out of bed    Mobility Interventions: Assess need for specialty bed,Chair cushion,Float heels    Nutrition Interventions: Document food/fluid/supplement intake,Discuss nutritional consult with provider    Friction and Shear Interventions: Apply protective barrier, creams and emollients,Feet elevated on foot rest,Foam dressings/transparent film/skin sealants             3/12/2022 0949 by Darlene Betancourt E  Outcome: Progressing Towards Goal  Note: Pressure Injury Interventions:  Sensory Interventions: Assess changes in LOC,Assess need for specialty bed,Avoid rigorous massage over bony prominences    Moisture Interventions: Absorbent underpads,Apply protective barrier, creams and emollients,Assess need for specialty bed    Activity Interventions: Assess need for specialty bed,Chair cushion,Increase time out of bed    Mobility Interventions: Assess need for specialty bed,Chair cushion,Float heels    Nutrition Interventions: Document food/fluid/supplement intake,Discuss nutritional consult with provider    Friction and Shear Interventions: Apply protective barrier, creams and emollients,Feet elevated on foot rest,Foam dressings/transparent film/skin sealants                Problem: Patient Education: Go to Patient Education Activity  Goal: Patient/Family Education  3/12/2022 1448 by Deonna Diop  Outcome: Resolved/Met  3/12/2022 1447 by Deonna Diop  Outcome: Resolved/Not Met  3/12/2022 0949 by Deonna Diop  Outcome: Progressing Towards Goal     Problem: Patient Education: Go to Patient Education Activity  Goal: Patient/Family Education  3/12/2022 1448 by Deonna Diop  Outcome: Resolved/Met  3/12/2022 1447 by Deonna Diop  Outcome: Resolved/Not Met  3/12/2022 0949 by Deonna Diop  Outcome: Progressing Towards Goal     Problem: Patient Education: Go to Patient Education Activity  Goal: Patient/Family Education  3/12/2022 1448 by Deonna Diop  Outcome: Resolved/Met  3/12/2022 1447 by Deonna Diop  Outcome: Resolved/Not Met  3/12/2022 0949 by Deonna Diop  Outcome: Progressing Towards Goal     Problem: TIA/CVA Stroke: 0-24 hours  Goal: Off Pathway (Use only if patient is Off Pathway)  3/12/2022 1448 by Deonna Diop  Outcome: Resolved/Met  3/12/2022 1447 by Deonna Diop  Outcome: Resolved/Not Met  3/12/2022 0949 by Deonna Diop  Outcome: Progressing Towards Goal  Goal: Activity/Safety  3/12/2022 1448 by Deonna Diop  Outcome: Resolved/Met  3/12/2022 1447 by Deonna Diop  Outcome: Resolved/Not Met  3/12/2022 0949 by Patience Towanda  Outcome: Progressing Towards Goal  Goal: Consults, if ordered  3/12/2022 1448 by Patience Towanda  Outcome: Resolved/Met  3/12/2022 1447 by Patience Towanda  Outcome: Resolved/Not Met  3/12/2022 0949 by Patience Towanda  Outcome: Progressing Towards Goal  Goal: Diagnostic Test/Procedures  3/12/2022 1448 by Patience Towanda  Outcome: Resolved/Met  3/12/2022 1447 by Patience Towanda  Outcome: Resolved/Not Met  3/12/2022 0949 by Patience Towanda  Outcome: Progressing Towards Goal  Goal: Nutrition/Diet  3/12/2022 1448 by Patience Towanda  Outcome: Resolved/Met  3/12/2022 1447 by Patience Towanda  Outcome: Resolved/Not Met  3/12/2022 0949 by Patience Towanda  Outcome: Progressing Towards Goal  Goal: Discharge Planning  3/12/2022 1448 by Patience Towanda  Outcome: Resolved/Met  3/12/2022 1447 by Patience Towanda  Outcome: Resolved/Not Met  3/12/2022 0949 by Patience Towanda  Outcome: Progressing Towards Goal  Goal: Medications  3/12/2022 1448 by Patience Towanda  Outcome: Resolved/Met  3/12/2022 1447 by Patience Towanda  Outcome: Resolved/Not Met  3/12/2022 0949 by Patience Towanda  Outcome: Progressing Towards Goal  Goal: Respiratory  3/12/2022 1448 by Patience Towanda  Outcome: Resolved/Met  3/12/2022 1447 by Patience Towanda  Outcome: Resolved/Not Met  3/12/2022 0949 by Patience Towanda  Outcome: Progressing Towards Goal  Goal: Treatments/Interventions/Procedures  3/12/2022 1448 by Patience Towanda  Outcome: Resolved/Met  3/12/2022 1447 by Patience Towanda  Outcome: Resolved/Not Met  3/12/2022 0949 by Patience Towanda  Outcome: Progressing Towards Goal  Goal: Minimize risk of bleeding post-thrombolytic infusion  3/12/2022 1448 by Patience Towanda  Outcome: Resolved/Met  3/12/2022 1447 by Patience Towanda  Outcome: Resolved/Not Met  3/12/2022 0949 by Patience Towanda  Outcome: Progressing Towards Goal  Goal: Monitor for complications post-thrombolytic infusion  3/12/2022 1448 by Patience [place]  Outcome: Resolved/Met  3/12/2022 1447 by Patience [place]  Outcome: Resolved/Not Met  3/12/2022 0949 by Patience [place]  Outcome: Progressing Towards Goal  Goal: Psychosocial  3/12/2022 1448 by Patience [place]  Outcome: Resolved/Met  3/12/2022 1447 by Patience [place]  Outcome: Resolved/Not Met  3/12/2022 0949 by Patience [place]  Outcome: Progressing Towards Goal  Goal: *Hemodynamically stable  3/12/2022 1448 by Patience [place]  Outcome: Resolved/Met  3/12/2022 1447 by Patience [place]  Outcome: Resolved/Not Met  3/12/2022 0949 by Patience [place]  Outcome: Progressing Towards Goal  Goal: *Neurologically stable  Description: Absence of additional neurological deficits    3/12/2022 1448 by Patience [place]  Outcome: Resolved/Met  3/12/2022 1447 by Patience [place]  Outcome: Resolved/Not Met  3/12/2022 0949 by Patience [place]  Outcome: Progressing Towards Goal  Goal: *Verbalizes anxiety and depression are reduced or absent  3/12/2022 1448 by Patience [place]  Outcome: Resolved/Met  3/12/2022 1447 by Patience [place]  Outcome: Resolved/Not Met  3/12/2022 0949 by Patience [place]  Outcome: Progressing Towards Goal  Goal: *Absence of Signs of Aspiration on Current Diet  3/12/2022 1448 by Patience [place]  Outcome: Resolved/Met  3/12/2022 1447 by Patience [place]  Outcome: Resolved/Not Met  3/12/2022 0949 by Patience [place]  Outcome: Progressing Towards Goal  Goal: *Absence of deep venous thrombosis signs and symptoms(Stroke Metric)  3/12/2022 1448 by Patience [place]  Outcome: Resolved/Met  3/12/2022 1447 by Patience [place]  Outcome: Resolved/Not Met  3/12/2022 0949 by Patience [place]  Outcome: Progressing Towards Goal  Goal: *Ability to perform ADLs and demonstrates progressive mobility and function  3/12/2022 1448 by Patience [place]  Outcome: Resolved/Met  3/12/2022 1447 by Patience [place]  Outcome: Resolved/Not Met  3/12/2022 0949 by [facility]  Outcome: Progressing Towards Goal  Goal: *Stroke education started(Stroke Metric)  3/12/2022 1448 by [facility]  Outcome: Resolved/Met  3/12/2022 1447 by [facility]  Outcome: Resolved/Not Met  3/12/2022 0949 by [facility]  Outcome: Progressing Towards Goal  Goal: *Dysphagia screen performed(Stroke Metric)  3/12/2022 1448 by [facility]  Outcome: Resolved/Met  3/12/2022 1447 by [facility]  Outcome: Resolved/Not Met  3/12/2022 0949 by [facility]  Outcome: Progressing Towards Goal  Goal: *Rehab consulted(Stroke Metric)  3/12/2022 1448 by [facility]  Outcome: Resolved/Met  3/12/2022 1447 by [facility]  Outcome: Resolved/Not Met  3/12/2022 0949 by [facility]  Outcome: Progressing Towards Goal     Problem: TIA/CVA Stroke: Day 2 Until Discharge  Goal: Off Pathway (Use only if patient is Off Pathway)  3/12/2022 1448 by [facility]  Outcome: Resolved/Met  3/12/2022 1447 by [facility]  Outcome: Resolved/Not Met  3/12/2022 0949 by [facility]  Outcome: Progressing Towards Goal  Goal: Activity/Safety  3/12/2022 1448 by [facility]  Outcome: Resolved/Met  3/12/2022 1447 by [facility]  Outcome: Resolved/Not Met  3/12/2022 0949 by [facility]  Outcome: Progressing Towards Goal  Goal: Diagnostic Test/Procedures  3/12/2022 1448 by [facility]  Outcome: Resolved/Met  3/12/2022 1447 by [facility]  Outcome: Resolved/Not Met  3/12/2022 0949 by [facility]  Outcome: Progressing Towards Goal  Goal: Nutrition/Diet  3/12/2022 1448 by [facility]  Outcome: Resolved/Met  3/12/2022 1447 by [facility]  Outcome: Resolved/Not Met  3/12/2022 0949 by [facility]  Outcome: Progressing Towards Goal  Goal: Discharge Planning  3/12/2022 1448 by [facility]  Outcome: Resolved/Met  3/12/2022 1447 by [facility]  Outcome: Resolved/Not Met  3/12/2022 0949 by [facility]  Outcome: Progressing Towards Goal  Goal: Medications  3/12/2022 1448 by Celesta Stack  Outcome: Resolved/Met  3/12/2022 1447 by Celesta Stack  Outcome: Resolved/Not Met  3/12/2022 0949 by Celesta Stack  Outcome: Progressing Towards Goal  Goal: Respiratory  3/12/2022 1448 by Celesta Stack  Outcome: Resolved/Met  3/12/2022 1447 by Celesta Stack  Outcome: Resolved/Not Met  3/12/2022 0949 by Celesta Stack  Outcome: Progressing Towards Goal  Goal: Treatments/Interventions/Procedures  3/12/2022 1448 by Celesta Stack  Outcome: Resolved/Met  3/12/2022 1447 by Celesta Stack  Outcome: Resolved/Not Met  3/12/2022 0949 by Celesta Stack  Outcome: Progressing Towards Goal  Goal: Psychosocial  3/12/2022 1448 by Celesta Stack  Outcome: Resolved/Met  3/12/2022 1447 by Celesta Stack  Outcome: Resolved/Not Met  3/12/2022 0949 by Celesta Stack  Outcome: Progressing Towards Goal  Goal: *Verbalizes anxiety and depression are reduced or absent  3/12/2022 1448 by Celesta Stack  Outcome: Resolved/Met  3/12/2022 1447 by Celesta Stack  Outcome: Resolved/Not Met  3/12/2022 0949 by Celesta Stack  Outcome: Progressing Towards Goal  Goal: *Absence of aspiration  3/12/2022 1448 by Celesta Stack  Outcome: Resolved/Met  3/12/2022 1447 by Celesta Stack  Outcome: Resolved/Not Met  3/12/2022 0949 by Celesta Stack  Outcome: Progressing Towards Goal  Goal: *Absence of deep venous thrombosis signs and symptoms(Stroke Metric)  3/12/2022 1448 by Celesta Stack  Outcome: Resolved/Met  3/12/2022 1447 by Celesta Stack  Outcome: Resolved/Not Met  3/12/2022 0949 by Celesta Stack  Outcome: Progressing Towards Goal  Goal: *Optimal pain control at patient's stated goal  3/12/2022 1448 by Celesta Stack  Outcome: Resolved/Met  3/12/2022 1447 by Celesta Stack  Outcome: Resolved/Not Met  3/12/2022 0949 by Celesta Stack  Outcome: Progressing Towards Goal  Goal: *Tolerating diet  3/12/2022 1448 by Patience Mar Lin  Outcome: Resolved/Met  3/12/2022 1447 by Patience Mar Lin  Outcome: Resolved/Not Met  3/12/2022 0949 by Patience Mar Lin  Outcome: Progressing Towards Goal  Goal: *Ability to perform ADLs and demonstrates progressive mobility and function  3/12/2022 1448 by Patience Mar Lin  Outcome: Resolved/Met  3/12/2022 1447 by Patience Mar Lin  Outcome: Resolved/Not Met  3/12/2022 0949 by Patience Mar Lin  Outcome: Progressing Towards Goal  Goal: *Stroke education continued(Stroke Metric)  3/12/2022 1448 by Patience Mar Lin  Outcome: Resolved/Met  3/12/2022 1447 by Patience Mar Lin  Outcome: Resolved/Not Met  3/12/2022 0949 by Patience Mar Lin  Outcome: Progressing Towards Goal     Problem: Ischemic Stroke: Discharge Outcomes  Goal: *Verbalizes anxiety and depression are reduced or absent  3/12/2022 1448 by Patience Mar Lin  Outcome: Resolved/Met  3/12/2022 1447 by Patience Mar Lin  Outcome: Resolved/Not Met  3/12/2022 0949 by Patience Mar Lin  Outcome: Progressing Towards Goal  Goal: *Verbalize understanding of risk factor modification(Stroke Metric)  3/12/2022 1448 by Patience Mar Lin  Outcome: Resolved/Met  3/12/2022 1447 by Patience Mar Lin  Outcome: Resolved/Not Met  3/12/2022 0949 by Patience Mar Lin  Outcome: Progressing Towards Goal  Goal: *Hemodynamically stable  3/12/2022 1448 by Patience Mar Lin  Outcome: Resolved/Met  3/12/2022 1447 by Patience Mar Lin  Outcome: Resolved/Not Met  3/12/2022 0949 by Patience Mar Lin  Outcome: Progressing Towards Goal  Goal: *Absence of aspiration pneumonia  3/12/2022 1448 by Patience Mar Lin  Outcome: Resolved/Met  3/12/2022 1447 by Patience Mar Lin  Outcome: Resolved/Not Met  3/12/2022 0949 by Patience Mar Lin  Outcome: Progressing Towards Goal  Goal: *Aware of needed dietary changes  3/12/2022 1448 by Patience Mar Lin  Outcome: Resolved/Met  3/12/2022 1447 by Patience Mar Lin  Outcome: Resolved/Not Met  3/12/2022 0949 by Antolin Vidales  Outcome: Progressing Towards Goal  Goal: *Verbalize understanding of prescribed medications including anti-coagulants, anti-lipid, and/or anti-platelets(Stroke Metric)  3/12/2022 1448 by Antolin Vidales  Outcome: Resolved/Met  3/12/2022 1447 by Antolin Sobeida  Outcome: Resolved/Not Met  3/12/2022 0949 by Antolin Vidales  Outcome: Progressing Towards Goal  Goal: *Tolerating diet  3/12/2022 1448 by Antolin Vidales  Outcome: Resolved/Met  3/12/2022 1447 by Antolin Sobeida  Outcome: Resolved/Not Met  3/12/2022 0949 by Antolin Sobeida  Outcome: Progressing Towards Goal  Goal: *Aware of follow-up diagnostics related to anticoagulants  3/12/2022 1448 by Antolin Vidales  Outcome: Resolved/Met  3/12/2022 1447 by Antolin Vidales  Outcome: Resolved/Not Met  3/12/2022 0949 by Antolin Vidales  Outcome: Progressing Towards Goal  Goal: *Ability to perform ADLs and demonstrates progressive mobility and function  3/12/2022 1448 by Antolin Vidales  Outcome: Resolved/Met  3/12/2022 1447 by Antolin Sobeida  Outcome: Resolved/Not Met  3/12/2022 0949 by Antolin Sobeida  Outcome: Progressing Towards Goal  Goal: *Absence of DVT(Stroke Metric)  3/12/2022 1448 by Antolin Vidales  Outcome: Resolved/Met  3/12/2022 1447 by Antolin Vidales  Outcome: Resolved/Not Met  3/12/2022 0949 by Antolin Vidales  Outcome: Progressing Towards Goal  Goal: *Absence of aspiration  3/12/2022 1448 by Antolin Vidales  Outcome: Resolved/Met  3/12/2022 1447 by Antolin Vidales  Outcome: Resolved/Not Met  3/12/2022 0949 by Antolin Vidales  Outcome: Progressing Towards Goal  Goal: *Optimal pain control at patient's stated goal  3/12/2022 1448 by Antolin Vidales  Outcome: Resolved/Met  3/12/2022 1447 by Antolin Vidales  Outcome: Resolved/Not Met  3/12/2022 0949 by Antolin Likens  Outcome: Progressing Towards Goal  Goal: *Home safety concerns addressed  3/12/2022 1448 by Antolin Vidales  Outcome: Resolved/Met  3/12/2022 1447 by Antolin Vidales  Outcome: Resolved/Not Met  3/12/2022 0949 by Antolin Sobeida  Outcome: Progressing Towards Goal  Goal: *Describes available resources and support systems  3/12/2022 1448 by Antolin Vidales  Outcome: Resolved/Met  3/12/2022 1447 by Antolin Sobeida  Outcome: Resolved/Not Met  3/12/2022 0949 by Antolin Vidales  Outcome: Progressing Towards Goal  Goal: *Verbalizes understanding of activation of EMS(911) for stroke symptoms(Stroke Metric)  3/12/2022 1448 by Antolin Vidales  Outcome: Resolved/Met  3/12/2022 1447 by Antolin Vidales  Outcome: Resolved/Not Met  3/12/2022 0949 by Antolin Vidales  Outcome: Progressing Towards Goal  Goal: *Understands and describes signs and symptoms to report to providers(Stroke Metric)  3/12/2022 1448 by Antolin Vidales  Outcome: Resolved/Met  3/12/2022 1447 by Antolin Vidales  Outcome: Resolved/Not Met  3/12/2022 0949 by Antolin Sobeida  Outcome: Progressing Towards Goal  Goal: *Neurolgocially stable (absence of additional neurological deficits)  3/12/2022 1448 by Antolin Vidales  Outcome: Resolved/Met  3/12/2022 1447 by Antolin Vidales  Outcome: Resolved/Not Met  3/12/2022 0949 by Antolin Sobeida  Outcome: Progressing Towards Goal  Goal: *Verbalizes importance of follow-up with primary care physician(Stroke Metric)  3/12/2022 1448 by Antolin Vidales  Outcome: Resolved/Met  3/12/2022 1447 by Antolin Sobeida  Outcome: Resolved/Not Met  3/12/2022 0949 by Antolin Vidales  Outcome: Progressing Towards Goal  Goal: *Smoking cessation discussed,if applicable(Stroke Metric)  3/12/2022 1448 by Antolin Vidales  Outcome: Resolved/Met  3/12/2022 1447 by Antolin Vidales  Outcome: Resolved/Not Met  3/12/2022 0949 by Antolin Vidales  Outcome: Progressing Towards Goal  Goal: *Depression screening completed(Stroke Metric)  3/12/2022 1448 by Antolin Vidales  Outcome: Resolved/Met  3/12/2022 1447 by William Henriquez E  Outcome: Resolved/Not Met  3/12/2022 0949 by Kiera Sampson  Outcome: Progressing Towards Goal     Problem: Pain  Goal: *Control of Pain  3/12/2022 1448 by Kiera Sampson  Outcome: Resolved/Met  3/12/2022 1447 by Kiera Sampson  Outcome: Resolved/Not Met  3/12/2022 0949 by Kiera Sampson  Outcome: Progressing Towards Goal  Goal: *PALLIATIVE CARE:  Alleviation of Pain  3/12/2022 1448 by Kiera Sampson  Outcome: Resolved/Met  3/12/2022 1447 by Kiera Sampson  Outcome: Resolved/Not Met  3/12/2022 0949 by Kiera Sampson  Outcome: Progressing Towards Goal     Problem: Patient Education: Go to Patient Education Activity  Goal: Patient/Family Education  3/12/2022 1448 by Kiera Sampson  Outcome: Resolved/Met  3/12/2022 1447 by Kiera Sampson  Outcome: Resolved/Not Met  3/12/2022 0949 by Kiera Sampson  Outcome: Progressing Towards Goal

## 2022-03-12 NOTE — DISCHARGE SUMMARY
Discharge Summary       PATIENT ID: Hilda Castillo  MRN: [id number]   YOB: 1925    DATE OF ADMISSION: 3/9/2022  9:51 AM    DATE OF DISCHARGE: 3/12/2022   PRIMARY CARE PROVIDER: Alba Patel MD     ATTENDING PHYSICIAN: Jac Talyor DO   DISCHARGING PROVIDER: Jac Taylor DO    To contact this individual call [phone] and ask the  to page. If unavailable ask to be transferred the Adult Hospitalist Department. CONSULTATIONS: IP CONSULT TO NEUROLOGY  IP CONSULT TO NEUROLOGY    PROCEDURES/SURGERIES: * No surgery found *    ADMISSION SUMMARY AND HOSPITAL COURSE:   Admission History:  \"89 y. o. female who presents with facial droop and right-sided facial numbness  Not much history could be obtained from the patient, history is primary obtained from chart review  Patient with history of dementia, not able to provide much history, I spoke to patient's son Lyubov Chen he reports that patient has history of dementia, has \"good days and bad days\", reports that usually she is not able to maintain a conversation, reports that she has history of recurrent UTIs, usually representing symptom is hallucinations, reports that patient is on a suppressive antibiotic regimen for recurrent UTIs, currently patient is resting in bed and does not appear to be in any acute distress, no further history could be obtained from the patient. \"     MRI brain:  IMPRESSION  No acute finding. Stable mass/meningioma.        DISCHARGE DIAGNOSES / PLAN:      Right-sided facial droop and numbness- resolved  Right sided facial pain-improved  -Imaging negative  -Continue supportive care  -tylenol TID scheduled  -diet as tolerated  -ESR, CRP wnl     Severe intracranial MCA stenosis:  -no intervention needed      Meningioma:  -stable per imaging     Dehydration  -s/p IVFs     Intertrigo:  -s/p miconazole BID, fluconazole while inpatient       Alzheimer's dementia  -supportive care     Hyperlipidemia- home statin        BMI: Body mass index is 25.27 kg/m². . This patient: Has a BMI within normal limits. PENDING TEST RESULTS:   At the time of discharge the following test results are still pending: final echo read     ADDITIONAL CARE RECOMMENDATIONS:   Follow up with your primary care physician. You were admitted for right sided facial droop and numbness. You had an MRI that was negative for stroke but did show a meningioma that has not increased in size. You were started on tylenol and facial pain improved. You did not have evidence of an ear infection. Take tylenol 3 times a day for the next 5 days. DIET: Pureed. Can advance as tolerated    ACTIVITY: as tolerated     NOTIFY YOUR PHYSICIAN FOR ANY OF THE FOLLOWING:   Fever over 101 degrees for 24 hours. Chest pain, shortness of breath, fever, chills, nausea, vomiting, diarrhea, change in mentation, falling, weakness, bleeding. Severe pain or pain not relieved by medications, as well as any other signs or symptoms that you may have questions about. FOLLOW UP APPOINTMENTS:    Follow-up Information     Follow up With Specialties Details Why Contact Info    Emili Mondragon MD Internal Medicine   [address] [place] [address]  [phone]            DISCHARGE MEDICATIONS:  Current Discharge Medication List      START taking these medications    Details   atorvastatin (LIPITOR) 40 mg tablet Take 1 Tablet by mouth daily. Qty: 30 Tablet, Refills: 0  Start date: 3/12/2022         CONTINUE these medications which have CHANGED    Details   acetaminophen (TYLENOL) 325 mg tablet Take 2 Tablets by mouth three (3) times daily for 5 days. Qty: 30 Tablet, Refills: 0  Start date: 3/12/2022, End date: 3/17/2022         CONTINUE these medications which have NOT CHANGED    Details   aspirin 81 mg chewable tablet Take 81 mg by mouth daily. furosemide (LASIX) 20 mg tablet Take 20 mg by mouth daily.       methenamine hippurate (HIPREX) 1 gram tablet Take 1 g by mouth two (2) times daily (with meals). melatonin 5 mg tablet Take 5 mg by mouth nightly. senna (Senna) 8.6 mg tablet Take 1 Tablet by mouth daily. docusate sodium (COLACE) 100 mg capsule Take 100 mg by mouth nightly. sertraline (ZOLOFT) 50 mg tablet Take 50 mg by mouth daily. cholecalciferol (VITAMIN D3) 1,000 unit tablet Take 1,000 Units by mouth daily. Menthol-Zinc Oxide (CALMOSEPTINE) 0.44-20.6 % oint Apply  to affected area three (3) times daily. lactulose (ENULOSE) 10 gram/15 mL solution Take 10 g by mouth daily as needed. Indications: constipation      polyvinyl alcohol (LIQUIFILM TEARS) 1.4 % ophthalmic solution Administer 1 Drop to both eyes every four (4) hours as needed. Indications: Dry Eye         STOP taking these medications       polyethylene glycol (MIRALAX) 17 gram packet Comments:   Reason for Stopping:               DISPOSITION:    Home With:   OT  PT  HH  RN      x Long term SNF/Inpatient Rehab    Independent/assisted living    Hospice    Other:       PATIENT CONDITION AT DISCHARGE:     Functional status    Poor    x Deconditioned     Independent      Cognition     Lucid     Forgetful    x Dementia      Catheters/lines (plus indication)    Leong     PICC     PEG    x None      Code status    Full code    x DNR      PHYSICAL EXAMINATION AT DISCHARGE:  Constitutional:  No acute distress, cooperative, pleasant    ENT:  Oral mucosa moist, oropharynx benign. TMs clear    Resp:  CTA bilaterally. No wheezing/rhonchi/rales. No accessory muscle use. CV:  Regular rhythm, normal rate, no murmurs, gallops, rubs    GI:  Soft, non distended, non tender.  normoactive bowel sounds, no hepatosplenomegaly     Musculoskeletal:  No edema, warm, 2+ pulses throughout    Neurologic:  Moves all extremities, no facial droop, non tender to palpation right side                                   CHRONIC MEDICAL DIAGNOSES:  Problem List as of 3/12/2022 Date Reviewed: 6/21/2018          Codes Class Noted - Resolved    CVA (cerebral vascular accident) [facility]) ICD-10-CM: I63.9  ICD-9-CM: 434.91  3/9/2022 - Present        Abdominal wall hematoma ICD-10-CM: S30. 1XXA  ICD-9-CM: 922.2  4/6/2021 - Present        Back pain ICD-10-CM: M54.9  ICD-9-CM: 724.5  6/23/2018 - Present        RESOLVED: Dizziness ICD-10-CM: R42  ICD-9-CM: 780.4  6/21/2018 - 6/26/2018              Greater than 30 minutes were spent with the patient on counseling and coordination of care    Signed:   [address], DO  3/12/2022  2:33 PM

## 2022-03-12 NOTE — PROGRESS NOTES
Attempted to call report to [facility]. Report given to [facility] Jenifer DUNCAN RN at [facility]. I have reviewed discharge instructions with the Naseem Sanchezle, son. The Naseem Talamantes, the son, verbalized understanding. Patient IV removed. Patient taken via wheelchair to exit with all belongings to sons car.

## 2022-03-12 NOTE — PROGRESS NOTES
Problem: Pressure Injury - Risk of  Goal: *Prevention of pressure injury  Description: Document Franklyn Scale and appropriate interventions in the flowsheet.   Outcome: Progressing Towards Goal  Note: Pressure Injury Interventions:  Sensory Interventions: Assess changes in LOC,Assess need for specialty bed,Avoid rigorous massage over bony prominences    Moisture Interventions: Absorbent underpads,Apply protective barrier, creams and emollients,Assess need for specialty bed    Activity Interventions: Assess need for specialty bed,Chair cushion,Increase time out of bed    Mobility Interventions: Assess need for specialty bed,Chair cushion,Float heels    Nutrition Interventions: Document food/fluid/supplement intake,Discuss nutritional consult with provider    Friction and Shear Interventions: Apply protective barrier, creams and emollients,Feet elevated on foot rest,Foam dressings/transparent film/skin sealants                Problem: Patient Education: Go to Patient Education Activity  Goal: Patient/Family Education  Outcome: Progressing Towards Goal     Problem: Patient Education: Go to Patient Education Activity  Goal: Patient/Family Education  Outcome: Progressing Towards Goal     Problem: Patient Education: Go to Patient Education Activity  Goal: Patient/Family Education  Outcome: Progressing Towards Goal     Problem: TIA/CVA Stroke: 0-24 hours  Goal: Off Pathway (Use only if patient is Off Pathway)  Outcome: Progressing Towards Goal  Goal: Activity/Safety  Outcome: Progressing Towards Goal  Goal: Consults, if ordered  Outcome: Progressing Towards Goal  Goal: Diagnostic Test/Procedures  Outcome: Progressing Towards Goal  Goal: Nutrition/Diet  Outcome: Progressing Towards Goal  Goal: Discharge Planning  Outcome: Progressing Towards Goal  Goal: Medications  Outcome: Progressing Towards Goal  Goal: Respiratory  Outcome: Progressing Towards Goal  Goal: Treatments/Interventions/Procedures  Outcome: Progressing Towards Goal  Goal: Minimize risk of bleeding post-thrombolytic infusion  Outcome: Progressing Towards Goal  Goal: Monitor for complications post-thrombolytic infusion  Outcome: Progressing Towards Goal  Goal: Psychosocial  Outcome: Progressing Towards Goal  Goal: *Hemodynamically stable  Outcome: Progressing Towards Goal  Goal: *Neurologically stable  Description: Absence of additional neurological deficits    Outcome: Progressing Towards Goal  Goal: *Verbalizes anxiety and depression are reduced or absent  Outcome: Progressing Towards Goal  Goal: *Absence of Signs of Aspiration on Current Diet  Outcome: Progressing Towards Goal  Goal: *Absence of deep venous thrombosis signs and symptoms(Stroke Metric)  Outcome: Progressing Towards Goal  Goal: *Ability to perform ADLs and demonstrates progressive mobility and function  Outcome: Progressing Towards Goal  Goal: *Stroke education started(Stroke Metric)  Outcome: Progressing Towards Goal  Goal: *Dysphagia screen performed(Stroke Metric)  Outcome: Progressing Towards Goal  Goal: *Rehab consulted(Stroke Metric)  Outcome: Progressing Towards Goal     Problem: TIA/CVA Stroke: Day 2 Until Discharge  Goal: Off Pathway (Use only if patient is Off Pathway)  Outcome: Progressing Towards Goal  Goal: Activity/Safety  Outcome: Progressing Towards Goal  Goal: Diagnostic Test/Procedures  Outcome: Progressing Towards Goal  Goal: Nutrition/Diet  Outcome: Progressing Towards Goal  Goal: Discharge Planning  Outcome: Progressing Towards Goal  Goal: Medications  Outcome: Progressing Towards Goal  Goal: Respiratory  Outcome: Progressing Towards Goal  Goal: Treatments/Interventions/Procedures  Outcome: Progressing Towards Goal  Goal: Psychosocial  Outcome: Progressing Towards Goal  Goal: *Verbalizes anxiety and depression are reduced or absent  Outcome: Progressing Towards Goal  Goal: *Absence of aspiration  Outcome: Progressing Towards Goal  Goal: *Absence of deep venous thrombosis signs and symptoms(Stroke Metric)  Outcome: Progressing Towards Goal  Goal: *Optimal pain control at patient's stated goal  Outcome: Progressing Towards Goal  Goal: *Tolerating diet  Outcome: Progressing Towards Goal  Goal: *Ability to perform ADLs and demonstrates progressive mobility and function  Outcome: Progressing Towards Goal  Goal: *Stroke education continued(Stroke Metric)  Outcome: Progressing Towards Goal     Problem: Ischemic Stroke: Discharge Outcomes  Goal: *Verbalizes anxiety and depression are reduced or absent  Outcome: Progressing Towards Goal  Goal: *Verbalize understanding of risk factor modification(Stroke Metric)  Outcome: Progressing Towards Goal  Goal: *Hemodynamically stable  Outcome: Progressing Towards Goal  Goal: *Absence of aspiration pneumonia  Outcome: Progressing Towards Goal  Goal: *Aware of needed dietary changes  Outcome: Progressing Towards Goal  Goal: *Verbalize understanding of prescribed medications including anti-coagulants, anti-lipid, and/or anti-platelets(Stroke Metric)  Outcome: Progressing Towards Goal  Goal: *Tolerating diet  Outcome: Progressing Towards Goal  Goal: *Aware of follow-up diagnostics related to anticoagulants  Outcome: Progressing Towards Goal  Goal: *Ability to perform ADLs and demonstrates progressive mobility and function  Outcome: Progressing Towards Goal  Goal: *Absence of DVT(Stroke Metric)  Outcome: Progressing Towards Goal  Goal: *Absence of aspiration  Outcome: Progressing Towards Goal  Goal: *Optimal pain control at patient's stated goal  Outcome: Progressing Towards Goal  Goal: *Home safety concerns addressed  Outcome: Progressing Towards Goal  Goal: *Describes available resources and support systems  Outcome: Progressing Towards Goal  Goal: *Verbalizes understanding of activation of EMS(911) for stroke symptoms(Stroke Metric)  Outcome: Progressing Towards Goal  Goal: *Understands and describes signs and symptoms to report to providers(Stroke Metric)  Outcome: Progressing Towards Goal  Goal: *Neurolgocially stable (absence of additional neurological deficits)  Outcome: Progressing Towards Goal  Goal: *Verbalizes importance of follow-up with primary care physician(Stroke Metric)  Outcome: Progressing Towards Goal  Goal: *Smoking cessation discussed,if applicable(Stroke Metric)  Outcome: Progressing Towards Goal  Goal: *Depression screening completed(Stroke Metric)  Outcome: Progressing Towards Goal     Problem: Pain  Goal: *Control of Pain  Outcome: Progressing Towards Goal  Goal: *PALLIATIVE CARE:  Alleviation of Pain  Outcome: Progressing Towards Goal     Problem: Patient Education: Go to Patient Education Activity  Goal: Patient/Family Education  Outcome: Progressing Towards Goal

## 2022-03-12 NOTE — PROGRESS NOTES
Bedside shift change report given to KENNY Santos (oncoming nurse) by Hansel Echavarria RN (offgoing nurse). Report included the following information SBAR, ED Summary, Intake/Output, Recent Results and Cardiac Rhythm NSR.

## 2022-03-14 ENCOUNTER — PATIENT OUTREACH (OUTPATIENT)
Dept: CASE MANAGEMENT | Age: 87
End: 2022-03-14

## 2022-03-19 PROBLEM — M54.9 BACK PAIN: Status: ACTIVE | Noted: 2018-06-23

## 2022-03-19 PROBLEM — S30.1XXA ABDOMINAL WALL HEMATOMA: Status: ACTIVE | Noted: 2021-04-06

## 2022-03-19 PROBLEM — I63.9 CVA (CEREBRAL VASCULAR ACCIDENT) (HCC): Status: ACTIVE | Noted: 2022-03-09

## 2022-04-19 NOTE — PROGRESS NOTES
Pharmacist Admission Medication Reconciliation:    Updated Ms Alena Wolf PTA medication list with list from Makayla Harley. Medication changes (since last review):  Removed: scheduled Tylenol, Estrace, Preparation H, Lidoderm patches, Neosporin, Miralax. Added: Lasix, aspirin, docusate, Hiprex, melatonin, senna    Thank you for allowing me to participate in this patient's care. Please call [phone] or [phone] with any questions. Julia Liu, Pharmacist          [facility] pharmacy benefit data reflects medications filled and processed through the patient's insurance,   however this data does NOT capture whether the medication was picked up or is currently being taken by the patient. Prior to Admission Medications:   Prior to Admission Medications   Prescriptions Last Dose Informant Taking? Menthol-Zinc Oxide (CALMOSEPTINE) 0.44-20.6 % oint 3/9/2022 at Unknown time  Yes   Sig: Apply  to affected area three (3) times daily. acetaminophen (TYLENOL) 325 mg tablet 2/9/2022 at Unknown time  Yes   Sig: Take 650 mg by mouth every four (4) hours as needed for Pain. aspirin 81 mg chewable tablet 3/8/2022 at 1038  Yes   Sig: Take 81 mg by mouth daily. cholecalciferol (VITAMIN D3) 1,000 unit tablet 3/8/2022 at Unknown time  Yes   Sig: Take 1,000 Units by mouth daily. docusate sodium (COLACE) 100 mg capsule 3/8/2022 at Unknown time  Yes   Sig: Take 100 mg by mouth nightly. furosemide (LASIX) 20 mg tablet 2/9/2022 at Unknown time  Yes   Sig: Take 20 mg by mouth daily. lactulose (ENULOSE) 10 gram/15 mL solution 2/9/2022 at Unknown time  Yes   Sig: Take 10 g by mouth daily as needed. Indications: constipation   melatonin 5 mg tablet 3/8/2022 at Unknown time  Yes   Sig: Take 5 mg by mouth nightly. methenamine hippurate (HIPREX) 1 gram tablet 3/8/2022 at Unknown time  Yes   Sig: Take 1 g by mouth two (2) times daily (with meals).    polyvinyl alcohol (LIQUIFILM TEARS) 1.4 % ophthalmic solution   Yes Sig: Administer 1 Drop to both eyes every four (4) hours as needed. Indications: Dry Eye   senna (Senna) 8.6 mg tablet 3/8/2022 at Unknown time  Yes   Sig: Take 1 Tablet by mouth daily. sertraline (ZOLOFT) 50 mg tablet 3/8/2022 at Unknown time  Yes   Sig: Take 50 mg by mouth daily.       Facility-Administered Medications: None Price (Do Not Change): 0.00 Detail Level: Simple Instructions: This plan will send the code FBSE to the PM system.  DO NOT or CHANGE the price.

## 2023-05-21 RX ORDER — PSEUDOEPHEDRINE HCL 30 MG
100 TABLET ORAL NIGHTLY
COMMUNITY

## 2023-05-21 RX ORDER — ATORVASTATIN CALCIUM 40 MG/1
40 TABLET, FILM COATED ORAL DAILY
COMMUNITY
Start: 2022-03-12

## 2023-05-21 RX ORDER — POLYVINYL ALCOHOL 14 MG/ML
1 SOLUTION/ DROPS OPHTHALMIC EVERY 4 HOURS PRN
COMMUNITY

## 2023-05-21 RX ORDER — CHOLECALCIFEROL (VITAMIN D3) 125 MCG
5 CAPSULE ORAL NIGHTLY
COMMUNITY

## 2023-05-21 RX ORDER — ASPIRIN 81 MG/1
81 TABLET, CHEWABLE ORAL DAILY
COMMUNITY

## 2023-05-21 RX ORDER — FUROSEMIDE 20 MG/1
20 TABLET ORAL DAILY
COMMUNITY

## 2023-05-21 RX ORDER — SENNA PLUS 8.6 MG/1
1 TABLET ORAL DAILY
COMMUNITY

## 2023-05-21 RX ORDER — METHENAMINE HIPPURATE 1000 MG/1
1 TABLET ORAL 2 TIMES DAILY WITH MEALS
COMMUNITY

## 2023-05-21 RX ORDER — LACTULOSE 10 G/15ML
10 SOLUTION ORAL DAILY PRN
COMMUNITY